# Patient Record
Sex: FEMALE | Race: WHITE | Employment: OTHER | URBAN - METROPOLITAN AREA
[De-identification: names, ages, dates, MRNs, and addresses within clinical notes are randomized per-mention and may not be internally consistent; named-entity substitution may affect disease eponyms.]

---

## 2017-02-02 ENCOUNTER — HOSPITAL ENCOUNTER (EMERGENCY)
Age: 57
Discharge: HOME | End: 2017-02-02
Attending: EMERGENCY MEDICINE
Payer: COMMERCIAL

## 2017-02-02 ENCOUNTER — APPOINTMENT (OUTPATIENT)
Dept: GENERAL RADIOLOGY | Age: 57
End: 2017-02-02
Attending: PHYSICIAN ASSISTANT
Payer: COMMERCIAL

## 2017-02-02 VITALS
HEIGHT: 65 IN | HEART RATE: 91 BPM | DIASTOLIC BLOOD PRESSURE: 54 MMHG | BODY MASS INDEX: 29.99 KG/M2 | TEMPERATURE: 97.9 F | OXYGEN SATURATION: 99 % | SYSTOLIC BLOOD PRESSURE: 114 MMHG | WEIGHT: 180 LBS | RESPIRATION RATE: 18 BRPM

## 2017-02-02 DIAGNOSIS — M51.36 DDD (DEGENERATIVE DISC DISEASE), LUMBAR: Primary | ICD-10-CM

## 2017-02-02 PROCEDURE — 99283 EMERGENCY DEPT VISIT LOW MDM: CPT

## 2017-02-02 PROCEDURE — 72100 X-RAY EXAM L-S SPINE 2/3 VWS: CPT

## 2017-02-02 PROCEDURE — 51798 US URINE CAPACITY MEASURE: CPT

## 2017-02-02 PROCEDURE — 74011250637 HC RX REV CODE- 250/637: Performed by: PHYSICIAN ASSISTANT

## 2017-02-02 RX ORDER — HYDROCODONE BITARTRATE AND ACETAMINOPHEN 5; 325 MG/1; MG/1
1 TABLET ORAL
Qty: 20 TAB | Refills: 0 | Status: SHIPPED | OUTPATIENT
Start: 2017-02-02 | End: 2017-02-05

## 2017-02-02 RX ORDER — METHOCARBAMOL 500 MG/1
500 TABLET, FILM COATED ORAL 4 TIMES DAILY
Qty: 20 TAB | Refills: 0 | Status: SHIPPED | OUTPATIENT
Start: 2017-02-02 | End: 2017-02-05

## 2017-02-02 RX ORDER — OXYCODONE AND ACETAMINOPHEN 5; 325 MG/1; MG/1
1 TABLET ORAL
Status: COMPLETED | OUTPATIENT
Start: 2017-02-02 | End: 2017-02-02

## 2017-02-02 RX ORDER — PREDNISONE 10 MG/1
TABLET ORAL
Qty: 21 TAB | Refills: 0 | Status: SHIPPED | OUTPATIENT
Start: 2017-02-02 | End: 2018-10-09

## 2017-02-02 RX ADMIN — OXYCODONE HYDROCHLORIDE AND ACETAMINOPHEN 1 TABLET: 5; 325 TABLET ORAL at 13:23

## 2017-02-02 NOTE — ED PROVIDER NOTES
HPI Comments: This  59 yo  female with medical history remarkable for asthma, mixed connective tissue disease, basal cell cancer, chronic back pain, ETOH abuse, GERD, hypertension, hypertriglyceridemia, and lupus presenting ambulatory to the ED with wife with CC of bilateral lower extremity pain rt>ltand lower back pain. Hx of chronic back pain followed by orthopedist.  Worse now with pain limiting mobility. Several falls in past two weeks. A few episodes of incontinence of urine and stool wearing a undergarment in past week. Last episode two days ago. Has appt with ortho but pain is more intense and not responding to sulindac and flexeril. Has had diarrhea. No fever, chills, headache, chest pain, SOB, abdominal pain, dysuria, urgency or hematuria. Numbness to lateral thigh. Patient is a 60 y.o. female presenting with leg pain. The history is provided by the patient. Leg Pain    Associated symptoms include back pain. Pertinent negatives include no numbness and no neck pain.         Past Medical History:   Diagnosis Date    Asthma      activity induced asthma     Autoimmune disease ([address]      mixed connective tissue disease    Cancer ([address]      skin-basal    Chronic pain      lower back, joints    DDD (degenerative disc disease), lumbar     DJD (degenerative joint disease)     ETOH abuse      Sober 12-13-11    GERD (gastroesophageal reflux disease)     Hernia     Hx of bronchitis     Hx of mammogram 2/18/15     Found a 1.2cm mass = cyst, benign    Hx of seizure disorder 2010     unknown cause, none since    Hx of sinusitis     Hyperlipemia     Hypertension     Hypertriglyceridemia     Hyponatremia     Insomnia     Lupus ([address]     Murmur, cardiac     Neuropathic pain     Other bursitis of hip, right hip 09/14/2016     Had a deep hip injection for pain     Routine Papanicolaou smear 09/20/2016     Negative (no hpv)     Stroke ([address] 3/11     PCP states she had a TIA - patient denies this (was low Na)    Tobacco abuse      Stopped in 2012       Past Surgical History:   Procedure Laterality Date    Hx tonsil and adenoidectomy  1962    Hx lumbar laminectomy       rods in 1996, out 1998    Hx lumbar fusion       10/8/2012    Hx lap cholecystectomy  3/11/2014    Hx hernia repair  10/7/2014     incisional with mesh    Hx other surgical  10/08/2012     [facility] Bone Growth Stimulator    Hx total abdominal hysterectomy  12/20/1992     fibroids; Dr. Shawn Chang Hx cyst incision and drainage Left 08/2016    Hx orthopaedic  1996, 1998     back surgery    Hx orthopaedic Bilateral 2013 R, 2014 L     carpal tunnel    Hx orthopaedic  6/27/2013     right knee arthroscopy    Hx orthopaedic Right 3/2015     trigger thumb release    Hx orthopaedic  10/8/2012     ALIF/ PLIF with bone stimulator    Hx orthopaedic  10/23/2012     Back surgery to adjust hardware         Family History:   Problem Relation Age of Onset    Hypertension Mother     Stroke Mother      TIA    Arthritis-osteo Father      RA    No Known Problems Sister     No Known Problems Brother     No Known Problems Sister     No Known Problems Sister        Social History     Social History    Marital status:      Spouse name: N/A    Number of children: N/A    Years of education: N/A     Occupational History    Not on file. Social History Main Topics    Smoking status: Former Smoker     Packs/day: 2.00     Years: 32.00     Quit date: 7/1/2012    Smokeless tobacco: Never Used      Comment: Never used vapor or e-cigs     Alcohol use No      Comment: Sober 4 years    Drug use: No    Sexual activity: Not Currently     Birth control/ protection: Surgical     Other Topics Concern    Not on file     Social History Narrative         ALLERGIES: Penicillins    Review of Systems   Constitutional: Negative. Negative for chills, fatigue and fever. HENT: Negative.   Negative for congestion, ear pain, facial swelling, rhinorrhea, sneezing and sore throat. Eyes: Negative for pain, discharge and itching. Respiratory: Negative for cough, chest tightness and shortness of breath. Cardiovascular: Negative. Negative for chest pain and leg swelling. Gastrointestinal: Negative. Negative for abdominal distention, abdominal pain, constipation, diarrhea, nausea and vomiting. Genitourinary: Negative for difficulty urinating, frequency and urgency. Musculoskeletal: Positive for back pain. Negative for arthralgias, joint swelling, neck pain and neck stiffness. Skin: Negative for color change and rash. Neurological: Negative for dizziness, tremors, weakness, numbness and headaches. No saddle paresthesias   Psychiatric/Behavioral: Negative for behavioral problems and confusion. All other systems reviewed and are negative. Vitals:    02/02/17 1222 02/02/17 1247   BP: 145/68    Pulse: 91    Resp: 18    Temp: 97.9 °F (36.6 °C)    SpO2: 99% 99%   Weight: 81.6 kg (180 lb)    Height: 5' 5\" (1.651 m)             Physical Exam   Constitutional: She appears well-developed and well-nourished.  adult female seated in NAD   HENT:   Head: Normocephalic and atraumatic. Right Ear: External ear normal.   Left Ear: External ear normal.   Nose: Nose normal.   Mouth/Throat: Oropharynx is clear and moist.   Eyes: Conjunctivae and EOM are normal. Pupils are equal, round, and reactive to light. Neck: Normal range of motion. Neck supple. Cardiovascular: Normal rate, regular rhythm and normal heart sounds. Pulmonary/Chest: Effort normal and breath sounds normal.   Abdominal: Soft. Bowel sounds are normal.   Musculoskeletal: Normal range of motion. Lumbar spine: midline surgical scar, diffuse lower back tenderness, no mass, warmth, or erythema appreciated. No bony tenderness, 5/5 LE strength, distal sensation intact, cap refill appropriate, plantar reflex normal   Neurological: She is alert.    Skin: Skin is warm and dry. Psychiatric: She has a normal mood and affect. Her behavior is normal.   Nursing note and vitals reviewed. MDM  Number of Diagnoses or Management Options  DDD (degenerative disc disease), lumbar:   Diagnosis management comments: 59 yo  female with worsening lower back pain radiating into the legs with increased falls. Reported incontinent hx but inconsistent in occurrence or with physical exam findings. WILL Noriega\    Plan  Xray and analgesia. Wali Noriega         Amount and/or Complexity of Data Reviewed  Tests in the radiology section of CPT®: ordered and reviewed  Independent visualization of images, tracings, or specimens: yes      ED Course       Procedures      Progress note    Imaging reviewed. Wali Noriega    Patient's results have been reviewed with them. Patient and/or family have verbally conveyed their understanding and agreement of the patient's signs, symptoms, diagnosis, treatment and prognosis and additionally agree to follow up as recommended or return to the Emergency Room should their condition change prior to follow-up. Discharge instructions have also been provided to the patient with some educational information regarding their diagnosis as well a list of reasons why they would want to return to the ER prior to their follow-up appointment should their condition change. Wali Noriega    Discussed case with attending Physician Eri Oakley. Agrees with care and will D/C with follow up. Wali Noriega    A/P  DDD: Apply ice. Norco 1-2 tabs every 6 hrs as needed for pain. Stop flexeril and start Robaxin three times daily. Follow-up with ortho. Return for any new or worsening.  Wali Mcmillan

## 2017-02-02 NOTE — ED NOTES
Bladder scan result 197 ml; Reports no urge to urinate at this time however patient placed on bedpan

## 2017-02-02 NOTE — DISCHARGE INSTRUCTIONS
We hope that we have addressed all of your medical concerns. The examination and treatment you received in the Emergency Department were for an emergent problem and were not intended as complete care. It is important that you follow up with your healthcare provider(s) for ongoing care. If your symptoms worsen or do not improve as expected, and you are unable to reach your usual health care provider(s), you should return to the Emergency Department. Today's healthcare is undergoing tremendous change, and patient satisfaction surveys are one of the many tools to assess the quality of medical care. You may receive a survey from the [employer] regarding your experience in the Emergency Department. I hope that your experience has been completely positive, particularly the medical care that I provided. As such, please participate in the survey; anything less than excellent does not meet my expectations or intentions. Formerly [facility] and [employer] participate in nationally recognized quality of care measures. If your blood pressure is greater than 120/80, as reported below, we urge that you seek medical care to address the potential of high blood pressure, commonly known as hypertension. Hypertension can be hereditary or can be caused by certain medical conditions, pain, stress, or \"white coat syndrome. \"       Please make an appointment with your health care provider(s) for follow up of your Emergency Department visit. VITALS:   Patient Vitals for the past 8 hrs:   Temp Pulse Resp BP SpO2   02/02/17 1247 - - - - 99 %   02/02/17 1222 97.9 °F (36.6 °C) 91 18 145/68 99 %          Thank you for allowing us to provide you with medical care today. We realize that you have many choices for your emergency care needs. Please choose us in the future for any continued health care needs. Regards,           Michelle Galaviz, [address] [facility] [place]: [phone]            No results found for this or any previous visit (from the past 24 hour(s)). Xr Spine Lumb 2 Or 3 V    Result Date: 2/2/2017  INDICATION: back pain/leg pain EXAM: Lumbar spine radiographs, 3 views. COMPARISON: 10/18/2012 . FINDINGS: A three-view examination of the lumbar spine reveals normal bony alignment. Bone mineral content is normal for age . There is no obvious acute fracture or dislocation. Vertebral body heights are preserved. Disc space height is diminished at L2-3, new since the prior studies, with endplate sclerosis and spondylosis. . Disc space heights show intervertebral disc devices at L3-4, L4-5 and L5-S1 without change. There are also posterior rods with pedicle screws at the levels L3, L4, L5 and S1. Hardware appears intact and stable in position. Lateral fusion mass is noted bilaterally from L3 through S1. A spinal stimulator is incidentally noted in the upper lumbar spine. .  Pedicles and sacroiliac joints are intact, as are visualized sacral foramina. Aortic calcification is heavy without aneurysm formation. IMPRESSION: 1. No acute bony abnormality of the lumbar spine. 2. Degenerative disc disease significant at L2-3, new since the prior study. 3. Stable postoperative changes. Back Pain: Care Instructions  Your Care Instructions    Back pain has many possible causes. It is often related to problems with muscles and ligaments of the back. It may also be related to problems with the nerves, discs, or bones of the back. Moving, lifting, standing, sitting, or sleeping in an awkward way can strain the back. Sometimes you don't notice the injury until later. Arthritis is another common cause of back pain. Although it may hurt a lot, back pain usually improves on its own within several weeks. Most people recover in 12 weeks or less.  Using good home treatment and being careful not to stress your back can help you feel better sooner. Follow-up care is a key part of your treatment and safety. Be sure to make and go to all appointments, and call your doctor if you are having problems. Its also a good idea to know your test results and keep a list of the medicines you take. How can you care for yourself at home? · Sit or lie in positions that are most comfortable and reduce your pain. Try one of these positions when you lie down:  ¨ Lie on your back with your knees bent and supported by large pillows. ¨ Lie on the floor with your legs on the seat of a sofa or chair. Tim العراقي on your side with your knees and hips bent and a pillow between your legs. ¨ Lie on your stomach if it does not make pain worse. · Do not sit up in bed, and avoid soft couches and twisted positions. Bed rest can help relieve pain at first, but it delays healing. Avoid bed rest after the first day of back pain. · Change positions every 30 minutes. If you must sit for long periods of time, take breaks from sitting. Get up and walk around, or lie in a comfortable position. · Try using a heating pad on a low or medium setting for 15 to 20 minutes every 2 or 3 hours. Try a warm shower in place of one session with the heating pad. · You can also try an ice pack for 10 to 15 minutes every 2 to 3 hours. Put a thin cloth between the ice pack and your skin. · Take pain medicines exactly as directed. ¨ If the doctor gave you a prescription medicine for pain, take it as prescribed. ¨ If you are not taking a prescription pain medicine, ask your doctor if you can take an over-the-counter medicine. · Take short walks several times a day. You can start with 5 to 10 minutes, 3 or 4 times a day, and work up to longer walks. Walk on level surfaces and avoid hills and stairs until your back is better. · Return to work and other activities as soon as you can. Continued rest without activity is usually not good for your back.   · To prevent future back pain, do exercises to stretch and strengthen your back and stomach. Learn how to use good posture, safe lifting techniques, and proper body mechanics. When should you call for help? Call your doctor now or seek immediate medical care if:  · You have new or worsening numbness in your legs. · You have new or worsening weakness in your legs. (This could make it hard to stand up.)  · You lose control of your bladder or bowels. Watch closely for changes in your health, and be sure to contact your doctor if:  · Your pain gets worse. · You are not getting better after 2 weeks. Where can you learn more? Go to http://flory-tyra.info/. Enter K489 in the search box to learn more about \"Back Pain: Care Instructions. \"  Current as of: May 23, 2016  Content Version: 11.1  © 9636-2518 [employer]. Care instructions adapted under license by "[employer]" (which disclaims liability or warranty for this information). If you have questions about a medical condition or this instruction, always ask your healthcare professional. [address] any warranty or liability for your use of this information.

## 2017-02-02 NOTE — ED TRIAGE NOTES
Leg pain, right more so than left, worsening over the last month. Has also had an increase in falls with the last being January 19th. Currently using a cane.

## 2017-02-02 NOTE — Clinical Note
pply ice. Norco 1-2 tabs every 6 hrs as needed for pain. Stop flexeril and start Robaxin three times daily. Follow-up with ortho. Return for any new or worsening.  Michelle NINA [facility]

## 2017-02-05 ENCOUNTER — HOSPITAL ENCOUNTER (EMERGENCY)
Age: 57
Discharge: HOME | End: 2017-02-05
Attending: EMERGENCY MEDICINE
Payer: COMMERCIAL

## 2017-02-05 VITALS
SYSTOLIC BLOOD PRESSURE: 146 MMHG | HEART RATE: 65 BPM | RESPIRATION RATE: 20 BRPM | DIASTOLIC BLOOD PRESSURE: 65 MMHG | HEIGHT: 65 IN | TEMPERATURE: 98.4 F | OXYGEN SATURATION: 97 % | BODY MASS INDEX: 29.99 KG/M2 | WEIGHT: 180 LBS

## 2017-02-05 DIAGNOSIS — M54.42 CHRONIC MIDLINE LOW BACK PAIN WITH BILATERAL SCIATICA: Primary | ICD-10-CM

## 2017-02-05 DIAGNOSIS — G89.29 CHRONIC MIDLINE LOW BACK PAIN WITH BILATERAL SCIATICA: Primary | ICD-10-CM

## 2017-02-05 DIAGNOSIS — M54.41 CHRONIC MIDLINE LOW BACK PAIN WITH BILATERAL SCIATICA: Primary | ICD-10-CM

## 2017-02-05 PROCEDURE — 74011250637 HC RX REV CODE- 250/637: Performed by: PHYSICIAN ASSISTANT

## 2017-02-05 PROCEDURE — 96372 THER/PROPH/DIAG INJ SC/IM: CPT

## 2017-02-05 PROCEDURE — 74011250636 HC RX REV CODE- 250/636: Performed by: PHYSICIAN ASSISTANT

## 2017-02-05 PROCEDURE — 74011250637 HC RX REV CODE- 250/637: Performed by: EMERGENCY MEDICINE

## 2017-02-05 PROCEDURE — 99285 EMERGENCY DEPT VISIT HI MDM: CPT

## 2017-02-05 RX ORDER — DIAZEPAM 5 MG/1
5 TABLET ORAL
Status: COMPLETED | OUTPATIENT
Start: 2017-02-05 | End: 2017-02-05

## 2017-02-05 RX ORDER — LIDOCAINE 50 MG/G
1 PATCH TOPICAL EVERY 24 HOURS
Status: DISCONTINUED | OUTPATIENT
Start: 2017-02-05 | End: 2017-02-05 | Stop reason: HOSPADM

## 2017-02-05 RX ORDER — HYDROMORPHONE HYDROCHLORIDE 2 MG/1
2 TABLET ORAL
Status: COMPLETED | OUTPATIENT
Start: 2017-02-05 | End: 2017-02-05

## 2017-02-05 RX ORDER — LIDOCAINE 50 MG/G
PATCH TOPICAL
Qty: 15 EACH | Refills: 0 | Status: SHIPPED | OUTPATIENT
Start: 2017-02-05 | End: 2018-10-09

## 2017-02-05 RX ORDER — OXYCODONE AND ACETAMINOPHEN 5; 325 MG/1; MG/1
1 TABLET ORAL
Status: COMPLETED | OUTPATIENT
Start: 2017-02-05 | End: 2017-02-05

## 2017-02-05 RX ORDER — DIAZEPAM 5 MG/1
5 TABLET ORAL
Qty: 12 TAB | Refills: 0 | Status: ON HOLD | OUTPATIENT
Start: 2017-02-05 | End: 2017-02-09

## 2017-02-05 RX ORDER — KETOROLAC TROMETHAMINE 30 MG/ML
60 INJECTION, SOLUTION INTRAMUSCULAR; INTRAVENOUS
Status: COMPLETED | OUTPATIENT
Start: 2017-02-05 | End: 2017-02-05

## 2017-02-05 RX ORDER — OXYCODONE AND ACETAMINOPHEN 5; 325 MG/1; MG/1
1 TABLET ORAL
Qty: 12 TAB | Refills: 0 | Status: ON HOLD | OUTPATIENT
Start: 2017-02-05 | End: 2017-02-09

## 2017-02-05 RX ADMIN — OXYCODONE HYDROCHLORIDE AND ACETAMINOPHEN 1 TABLET: 5; 325 TABLET ORAL at 11:22

## 2017-02-05 RX ADMIN — DIAZEPAM 5 MG: 5 TABLET ORAL at 08:59

## 2017-02-05 RX ADMIN — HYDROMORPHONE HYDROCHLORIDE 2 MG: 2 TABLET ORAL at 12:29

## 2017-02-05 RX ADMIN — OXYCODONE HYDROCHLORIDE AND ACETAMINOPHEN 1 TABLET: 5; 325 TABLET ORAL at 08:59

## 2017-02-05 RX ADMIN — DIAZEPAM 5 MG: 5 TABLET ORAL at 11:22

## 2017-02-05 RX ADMIN — KETOROLAC TROMETHAMINE 60 MG: 30 INJECTION, SOLUTION INTRAMUSCULAR at 11:22

## 2017-02-05 NOTE — ED NOTES
[facility] called for ambulance transport home per patient request. Dr. Lopez Torrez has gone back in to speak with patient. A referral to case management initiated for follow up. Going with with RX Percocet and Valium and already has ortho appointment set up with Dr. Eliz Graham for follow up Friday.

## 2017-02-05 NOTE — ED NOTES
Discharge instructions given by provider. [facility] has arrived and updated on ER visit including administered medications. Patient / friend deny any questions regarding discharge instructions.

## 2017-02-05 NOTE — ED PROVIDER NOTES
HPI Comments: Letha Arevalo is a 60 y.o. female with hx significant for chronic back pain who presents ambulatory to ER with c/o low back pain x early January. Pt notes progressively worsening midline lower back pain x early January. States pain has become so severe \"I can't even get out of bed in the mornings\". States \"it takes me hours to get up bc of the pain\". States unable to lie on her back or even sit up bc pain hurts so much. Notes pain radiates down bilateral posterior legs. Notes she has had difficulty getting up and going to the bathroom bc pain is so severe thus has urinated on herself multiple times. Denies any loss of actual control of her bowel/bladder, notes she is simply unable to get out of bed. States she was seen in ER three days ago for the same sx and had xray done at that time. Told DDD in lumbar spine and rx robaxin, norco, and prednisone taper. States taking all at home without relief in sx and states pain is in fact worsening. Notes she has appt with Dr. Sharyle Cal, ortho spine, this coming Friday but couldn't take the pain anymore. Did not call Dr. Kiki Morales office after ER visit 3 days ago. No injury/trauma. No weakness, numbness, tingling, and any other complaints. She specifically denies any fevers, chills, nausea, vomiting, chest pain, shortness of breath, headache, rash, diarrhea, abdominal pain, urinary/bowel changes, sweating or weight loss. PCP: Huong Ren.  Emmie Reyes MD   PMHx significant for: Past Medical History:    Asthma                                                          Comment:activity induced asthma     Autoimmune disease ([address]                                        Comment:mixed connective tissue disease    Cancer ([facility] [address] [address]                                                    Comment:skin-basal    Chronic pain                                                    Comment:lower back, joints    DDD (degenerative disc disease), lumbar                       DJD (degenerative joint disease) ETOH abuse                                                      Comment:Sober 12-13-11    GERD (gastroesophageal reflux disease)                        Hernia                                                        Hx of bronchitis                                              Hx of mammogram                                 2/18/15         Comment:Found a 1.2cm mass = cyst, benign    Hx of seizure disorder                          2010            Comment:unknown cause, none since    Hx of sinusitis                                               Hyperlipemia                                                  Hypertension                                                  Hypertriglyceridemia                                          Hyponatremia                                                  Insomnia                                                      Lupus ([facility]                                                   Murmur, cardiac                                               Neuropathic pain                                              Other bursitis of hip, right hip                09/14/2016      Comment:Had a deep hip injection for pain     Routine Papanicolaou smear                      09/20/2016      Comment:Negative (no hpv)     Stroke ([facility] [address]                                    3/11            Comment:PCP states she had a TIA - patient denies this                (was low Na)    Tobacco abuse                                                   Comment:Stopped in 2012    PSHx significant for: Past Surgical History:    HX TONSIL AND ADENOIDECTOMY                      1962          HX LUMBAR LAMINECTOMY                                            Comment:rods in 1996, out 1998    [facility]    HX LAP CHOLECYSTECTOMY                           3/11/2014     HX HERNIA REPAIR                                 10/7/2014 Comment:incisional with mesh    HX OTHER SURGICAL                                10/08/2012      Comment:[facility] Bone Growth Stimulator    HX TOTAL ABDOMINAL HYSTERECTOMY                  12/20/1992      Comment:fibroids; Dr. Maria D Quiles                   Left 08/2016       HX [address] [facility], 1998      Comment:back surgery    HX ORTHOPAEDIC                                  Bilateral 2013 R, 2*      Comment:carpal tunnel    HX ORTHOPAEDIC                                   6/27/2013       Comment:right knee arthroscopy    HX ORTHOPAEDIC                                  Right 3/2015          Comment:trigger thumb release    HX ORTHOPAEDIC                                   10/8/2012       Comment:ALIF/ PLIF with bone stimulator    HX ORTHOPAEDIC                                   10/23/2012      Comment:Back surgery to adjust hardware      -- Penicillins -- Rash    --  Fever. 9/14/16 Reports able to take Keflex             without problem. There are no other complaints, changes or physical findings at this time. The history is provided by the patient.         Past Medical History:   Diagnosis Date    Asthma      activity induced asthma     Autoimmune disease ([facility] [address]      mixed connective tissue disease    Cancer ([facility] [address]      skin-basal    Chronic pain      lower back, joints    DDD (degenerative disc disease), lumbar     DJD (degenerative joint disease)     ETOH abuse      Sober 12-13-11    GERD (gastroesophageal reflux disease)     Hernia     Hx of bronchitis     Hx of mammogram 2/18/15     Found a 1.2cm mass = cyst, benign    Hx of seizure disorder 2010     unknown cause, none since    Hx of sinusitis     Hyperlipemia     Hypertension     Hypertriglyceridemia     Hyponatremia     Insomnia     Lupus (HCC)     Murmur, cardiac     Neuropathic pain     Other bursitis of hip, right hip 09/14/2016     Had a deep hip injection for pain  Routine Papanicolaou smear 09/20/2016     Negative (no hpv)     Stroke ([facility] [address] 3/11     PCP states she had a TIA - patient denies this (was low Na)    Tobacco abuse      Stopped in 2012       Past Surgical History:   Procedure Laterality Date    Hx tonsil and adenoidectomy  1962    Hx lumbar laminectomy       rods in 1996, out 1998    Hx lumbar fusion       10/8/2012    Hx lap cholecystectomy  3/11/2014    Hx hernia repair  10/7/2014     incisional with mesh    Hx other surgical  10/08/2012     [facility] Bone Growth Stimulator    Hx total abdominal hysterectomy  12/20/1992     fibroids; Dr. Carlos Rodriguez Hx cyst incision and drainage Left 08/2016    Hx orthopaedic  1996, 1998     back surgery    Hx orthopaedic Bilateral 2013 R, 2014 L     carpal tunnel    Hx orthopaedic  6/27/2013     right knee arthroscopy    Hx orthopaedic Right 3/2015     trigger thumb release    Hx orthopaedic  10/8/2012     ALIF/ PLIF with bone stimulator    Hx orthopaedic  10/23/2012     Back surgery to adjust hardware         Family History:   Problem Relation Age of Onset    Hypertension Mother     Stroke Mother      TIA    Arthritis-osteo Father      RA    No Known Problems Sister     No Known Problems Brother     No Known Problems Sister     No Known Problems Sister        Social History     Social History    Marital status:      Spouse name: N/A    Number of children: N/A    Years of education: N/A     Occupational History    Not on file.      Social History Main Topics    Smoking status: Former Smoker     Packs/day: 2.00     Years: 32.00     Quit date: 7/1/2012    Smokeless tobacco: Never Used      Comment: Never used vapor or e-cigs     Alcohol use No      Comment: Sober 4 years    Drug use: No    Sexual activity: Not Currently     Birth control/ protection: Surgical     Other Topics Concern    Not on file     Social History Narrative         ALLERGIES: Penicillins    Review of Systems   Constitutional: Negative. Negative for appetite change, chills, fatigue and fever. HENT: Negative. Negative for congestion and sore throat. Eyes: Negative. Negative for visual disturbance. Respiratory: Negative. Negative for cough and shortness of breath. Cardiovascular: Negative. Negative for chest pain, palpitations and leg swelling. Gastrointestinal: Negative. Negative for abdominal pain, constipation, diarrhea, nausea and vomiting. Genitourinary: Negative. Negative for dysuria, flank pain and hematuria. Musculoskeletal: Positive for back pain. Negative for neck pain. Skin: Negative. Negative for rash. Neurological: Negative. Negative for dizziness, syncope, weakness, numbness and headaches. Hematological: Negative. Psychiatric/Behavioral: Negative. All other systems reviewed and are negative. Vitals:    02/05/17 0833   BP: 154/69   Pulse: 69   Resp: 20   Temp: 98.4 °F (36.9 °C)   SpO2: 100%   Weight: 81.6 kg (180 lb)   Height: 5' 5\" (1.651 m)            Physical Exam   Constitutional: She is oriented to person, place, and time. She appears well-developed and well-nourished. No distress. HENT:   Head: Normocephalic and atraumatic. Mouth/Throat: Oropharynx is clear and moist.   Neck: Normal range of motion. Cardiovascular: Normal rate, S1 normal, S2 normal, normal heart sounds, intact distal pulses and normal pulses. Exam reveals no gallop and no friction rub. No murmur heard. Pulses:       Dorsalis pedis pulses are 2+ on the right side, and 2+ on the left side. Pulmonary/Chest: Effort normal and breath sounds normal. No accessory muscle usage. No respiratory distress. She has no decreased breath sounds. She has no wheezes. She has no rhonchi. She has no rales. Abdominal: Soft. Normal appearance and bowel sounds are normal. She exhibits no distension. There is no hepatosplenomegaly. There is no tenderness. There is no rebound, no guarding and no CVA tenderness. Musculoskeletal: Normal range of motion. She exhibits no edema or tenderness. Mild midline lumbar spinal tenderness, no crepitus or other abnormality. No midline cervical, thoracic spinal tenderness to palpation. FROM spine and all joints/extremities in ER without difficulty. Ambulatory in ER without difficulty. Neurological: She is alert and oriented to person, place, and time. She has normal strength. No sensory deficit. No focal neurologic deficit. Strength 5/5 and equal bilateral upper and lower extremities. NVI all extremities, brisk cap refill all extremities. DP pulse 2+ bilaterally. Radial pulse 2+ bilaterally. Skin: Skin is warm and dry. No rash noted. She is not diaphoretic. No erythema. No pallor. Psychiatric: She has a normal mood and affect. Her behavior is normal.   Nursing note and vitals reviewed. MDM  Number of Diagnoses or Management Options  Diagnosis management comments: DDx: chronic back pain, UTI, drug seeking behavior, DDD       Amount and/or Complexity of Data Reviewed  Clinical lab tests: ordered and reviewed  Decide to obtain previous medical records or to obtain history from someone other than the patient: yes  Review and summarize past medical records: yes  Discuss the patient with other providers: yes (Dr. Laura Long)    Patient Progress  Patient progress: stable    ED Course       Procedures           10:15 AM   Christine Parikh PA-C discussed patient with Miguel Angel Gallegos MD who is in agreement with care plan as outlined. Will be in to see the patient. No further recommendations. Christine Parikh PA-C        10:16 AM  Christine Parikh PA-C personally reviewed patient's prescription history on the [facility]  registry. Most recent rx, filled within the past month, are as follows: hydrocodone qty20 2/2/17. Christine Parikh PA-C        10:40AM  Miguel Angel Gallegos MD in to see patient. Recommends 60mg toradol and discharge home with ortho spine follow up.  Christine Parikh [place]     10:57 AM  Pt has been reevaluated. There are no new complaints, changes, or physical findings at this time. Medications have been reviewed w/ pt and/or family. Pt and/or family's questions have been answered. Pt and/or family expressed good understanding of the dx/tx/rx and is in agreement with plan of care. Pt instructed and agreed to f/u w/ PCP, ortho spine and to return to ED upon further deterioration. Pt is ready for discharge. LABORATORY TESTS:  No results found for this or any previous visit (from the past 12 hour(s)). IMAGING RESULTS:  No orders to display     No results found. MEDICATIONS GIVEN:  Medications   lidocaine (LIDODERM) 5 % patch 1 Patch (1 Patch TransDERmal Apply Patch 2/5/17 0933)   ketorolac (TORADOL) injection 60 mg (not administered)   oxyCODONE-acetaminophen (PERCOCET) 5-325 mg per tablet 1 Tab (1 Tab Oral Given 2/5/17 0859)   diazePAM (VALIUM) tablet 5 mg (5 mg Oral Given 2/5/17 0859)       IMPRESSION:  1. Chronic midline low back pain with bilateral sciatica        PLAN:  1. Current Discharge Medication List      START taking these medications    Details   diazePAM (VALIUM) 5 mg tablet Take 1 Tab by mouth three (3) times daily as needed (spasm). Max Daily Amount: 15 mg.  Qty: 12 Tab, Refills: 0      oxyCODONE-acetaminophen (PERCOCET) 5-325 mg per tablet Take 1 Tab by mouth every six (6) hours as needed for Pain. Max Daily Amount: 4 Tabs. Qty: 12 Tab, Refills: 0      lidocaine (LIDODERM) 5 % Apply patch to the affected area for 12 hours a day as needed for pain and remove for 12 hours a day. Qty: 15 Each, Refills: 0         CONTINUE these medications which have NOT CHANGED    Details   predniSONE (STERAPRED DS) 10 mg dose pack Take according to dose pack instructions. Qty: 21 Tab, Refills: 0      Estradiol (DIVIGEL) 0.5 mg (0.1 %) glpk 1 Packet by TransDERmal route daily.  Apply 1 packet to thigh daily  Qty: 90 Packet, Refills: 3      amLODIPine (NORVASC) 5 mg tablet Take 5 mg by mouth two (2) times a day. Associated Diagnoses: Breast pain, left      esomeprazole (NEXIUM) 40 mg capsule Take 40 mg by mouth two (2) times a day. Associated Diagnoses: Breast pain, left      ADVAIR DISKUS 250-50 mcg/dose diskus inhaler     Associated Diagnoses: Breast pain, left      folic acid (FOLVITE) 1 mg tablet Take 2 mg by mouth daily. Associated Diagnoses: Breast pain, left      gemfibrozil (LOPID) 600 mg tablet Take 600 mg by mouth three (3) times daily. Associated Diagnoses: Breast pain, left      hydroxychloroquine (PLAQUENIL) 200 mg tablet Take 200 mg by mouth two (2) times a day. Associated Diagnoses: Breast pain, left      SAVELLA 50 mg tablet 50 mg two (2) times a day. Associated Diagnoses: Breast pain, left      BYSTOLIC 10 mg tablet Take 10 mg by mouth daily. Associated Diagnoses: Breast pain, left      sucralfate (CARAFATE) 1 gram tablet     Associated Diagnoses: Breast pain, left      methotrexate (RHEUMATREX) 2.5 mg tablet Take 6 Tabs by mouth Every Friday. MAY RESUME 2 WEEKS AFTER SURGERY  Qty: 1 Tab, Refills: 0      clindamycin (CLEOCIN) 300 mg capsule Take 600 mg by mouth daily as needed (takes 1 hour prior to dental surgery). OTHER       lisinopril (PRINIVIL, ZESTRIL) 10 mg tablet Take 20 mg by mouth daily. pyridoxine (VITAMIN B-6) 100 mg tablet Take 100 mg by mouth daily. cyanocobalamin (VITAMIN B-12) 1,000 mcg tablet Take 1,000 mcg by mouth daily. albuterol (PROVENTIL HFA) 90 mcg/actuation inhaler Take 2 Puffs by inhalation every six (6) hours as needed. CALCIUM PO Take 600 mg by mouth three (3) times daily. CYCLOSPORINE (RESTASIS OP) Apply 1 drop to eye two (2) times a day. One drop both eyes 2 x daily      multivitamin (ONE A DAY) tablet Take 1 Tab by mouth daily.          STOP taking these medications       HYDROcodone-acetaminophen (NORCO) 5-325 mg per tablet Comments:   Reason for Stopping: methocarbamol (ROBAXIN) 500 mg tablet Comments:   Reason for Stoppin.   Follow-up Information     Follow up With Details Comments [address], [place] Schedule an appointment as soon as possible for a visit in 3 days  [address]      Elmira Carty MD Go to as scheduled this week [address] [place] [address]      [facility] EMERGENCY DEPT  If symptoms worsen [address] [facility]  [phone]            Return to ED if worse         12:10PM  Pt refusing to leave ER until \"I am pain free\". Notified Leeanna Roque MD who is in to see patient. Will give one dose or oral dilaudid and discharge home.  Kvng Mendoza PA-C

## 2017-02-05 NOTE — ED NOTES
Bladder scan reveals 875 ml of urine in bladder. Has been wearing incontinence pull ups because she has not wanted to ambulate to bathroom due to pain. Offered BSC and refused to attempt. Explained incontinence wick and agreed to try. MT at bedside and wick positioned as per recommendations and connected to suction. Pillow provided for under head and between knees, now side lying to self position of comfort.

## 2017-02-05 NOTE — DISCHARGE INSTRUCTIONS
We hope that we have addressed all of your medical concerns. The examination and treatment you received in the Emergency Department were for an emergent problem and were not intended as complete care. It is important that you follow up with your healthcare provider(s) for ongoing care. If your symptoms worsen or do not improve as expected, and you are unable to reach your usual health care provider(s), you should return to the Emergency Department. Today's healthcare is undergoing tremendous change, and patient satisfaction surveys are one of the many tools to assess the quality of medical care. You may receive a survey from the [employer] regarding your experience in the Emergency Department. I hope that your experience has been completely positive, particularly the medical care that I provided. As such, please participate in the survey; anything less than excellent does not meet my expectations or intentions. [facility] and [address] [place] [address] participate in nationally recognized quality of care measures. If your blood pressure is greater than 120/80, as reported below, we urge that you seek medical care to address the potential of high blood pressure, commonly known as hypertension. Hypertension can be hereditary or can be caused by certain medical conditions, pain, stress, or \"white coat syndrome. \"       Please make an appointment with your health care provider(s) for follow up of your Emergency Department visit. VITALS:   Patient Vitals for the past 8 hrs:   Temp Pulse Resp BP SpO2   02/05/17 0833 98.4 °F (36.9 °C) 69 20 154/69 100 %          Thank you for allowing us to provide you with medical care today. We realize that you have many choices for your emergency care needs. Please choose us in the future for any continued health care needs. Eliz Bush, [address] [place] [address] [address] Office: [phone]            No results found for this or any previous visit (from the past 24 hour(s)). No results found. Back Pain: Care Instructions  Your Care Instructions    Back pain has many possible causes. It is often related to problems with muscles and ligaments of the back. It may also be related to problems with the nerves, discs, or bones of the back. Moving, lifting, standing, sitting, or sleeping in an awkward way can strain the back. Sometimes you don't notice the injury until later. Arthritis is another common cause of back pain. Although it may hurt a lot, back pain usually improves on its own within several weeks. Most people recover in 12 weeks or less. Using good home treatment and being careful not to stress your back can help you feel better sooner. Follow-up care is a key part of your treatment and safety. Be sure to make and go to all appointments, and call your doctor if you are having problems. Its also a good idea to know your test results and keep a list of the medicines you take. How can you care for yourself at home? · Sit or lie in positions that are most comfortable and reduce your pain. Try one of these positions when you lie down:  ¨ Lie on your back with your knees bent and supported by large pillows. ¨ Lie on the floor with your legs on the seat of a sofa or chair. Dallie Tamar on your side with your knees and hips bent and a pillow between your legs. ¨ Lie on your stomach if it does not make pain worse. · Do not sit up in bed, and avoid soft couches and twisted positions. Bed rest can help relieve pain at first, but it delays healing. Avoid bed rest after the first day of back pain. · Change positions every 30 minutes. If you must sit for long periods of time, take breaks from sitting. Get up and walk around, or lie in a comfortable position. · Try using a heating pad on a low or medium setting for 15 to 20 minutes every 2 or 3 hours.  Try a warm shower in place of one session with the heating pad. · You can also try an ice pack for 10 to 15 minutes every 2 to 3 hours. Put a thin cloth between the ice pack and your skin. · Take pain medicines exactly as directed. ¨ If the doctor gave you a prescription medicine for pain, take it as prescribed. ¨ If you are not taking a prescription pain medicine, ask your doctor if you can take an over-the-counter medicine. · Take short walks several times a day. You can start with 5 to 10 minutes, 3 or 4 times a day, and work up to longer walks. Walk on level surfaces and avoid hills and stairs until your back is better. · Return to work and other activities as soon as you can. Continued rest without activity is usually not good for your back. · To prevent future back pain, do exercises to stretch and strengthen your back and stomach. Learn how to use good posture, safe lifting techniques, and proper body mechanics. When should you call for help? Call your doctor now or seek immediate medical care if:  · You have new or worsening numbness in your legs. · You have new or worsening weakness in your legs. (This could make it hard to stand up.)  · You lose control of your bladder or bowels. Watch closely for changes in your health, and be sure to contact your doctor if:  · Your pain gets worse. · You are not getting better after 2 weeks. Where can you learn more? Go to http://flory-tyra.info/. Enter E615 in the search box to learn more about \"Back Pain: Care Instructions. \"  Current as of: May 23, 2016  Content Version: 11.1  © 5573-6789 [employer]. Care instructions adapted under license by [employer] (which disclaims liability or warranty for this information). If you have questions about a medical condition or this instruction, always ask your healthcare professional. [address] any warranty or liability for your use of this information. Acute Low Back Pain: Exercises  Your Care Instructions  Here are some examples of typical rehabilitation exercises for your condition. Start each exercise slowly. Ease off the exercise if you start to have pain. Your doctor or physical therapist will tell you when you can start these exercises and which ones will work best for you. When you are not being active, find a comfortable position for rest. Some people are comfortable on the floor or a medium-firm bed with a small pillow under their head and another under their knees. Some people prefer to lie on their side with a pillow between their knees. Don't stay in one position for too long. Take short walks (10 to 20 minutes) every 2 to 3 hours. Avoid slopes, hills, and stairs until you feel better. Walk only distances you can manage without pain, especially leg pain. How to do the exercises  Back stretches    1. Get down on your hands and knees on the floor. 2. Relax your head and allow it to droop. Round your back up toward the ceiling until you feel a nice stretch in your upper, middle, and lower back. Hold this stretch for as long as it feels comfortable, or about 15 to 30 seconds. 3. Return to the starting position with a flat back while you are on your hands and knees. 4. Let your back sway by pressing your stomach toward the floor. Lift your buttocks toward the ceiling. 5. Hold this position for 15 to 30 seconds. 6. Repeat 2 to 4 times. Follow-up care is a key part of your treatment and safety. Be sure to make and go to all appointments, and call your doctor if you are having problems. It's also a good idea to know your test results and keep a list of the medicines you take. Where can you learn more? Go to http://flory-tyra.info/. Enter C553 in the search box to learn more about \"Acute Low Back Pain: Exercises. \"  Current as of: May 23, 2016  Content Version: 11.1  © 9355-3628 [employer].  Care instructions adapted under license by [employer] (which disclaims liability or warranty for this information). If you have questions about a medical condition or this instruction, always ask your healthcare professional. [address] any warranty or liability for your use of this information.

## 2017-02-05 NOTE — ED TRIAGE NOTES
Pt returns here with chronic lower back pain with radiation into each leg. Pt also reports intermittent incontinence for the last 2 weeks.

## 2017-02-05 NOTE — ED NOTES
Attempted to d/c pt and pt started screaming and stated she was not going anywhere. Primary RN, Anders Garcia, aware and at bedside-will notify provider.

## 2017-02-05 NOTE — ED NOTES
Attempted to sit patient up for discharge, remained in prone position attempted to bend her right knee up, screamed out \"I\"m not moving or  going anywhere until this pain is controlled\" Dr. Amy Malcolm notified.

## 2017-02-08 ENCOUNTER — APPOINTMENT (OUTPATIENT)
Dept: MRI IMAGING | Age: 57
End: 2017-02-08
Attending: PHYSICIAN ASSISTANT
Payer: COMMERCIAL

## 2017-02-08 ENCOUNTER — APPOINTMENT (OUTPATIENT)
Dept: GENERAL RADIOLOGY | Age: 57
End: 2017-02-08
Attending: PHYSICIAN ASSISTANT
Payer: COMMERCIAL

## 2017-02-08 ENCOUNTER — HOSPITAL ENCOUNTER (OUTPATIENT)
Age: 57
Setting detail: OBSERVATION
Discharge: HOME | End: 2017-02-09
Attending: EMERGENCY MEDICINE | Admitting: ORTHOPAEDIC SURGERY
Payer: COMMERCIAL

## 2017-02-08 ENCOUNTER — APPOINTMENT (OUTPATIENT)
Dept: CT IMAGING | Age: 57
End: 2017-02-08
Attending: PHYSICIAN ASSISTANT
Payer: COMMERCIAL

## 2017-02-08 DIAGNOSIS — M54.50 CHRONIC BILATERAL LOW BACK PAIN WITHOUT SCIATICA: Primary | ICD-10-CM

## 2017-02-08 DIAGNOSIS — G89.29 CHRONIC BILATERAL LOW BACK PAIN WITHOUT SCIATICA: Primary | ICD-10-CM

## 2017-02-08 LAB
ANION GAP BLD CALC-SCNC: 11 MMOL/L (ref 5–15)
APPEARANCE UR: CLEAR
APTT PPP: 27.1 SEC (ref 22.1–32.5)
BACTERIA URNS QL MICRO: NEGATIVE /HPF
BASOPHILS # BLD AUTO: 0 K/UL (ref 0–0.1)
BASOPHILS # BLD: 0 % (ref 0–1)
BILIRUB UR QL: NEGATIVE
BUN SERPL-MCNC: 26 MG/DL (ref 6–20)
BUN/CREAT SERPL: 27 (ref 12–20)
CALCIUM SERPL-MCNC: 8.7 MG/DL (ref 8.5–10.1)
CAOX CRY URNS QL MICRO: ABNORMAL
CHLORIDE SERPL-SCNC: 99 MMOL/L (ref 97–108)
CO2 SERPL-SCNC: 23 MMOL/L (ref 21–32)
COLOR UR: ABNORMAL
CREAT SERPL-MCNC: 0.97 MG/DL (ref 0.55–1.02)
EOSINOPHIL # BLD: 0.1 K/UL (ref 0–0.4)
EOSINOPHIL NFR BLD: 1 % (ref 0–7)
EPITH CASTS URNS QL MICRO: ABNORMAL /LPF
ERYTHROCYTE [DISTWIDTH] IN BLOOD BY AUTOMATED COUNT: 13.9 % (ref 11.5–14.5)
GLUCOSE SERPL-MCNC: 103 MG/DL (ref 65–100)
GLUCOSE UR STRIP.AUTO-MCNC: NEGATIVE MG/DL
HCT VFR BLD AUTO: 32.6 % (ref 35–47)
HGB BLD-MCNC: 10.8 G/DL (ref 11.5–16)
HGB UR QL STRIP: NEGATIVE
INR PPP: 1.1 (ref 0.9–1.1)
KETONES UR QL STRIP.AUTO: NEGATIVE MG/DL
LEUKOCYTE ESTERASE UR QL STRIP.AUTO: NEGATIVE
LYMPHOCYTES # BLD AUTO: 12 % (ref 12–49)
LYMPHOCYTES # BLD: 2.3 K/UL (ref 0.8–3.5)
MCH RBC QN AUTO: 32.4 PG (ref 26–34)
MCHC RBC AUTO-ENTMCNC: 33.1 G/DL (ref 30–36.5)
MCV RBC AUTO: 97.9 FL (ref 80–99)
MONOCYTES # BLD: 1.3 K/UL (ref 0–1)
MONOCYTES NFR BLD AUTO: 7 % (ref 5–13)
NEUTS SEG # BLD: 15.8 K/UL (ref 1.8–8)
NEUTS SEG NFR BLD AUTO: 80 % (ref 32–75)
NITRITE UR QL STRIP.AUTO: NEGATIVE
PH UR STRIP: 6 [PH] (ref 5–8)
PLATELET # BLD AUTO: 352 K/UL (ref 150–400)
POTASSIUM SERPL-SCNC: 4.5 MMOL/L (ref 3.5–5.1)
PROT UR STRIP-MCNC: NEGATIVE MG/DL
PROTHROMBIN TIME: 11.6 SEC (ref 9–11.1)
RBC # BLD AUTO: 3.33 M/UL (ref 3.8–5.2)
RBC #/AREA URNS HPF: ABNORMAL /HPF (ref 0–5)
SODIUM SERPL-SCNC: 133 MMOL/L (ref 136–145)
SP GR UR REFRACTOMETRY: 1.02 (ref 1–1.03)
THERAPEUTIC RANGE,PTTT: NORMAL SECS (ref 58–77)
UA: UC IF INDICATED,UAUC: ABNORMAL
UROBILINOGEN UR QL STRIP.AUTO: 0.2 EU/DL (ref 0.2–1)
WBC # BLD AUTO: 19.6 K/UL (ref 3.6–11)
WBC URNS QL MICRO: ABNORMAL /HPF (ref 0–4)

## 2017-02-08 PROCEDURE — 99218 HC RM OBSERVATION: CPT

## 2017-02-08 PROCEDURE — 96374 THER/PROPH/DIAG INJ IV PUSH: CPT

## 2017-02-08 PROCEDURE — 36415 COLL VENOUS BLD VENIPUNCTURE: CPT | Performed by: PHYSICIAN ASSISTANT

## 2017-02-08 PROCEDURE — 51798 US URINE CAPACITY MEASURE: CPT

## 2017-02-08 PROCEDURE — 72100 X-RAY EXAM L-S SPINE 2/3 VWS: CPT

## 2017-02-08 PROCEDURE — 80048 BASIC METABOLIC PNL TOTAL CA: CPT | Performed by: PHYSICIAN ASSISTANT

## 2017-02-08 PROCEDURE — 96361 HYDRATE IV INFUSION ADD-ON: CPT

## 2017-02-08 PROCEDURE — 99284 EMERGENCY DEPT VISIT MOD MDM: CPT

## 2017-02-08 PROCEDURE — 85730 THROMBOPLASTIN TIME PARTIAL: CPT | Performed by: PHYSICIAN ASSISTANT

## 2017-02-08 PROCEDURE — 74011250636 HC RX REV CODE- 250/636: Performed by: PHYSICIAN ASSISTANT

## 2017-02-08 PROCEDURE — 74011250637 HC RX REV CODE- 250/637: Performed by: PHYSICIAN ASSISTANT

## 2017-02-08 PROCEDURE — 81001 URINALYSIS AUTO W/SCOPE: CPT | Performed by: PHYSICIAN ASSISTANT

## 2017-02-08 PROCEDURE — 72128 CT CHEST SPINE W/O DYE: CPT

## 2017-02-08 PROCEDURE — 72125 CT NECK SPINE W/O DYE: CPT

## 2017-02-08 PROCEDURE — 85610 PROTHROMBIN TIME: CPT | Performed by: PHYSICIAN ASSISTANT

## 2017-02-08 PROCEDURE — 72131 CT LUMBAR SPINE W/O DYE: CPT

## 2017-02-08 PROCEDURE — 85025 COMPLETE CBC W/AUTO DIFF WBC: CPT | Performed by: PHYSICIAN ASSISTANT

## 2017-02-08 PROCEDURE — 96375 TX/PRO/DX INJ NEW DRUG ADDON: CPT

## 2017-02-08 RX ORDER — ALBUTEROL SULFATE 90 UG/1
2 AEROSOL, METERED RESPIRATORY (INHALATION)
COMMUNITY

## 2017-02-08 RX ORDER — FLUTICASONE FUROATE AND VILANTEROL 100; 25 UG/1; UG/1
1 POWDER RESPIRATORY (INHALATION) DAILY
Status: DISCONTINUED | OUTPATIENT
Start: 2017-02-09 | End: 2017-02-09 | Stop reason: HOSPADM

## 2017-02-08 RX ORDER — CYCLOSPORINE 0.5 MG/ML
1 EMULSION OPHTHALMIC 2 TIMES DAILY
Status: DISCONTINUED | OUTPATIENT
Start: 2017-02-09 | End: 2017-02-09 | Stop reason: HOSPADM

## 2017-02-08 RX ORDER — DIAZEPAM 5 MG/1
10 TABLET ORAL ONCE
Status: DISCONTINUED | OUTPATIENT
Start: 2017-02-08 | End: 2017-02-08

## 2017-02-08 RX ORDER — NEBIVOLOL 5 MG/1
10 TABLET ORAL DAILY
Status: DISCONTINUED | OUTPATIENT
Start: 2017-02-09 | End: 2017-02-09 | Stop reason: HOSPADM

## 2017-02-08 RX ORDER — SODIUM CHLORIDE TAB 1 GM 1 G
1 TAB MISCELLANEOUS DAILY
Status: DISCONTINUED | OUTPATIENT
Start: 2017-02-09 | End: 2017-02-09 | Stop reason: HOSPADM

## 2017-02-08 RX ORDER — SODIUM CHLORIDE 0.9 % (FLUSH) 0.9 %
5-10 SYRINGE (ML) INJECTION EVERY 8 HOURS
Status: DISCONTINUED | OUTPATIENT
Start: 2017-02-08 | End: 2017-02-09 | Stop reason: HOSPADM

## 2017-02-08 RX ORDER — LANOLIN ALCOHOL/MO/W.PET/CERES
100 CREAM (GRAM) TOPICAL DAILY
Status: DISCONTINUED | OUTPATIENT
Start: 2017-02-09 | End: 2017-02-09 | Stop reason: HOSPADM

## 2017-02-08 RX ORDER — CHOLECALCIFEROL (VITAMIN D3) 125 MCG
2000 CAPSULE ORAL DAILY
COMMUNITY

## 2017-02-08 RX ORDER — PYRIDOXINE HCL (VITAMIN B6) 100 MG
100 TABLET ORAL DAILY
COMMUNITY

## 2017-02-08 RX ORDER — SODIUM CHLORIDE 0.9 % (FLUSH) 0.9 %
5-10 SYRINGE (ML) INJECTION AS NEEDED
Status: DISCONTINUED | OUTPATIENT
Start: 2017-02-08 | End: 2017-02-09 | Stop reason: HOSPADM

## 2017-02-08 RX ORDER — LANOLIN ALCOHOL/MO/W.PET/CERES
1000 CREAM (GRAM) TOPICAL DAILY
Status: DISCONTINUED | OUTPATIENT
Start: 2017-02-09 | End: 2017-02-09 | Stop reason: HOSPADM

## 2017-02-08 RX ORDER — LISINOPRIL 20 MG/1
20 TABLET ORAL DAILY
Status: DISCONTINUED | OUTPATIENT
Start: 2017-02-09 | End: 2017-02-09 | Stop reason: HOSPADM

## 2017-02-08 RX ORDER — HYDROMORPHONE HYDROCHLORIDE 1 MG/ML
1 INJECTION, SOLUTION INTRAMUSCULAR; INTRAVENOUS; SUBCUTANEOUS
Status: DISCONTINUED | OUTPATIENT
Start: 2017-02-08 | End: 2017-02-09 | Stop reason: HOSPADM

## 2017-02-08 RX ORDER — GABAPENTIN 100 MG/1
100 CAPSULE ORAL 2 TIMES DAILY
Status: DISCONTINUED | OUTPATIENT
Start: 2017-02-08 | End: 2017-02-09 | Stop reason: HOSPADM

## 2017-02-08 RX ORDER — DIPHENHYDRAMINE HCL 25 MG
25 CAPSULE ORAL
Status: DISCONTINUED | OUTPATIENT
Start: 2017-02-08 | End: 2017-02-09 | Stop reason: HOSPADM

## 2017-02-08 RX ORDER — DEXAMETHASONE SODIUM PHOSPHATE 4 MG/ML
10 INJECTION, SOLUTION INTRA-ARTICULAR; INTRALESIONAL; INTRAMUSCULAR; INTRAVENOUS; SOFT TISSUE EVERY 8 HOURS
Status: COMPLETED | OUTPATIENT
Start: 2017-02-08 | End: 2017-02-09

## 2017-02-08 RX ORDER — THERA TABS 400 MCG
1 TAB ORAL DAILY
COMMUNITY

## 2017-02-08 RX ORDER — GEMFIBROZIL 600 MG/1
600 TABLET, FILM COATED ORAL
COMMUNITY

## 2017-02-08 RX ORDER — ESOMEPRAZOLE MAGNESIUM 40 MG/1
40 CAPSULE, DELAYED RELEASE ORAL
COMMUNITY
End: 2019-10-15

## 2017-02-08 RX ORDER — SULINDAC 200 MG/1
200 TABLET ORAL 2 TIMES DAILY
COMMUNITY
End: 2018-10-09

## 2017-02-08 RX ORDER — CYCLOBENZAPRINE HCL 10 MG
10 TABLET ORAL
Status: DISCONTINUED | OUTPATIENT
Start: 2017-02-08 | End: 2017-02-08

## 2017-02-08 RX ORDER — ASPIRIN 81 MG/1
81 TABLET ORAL DAILY
COMMUNITY
End: 2018-10-09

## 2017-02-08 RX ORDER — DEXAMETHASONE SODIUM PHOSPHATE 4 MG/ML
10 INJECTION, SOLUTION INTRA-ARTICULAR; INTRALESIONAL; INTRAMUSCULAR; INTRAVENOUS; SOFT TISSUE ONCE
Status: DISCONTINUED | OUTPATIENT
Start: 2017-02-08 | End: 2017-02-08

## 2017-02-08 RX ORDER — AMLODIPINE BESYLATE 5 MG/1
5 TABLET ORAL EVERY EVENING
Status: DISCONTINUED | OUTPATIENT
Start: 2017-02-08 | End: 2017-02-09 | Stop reason: HOSPADM

## 2017-02-08 RX ORDER — NEBIVOLOL 10 MG/1
10 TABLET ORAL DAILY
COMMUNITY
End: 2020-10-16

## 2017-02-08 RX ORDER — ALBUTEROL SULFATE 90 UG/1
2 AEROSOL, METERED RESPIRATORY (INHALATION)
Status: DISCONTINUED | OUTPATIENT
Start: 2017-02-08 | End: 2017-02-09 | Stop reason: HOSPADM

## 2017-02-08 RX ORDER — FOLIC ACID 1 MG/1
2 TABLET ORAL DAILY
Status: DISCONTINUED | OUTPATIENT
Start: 2017-02-09 | End: 2017-02-09 | Stop reason: HOSPADM

## 2017-02-08 RX ORDER — PANTOPRAZOLE SODIUM 40 MG/1
40 TABLET, DELAYED RELEASE ORAL
Status: DISCONTINUED | OUTPATIENT
Start: 2017-02-09 | End: 2017-02-09 | Stop reason: HOSPADM

## 2017-02-08 RX ORDER — FLUTICASONE PROPIONATE AND SALMETEROL 250; 50 UG/1; UG/1
1 POWDER RESPIRATORY (INHALATION) EVERY 12 HOURS
COMMUNITY
End: 2022-03-15

## 2017-02-08 RX ORDER — ONDANSETRON 2 MG/ML
4 INJECTION INTRAMUSCULAR; INTRAVENOUS
Status: DISCONTINUED | OUTPATIENT
Start: 2017-02-08 | End: 2017-02-09 | Stop reason: HOSPADM

## 2017-02-08 RX ORDER — METHOTREXATE 2.5 MG/1
17.5 TABLET ORAL
COMMUNITY
End: 2020-10-16

## 2017-02-08 RX ORDER — CYCLOSPORINE 0.5 MG/ML
1 EMULSION OPHTHALMIC 2 TIMES DAILY
COMMUNITY

## 2017-02-08 RX ORDER — DIAZEPAM 5 MG/1
5 TABLET ORAL
Status: DISCONTINUED | OUTPATIENT
Start: 2017-02-08 | End: 2017-02-09 | Stop reason: HOSPADM

## 2017-02-08 RX ORDER — AMLODIPINE BESYLATE 5 MG/1
5 TABLET ORAL EVERY EVENING
COMMUNITY
End: 2018-10-09

## 2017-02-08 RX ORDER — OXYCODONE AND ACETAMINOPHEN 5; 325 MG/1; MG/1
1 TABLET ORAL
Status: DISCONTINUED | OUTPATIENT
Start: 2017-02-08 | End: 2017-02-09 | Stop reason: HOSPADM

## 2017-02-08 RX ORDER — SUCRALFATE 1 G/1
1 TABLET ORAL
Status: DISCONTINUED | OUTPATIENT
Start: 2017-02-09 | End: 2017-02-09 | Stop reason: HOSPADM

## 2017-02-08 RX ORDER — SODIUM CHLORIDE TAB 1 GM 1 G
1 TAB MISCELLANEOUS
COMMUNITY

## 2017-02-08 RX ORDER — GEMFIBROZIL 600 MG/1
600 TABLET, FILM COATED ORAL
Status: DISCONTINUED | OUTPATIENT
Start: 2017-02-09 | End: 2017-02-09 | Stop reason: HOSPADM

## 2017-02-08 RX ORDER — LISINOPRIL 20 MG/1
20 TABLET ORAL DAILY
COMMUNITY
End: 2019-10-15

## 2017-02-08 RX ORDER — SUCRALFATE 1 G/1
1 TABLET ORAL
COMMUNITY
End: 2019-10-15

## 2017-02-08 RX ORDER — LANOLIN ALCOHOL/MO/W.PET/CERES
1000 CREAM (GRAM) TOPICAL DAILY
COMMUNITY

## 2017-02-08 RX ORDER — GABAPENTIN 100 MG/1
100 CAPSULE ORAL 2 TIMES DAILY
COMMUNITY
End: 2018-10-09

## 2017-02-08 RX ORDER — METHOTREXATE 2.5 MG/1
17.5 TABLET ORAL
Status: DISCONTINUED | OUTPATIENT
Start: 2017-02-10 | End: 2017-02-09 | Stop reason: HOSPADM

## 2017-02-08 RX ORDER — SPIRONOLACTONE 25 MG/1
25 TABLET ORAL DAILY
Status: DISCONTINUED | OUTPATIENT
Start: 2017-02-09 | End: 2017-02-09 | Stop reason: HOSPADM

## 2017-02-08 RX ORDER — SPIRONOLACTONE 25 MG/1
25 TABLET ORAL DAILY
COMMUNITY
End: 2019-10-15

## 2017-02-08 RX ORDER — FOLIC ACID 1 MG/1
2 TABLET ORAL DAILY
COMMUNITY

## 2017-02-08 RX ORDER — HYDROXYCHLOROQUINE SULFATE 200 MG/1
200 TABLET, FILM COATED ORAL 2 TIMES DAILY
Status: DISCONTINUED | OUTPATIENT
Start: 2017-02-09 | End: 2017-02-09 | Stop reason: HOSPADM

## 2017-02-08 RX ORDER — SODIUM CHLORIDE 9 MG/ML
100 INJECTION, SOLUTION INTRAVENOUS CONTINUOUS
Status: DISCONTINUED | OUTPATIENT
Start: 2017-02-08 | End: 2017-02-09 | Stop reason: HOSPADM

## 2017-02-08 RX ORDER — MELATONIN
2000 DAILY
Status: DISCONTINUED | OUTPATIENT
Start: 2017-02-09 | End: 2017-02-09 | Stop reason: HOSPADM

## 2017-02-08 RX ORDER — HYDROXYCHLOROQUINE SULFATE 200 MG/1
200 TABLET, FILM COATED ORAL 2 TIMES DAILY
COMMUNITY

## 2017-02-08 RX ORDER — OXYCODONE AND ACETAMINOPHEN 10; 325 MG/1; MG/1
1 TABLET ORAL
Status: DISCONTINUED | OUTPATIENT
Start: 2017-02-08 | End: 2017-02-09 | Stop reason: HOSPADM

## 2017-02-08 RX ADMIN — GABAPENTIN 100 MG: 100 CAPSULE ORAL at 22:10

## 2017-02-08 RX ADMIN — Medication 10 ML: at 22:11

## 2017-02-08 RX ADMIN — OXYCODONE HYDROCHLORIDE AND ACETAMINOPHEN 1 TABLET: 5; 325 TABLET ORAL at 21:52

## 2017-02-08 RX ADMIN — DIAZEPAM 5 MG: 5 TABLET ORAL at 22:47

## 2017-02-08 RX ADMIN — HYDROMORPHONE HYDROCHLORIDE 1 MG: 1 INJECTION, SOLUTION INTRAMUSCULAR; INTRAVENOUS; SUBCUTANEOUS at 19:03

## 2017-02-08 RX ADMIN — AMLODIPINE BESYLATE 5 MG: 5 TABLET ORAL at 22:10

## 2017-02-08 RX ADMIN — SODIUM CHLORIDE 100 ML/HR: 900 INJECTION, SOLUTION INTRAVENOUS at 19:01

## 2017-02-08 RX ADMIN — DEXAMETHASONE SODIUM PHOSPHATE 10 MG: 4 INJECTION, SOLUTION INTRAMUSCULAR; INTRAVENOUS at 21:52

## 2017-02-08 NOTE — ED TRIAGE NOTES
Patient reports she was seen Thursday and Sunday for back pain. She saw Ortho today and was referred here for a \"stat MRI\" per WILL Rodas. Patient reports she began having bowel incontinence on Monday. Patient ambulatory without incidence with a cane.

## 2017-02-08 NOTE — ED PROVIDER NOTES
HPI Comments: 60 y.o. female with extensive past medical history, please see list, significant for HTN, seizures, EtOH abuse, stroke, asthma, chronic back pain, DDD, DJD, hyponatremia, tobacco abuse, skin cancer, lupus, hernia, GERD, cardiac murmur, and mixed connective tissue disease who presents to the ED with chief complaint of back pain. Pt reports lower back pain accompanied by leg pain, numbness/tingling, and bowel incontinence, says she is \"dribbling. \" Pt was seen at [facility] ED 6 days ago and 4 days ago for back pain and was seen by Orthopedics Wali Jasso) today and referred to the ED for further evaluation and for a \"stat MRI. \" Pt states she has hx of lumbar fusion and decompression in 2012 and has a bone stimulator. Pt was ambulatory upon arrival to the ED. Pt denies urine incontinence. There are no other acute medical complaints voiced at this time. PCP: Huong Ren. Emmie Reyes MD    Note written by Corina Chávez, as dictated by Cristin Machuca MD 5:01 PM     The history is provided by the patient.         Past Medical History:   Diagnosis Date    Asthma      activity induced asthma     Autoimmune disease ([address]      mixed connective tissue disease    Cancer ([address]      skin-basal    Chronic pain      lower back, joints    DDD (degenerative disc disease), lumbar     DJD (degenerative joint disease)     ETOH abuse      Sober 12-13-11    GERD (gastroesophageal reflux disease)     Hernia     Hx of bronchitis     Hx of mammogram 2/18/15     Found a 1.2cm mass = cyst, benign    Hx of seizure disorder 2010     unknown cause, none since    Hx of sinusitis     Hyperlipemia     Hypertension     Hypertriglyceridemia     Hyponatremia     Insomnia     Lupus (HCC)     Murmur, cardiac     Neuropathic pain     Other bursitis of hip, right hip 09/14/2016     Had a deep hip injection for pain     Routine Papanicolaou smear 09/20/2016     Negative (no hpv)     Stroke ([address] 3/11     PCP states she had a TIA - patient denies this (was low Na)    Tobacco abuse      Stopped in 2012       Past Surgical History:   Procedure Laterality Date    Hx tonsil and adenoidectomy  1962    Hx lumbar laminectomy       rods in 1996, out 1998    Hx lumbar fusion       10/8/2012    Hx lap cholecystectomy  3/11/2014    Hx hernia repair  10/7/2014     incisional with mesh    Hx other surgical  10/08/2012     [facility] Bone Growth Stimulator    Hx total abdominal hysterectomy  12/20/1992     fibroids; Dr. Carli Morrell Hx cyst incision and drainage Left 08/2016    Hx orthopaedic  1996, 1998     back surgery    Hx orthopaedic Bilateral 2013 R, 2014 L     carpal tunnel    Hx orthopaedic  6/27/2013     right knee arthroscopy    Hx orthopaedic Right 3/2015     trigger thumb release    Hx orthopaedic  10/8/2012     ALIF/ PLIF with bone stimulator    Hx orthopaedic  10/23/2012     Back surgery to adjust hardware         Family History:   Problem Relation Age of Onset    Hypertension Mother     Stroke Mother      TIA    Arthritis-osteo Father      RA    No Known Problems Sister     No Known Problems Brother     No Known Problems Sister     No Known Problems Sister        Social History     Social History    Marital status:      Spouse name: N/A    Number of children: N/A    Years of education: N/A     Occupational History    Not on file. Social History Main Topics    Smoking status: Former Smoker     Packs/day: 2.00     Years: 32.00     Quit date: 7/1/2012    Smokeless tobacco: Never Used      Comment: Never used vapor or e-cigs     Alcohol use No      Comment: Sober 4 years    Drug use: No    Sexual activity: Not Currently     Birth control/ protection: Surgical     Other Topics Concern    Not on file     Social History Narrative         ALLERGIES: Penicillins    Review of Systems   Constitutional: Negative. Negative for appetite change, fever and unexpected weight change. HENT: Negative. Negative for ear pain, hearing loss, nosebleeds, rhinorrhea, sore throat and trouble swallowing. Respiratory: Negative. Negative for cough, chest tightness and shortness of breath. Cardiovascular: Negative. Negative for chest pain and palpitations. Gastrointestinal: Negative. Negative for abdominal distention, abdominal pain, blood in stool and vomiting. Endocrine: Negative. Genitourinary: Negative for difficulty urinating, dysuria and hematuria. +bowel incontinence   Musculoskeletal: Positive for back pain (lower). +leg pain   Skin: Negative. Negative for rash. Allergic/Immunologic: Negative. Neurological: Positive for numbness. Negative for dizziness, syncope and weakness. Hematological: Negative. Psychiatric/Behavioral: Negative. All other systems reviewed and are negative. Vitals:    02/08/17 1652   BP: 161/76   Pulse: 83   Resp: 16   Temp: 98.2 °F (36.8 °C)   SpO2: 100%   Weight: 77.1 kg (170 lb)   Height: 5' 5\" (1.651 m)            Physical Exam   Constitutional: She is oriented to person, place, and time. She appears well-developed and well-nourished. No distress. HENT:   Head: Normocephalic and atraumatic. Right Ear: External ear normal.   Left Ear: External ear normal.   Nose: Nose normal.   Mouth/Throat: Oropharynx is clear and moist.   Eyes: Conjunctivae and EOM are normal. Pupils are equal, round, and reactive to light. Neck: Normal range of motion. Neck supple. No JVD present. No thyromegaly present. Cardiovascular: Normal rate, regular rhythm, normal heart sounds and intact distal pulses. No murmur heard. Pulmonary/Chest: Effort normal and breath sounds normal. No respiratory distress. She has no wheezes. She has no rales. Abdominal: Soft. Bowel sounds are normal. She exhibits no distension. There is no tenderness. Genitourinary:   Genitourinary Comments: Normal rectal tone. No signs of bladder distension.    Musculoskeletal: Normal range of motion. She exhibits no edema. Subjective back tenderness to palpation. Neurological: She is alert and oriented to person, place, and time. No cranial nerve deficit. Normal strength and gait. No saddle anesthesia. Skin: Skin is warm and dry. No rash noted. Psychiatric: She has a normal mood and affect. Her behavior is normal. Thought content normal.   Nursing note and vitals reviewed. Note written by Corina Benavides, as dictated by Moy Jurado MD 5:02 PM    [facility]  ED Course       Procedures    CONSULT NOTE:  5:08 PM Moy Jurado MD spoke with WILL Sauceda, Consult for Orthopedics. Discussed available diagnostic tests and clinical findings. He is in agreement with care plans as outlined. WILL Sauceda will come up with a plan and call back. CONSULT NOTE:  5:45 PM Moy Jurado MD spoke with WILL Espinal, Consult for Orthopedics. Discussed available diagnostic tests and clinical findings. She is in agreement with care plans as outlined. WILL Espinal will admit pt.

## 2017-02-08 NOTE — IP AVS SNAPSHOT
[address] [facility], [place], operated by [facility] 
 
 
 [address] [place] [address] 
[phone] Patient: Saira Henson MRN: [id number] VTH:[id number] You are allergic to the following Allergen Reactions Penicillins Rash Fever. 9/14/16 Reports able to take Keflex without problem. Recent Documentation Height Weight Breastfeeding? BMI OB Status Smoking Status 1.651 m 77.1 kg No 28.29 kg/m2 Hysterectomy Former Smoker Emergency Contacts Name Discharge Info Relation Home Work Mobile Rebekah Paez DISCHARGE CAREGIVER [3] Spouse [3] [phone] About your hospitalization You were admitted on:  February 8, 2017 You last received care in the:  [facility] 4M POST SURG ORT 1 You were discharged on:  February 9, 2017 Unit phone number:  [phone] Why you were hospitalized Your primary diagnosis was:  Not on File Providers Seen During Your Hospitalizations Provider Role Specialty Primary office phone Burak Estrada MD Attending Provider Emergency Medicine [phone] Bib Gonzalez MD Attending Provider Orthopedic Surgery [phone] Your Primary Care Physician (PCP) Primary Care Physician Office Phone Office Fax Bryant Elliott [phone] Follow-up Information Follow up With Details Comments Contact Info Nicolasa Parth, [address] 
[phone] Your Appointments Tuesday February 14, 2017 11:00 AM EST  
XR MYELO LUMBAR with Mary Hassan MD, [facility] FLUORO 2, [facility] RN RESOURCE  
[facility] RADIOLOGY ([address]) [address] [place] [address]  
[phone] Patient needs to register 30 minutes prior to exam.  *YOU MUST HAVE A  TO AND FROM THE FACILITY.  * YOU ARE ADVISED TO DRINK AT AT LEAST 8 - 8OZ GLASSES OF WATER/JUICE THE DAY PRIOR TO PROCEDURE. * NOTHING TO EAT 4 HOURS PRIOR TO PROCEDURE. * NOTHING TO DRINK 2 HOURS PRIOR TO PROCEDURE. * HOLD ALL BLOOD THINNERS, ASPIRIN, ASPIRIN-LIKE PRODUCTS FOR 5-10 DAYS PRIOR TO THIS PROCEDURE. * YOU WILL BE AT THE FACILITY 2 TO 4 HOURS * PTO BE ON BED REST THE REMAINDER OF THE DAY OF THE EXAM. Park in designated visitor/patient parking. Enter through the main entrance, which is just to the left of the fountain. Once inside, go around the corner to the left. You will register in Outpatient Registration. Tuesday February 14, 2017 12:00 PM EST  
CT SPINE LUMB W CONT with [facility] CT 1 SFM RAD CT (Brissa Delvalle) [address] [facility] [address] [facility]  
[phone] CONTRAST STUDY: 1. The patient should not eat solid food four hours before the appointment but should be encouraged to drink clear liquids. 2. The patient will require IV access for contrast administration. 3. The patient should not take  Ibuprofen (Advil, Motrin, etc.) and Naproxen Sodium (Aleve, etc.)  on the day of the exam. Stopping non-steroidal anti-inflammatory agents (NSAIDs) like Ibuprofen decreases the risk of kidney damage from the x-ray contrast (dye). 4. Bring any non [facility] facility films/images pertaining to the area of interest with you on the day of appointment. 5. Bring current lab work if available(within last 90 days CMP) ***If scheduled at [facility], iSTAT is not available, labs will need to be done before appointment*** 6. Check in at registration at least 30 minutes before appt time unless you were instructed to do otherwise. 7. If you have to drink oral contrast please pick it up any weekday prior to your appointment, if you cannot please check in 2 hrs  before appt time. Park in designated visitor/patient parking. Enter through the main entrance, which is just to the left of the fountain. Once inside, go around the corner to the left. You will register in Outpatient Registration. Current Discharge Medication List  
  
CONTINUE these medications which have CHANGED Dose & Instructions Dispensing Information Comments Morning Noon Evening Bedtime  
 oxyCODONE-acetaminophen 5-325 mg per tablet Commonly known as:  PERCOCET What changed:   
- how much to take - when to take this Your next dose is: Today, Tomorrow Other:  _________ Dose:  1-2 Tab Take 1-2 Tabs by mouth every four (4) hours as needed for Pain. Max Daily Amount: 12 Tabs. Quantity:  60 Tab Refills:  0 CONTINUE these medications which have NOT CHANGED Dose & Instructions Dispensing Information Comments Morning Noon Evening Bedtime ADVAIR DISKUS 250-50 mcg/dose diskus inhaler Generic drug:  fluticasone-salmeterol Your next dose is: Today, Tomorrow Other:  _________ Dose:  1 Puff Take 1 Puff by inhalation every twelve (12) hours. Refills:  0  
     
   
   
   
  
 albuterol 90 mcg/actuation inhaler Commonly known as:  PROVENTIL HFA, VENTOLIN HFA, PROAIR HFA Your next dose is: Today, Tomorrow Other:  _________ Dose:  2 Puff Take 2 Puffs by inhalation every six (6) hours as needed for Wheezing. Refills:  0  
     
   
   
   
  
 amLODIPine 5 mg tablet Commonly known as:  Chilkat Citron Your next dose is: Today, Tomorrow Other:  _________ Dose:  5 mg Take 5 mg by mouth every evening. Refills:  0  
     
   
   
   
  
 aspirin delayed-release 81 mg tablet Your next dose is: Today, Tomorrow Other:  _________ Dose:  81 mg Take 81 mg by mouth daily. Refills:  0  
     
   
   
   
  
 BYSTOLIC 10 mg tablet Generic drug:  nebivolol Your next dose is: Today, Tomorrow Other:  _________ Dose:  10 mg Take 10 mg by mouth daily. Refills:  0  
     
   
   
   
  
 cyanocobalamin 1,000 mcg tablet Your next dose is: Today, Tomorrow Other:  _________ Dose:  1000 mcg Take 1,000 mcg by mouth daily. Refills:  0  
     
   
   
   
  
 diazePAM 5 mg tablet Commonly known as:  VALIUM Your next dose is: Today, Tomorrow Other:  _________ Dose:  5 mg Take 1 Tab by mouth three (3) times daily as needed (spasm). Max Daily Amount: 15 mg. Quantity:  40 Tab Refills:  0 Estradiol 0.5 mg/0.5 gram (0.1 %) Glpk Commonly known as:  Juanice Scheuermann Your next dose is: Today, Tomorrow Other:  _________ Dose:  1 Packet 1 Packet by TransDERmal route daily. Apply 1 packet to thigh daily Quantity:  90 Packet Refills:  3  
     
   
   
   
  
 folic acid 1 mg tablet Commonly known as:  Google Your next dose is: Today, Tomorrow Other:  _________ Dose:  2 mg Take 2 mg by mouth daily. Refills:  0  
     
   
   
   
  
 gabapentin 100 mg capsule Commonly known as:  NEURONTIN Your next dose is: Today, Tomorrow Other:  _________ Dose:  100 mg Take 100 mg by mouth two (2) times a day. Refills:  0  
     
   
   
   
  
 lidocaine 5 % Commonly known as:  Farhana Prophet Your next dose is: Today, Tomorrow Other:  _________ Apply patch to the affected area for 12 hours a day as needed for pain and remove for 12 hours a day. Quantity:  15 Each Refills:  0  
     
   
   
   
  
 lisinopril 20 mg tablet Commonly known as:  Tejas Monroe Your next dose is: Today, Tomorrow Other:  _________ Dose:  20 mg Take 20 mg by mouth daily. Refills:  0 LOPID 600 mg tablet Generic drug:  gemfibrozil Your next dose is: Today, Tomorrow Other:  _________ Dose:  600 mg Take 600 mg by mouth Before breakfast and dinner. Refills:  0  
     
   
   
   
  
 methotrexate 2.5 mg tablet Commonly known as:  Willma Bellis Your next dose is: Today, Tomorrow Other:  _________ Dose:  17.5 mg Take 17.5 mg by mouth Every Friday. Refills:  0 NexIUM 40 mg capsule Generic drug:  esomeprazole Your next dose is: Today, Tomorrow Other:  _________ Dose:  40 mg Take 40 mg by mouth Before breakfast and dinner. Refills:  0  
     
   
   
   
  
 PLAQUENIL 200 mg tablet Generic drug:  hydroxychloroquine Your next dose is: Today, Tomorrow Other:  _________ Dose:  200 mg Take 200 mg by mouth two (2) times a day. Refills:  0  
     
   
   
   
  
 predniSONE 10 mg dose pack Commonly known as:  STERAPRED DS Your next dose is: Today, Tomorrow Other:  _________ Take according to dose pack instructions. Quantity:  21 Tab Refills:  0  
     
   
   
   
  
 pyridoxine (vitamin B6) 100 mg tablet Commonly known as:  VITAMIN B-6 Your next dose is: Today, Tomorrow Other:  _________ Dose:  100 mg Take 100 mg by mouth daily. Refills:  0  
     
   
   
   
  
 RESTASIS 0.05 % ophthalmic emulsion Generic drug:  cycloSPORINE Your next dose is: Today, Tomorrow Other:  _________ Dose:  1 Drop Administer 1 Drop to both eyes two (2) times a day. Refills:  0 SAVELLA 50 mg tablet Generic drug:  Milnacipran Your next dose is: Today, Tomorrow Other:  _________ Dose:  50 mg Take 50 mg by mouth two (2) times a day. Refills:  0  
     
   
   
   
  
 sodium chloride 1 gram tablet Your next dose is: Today, Tomorrow Other:  _________ Dose:  1 g Take 1 g by mouth daily. Refills:  0  
     
   
   
   
  
 spironolactone 25 mg tablet Commonly known as:  ALDACTONE Your next dose is: Today, Tomorrow Other:  _________  Dose:  25 mg  
 Take 25 mg by mouth daily. Refills:  0  
     
   
   
   
  
 sucralfate 1 gram tablet Commonly known as:  [place] Bi Your next dose is: Today, Tomorrow Other:  _________ Dose:  1 g Take 1 g by mouth Before breakfast, lunch, and dinner. Refills:  0  
     
   
   
   
  
 sulindac 200 mg tablet Commonly known as:  CLINORIL Your next dose is: Today, Tomorrow Other:  _________ Dose:  200 mg Take 200 mg by mouth two (2) times a day. Refills:  0  
     
   
   
   
  
 therapeutic multivitamin tablet Commonly known as:  [facility] Your next dose is: Today, Tomorrow Other:  _________ Dose:  1 Tab Take 1 Tab by mouth daily. Refills:  0  
     
   
   
   
  
 VITAMIN D3 2,000 unit Tab Generic drug:  cholecalciferol (vitamin D3) Your next dose is: Today, Tomorrow Other:  _________ Dose:  2000 Units Take 2,000 Units by mouth daily. Refills:  0 Where to Get Your Medications Information on where to get these meds will be given to you by the nurse or doctor. ! Ask your nurse or doctor about these medications  
  diazePAM 5 mg tablet  
 oxyCODONE-acetaminophen 5-325 mg per tablet Discharge Instructions CT Myelogram is scheduled for Tuesday, February 14 Follow up with Dr. Deena Lorenzo in his office on Thursday February 16 Discharge Orders None Introducing [facility]! Dear Shayna Coulter: 
Thank you for requesting a [employer] account. Our records indicate that you already have an active [employer] account. You can access your account anytime at [email or web address] Did you know that you can access your hospital and ER discharge instructions at any time in [employer]? You can also review all of your test results from your hospital stay or ER visit. Additional Information If you have questions, please visit the Frequently Asked Questions section of the MyChart website at [email or web address] Remember, MyChart is NOT to be used for urgent needs. For medical emergencies, dial 911. Now available from your iPhone and Android! General Information Please provide this summary of care documentation to your next provider. Patient Signature:  ____________________________________________________________ Date:  ____________________________________________________________  
  
Renata Barlow Provider Signature:  ____________________________________________________________ Date:  ____________________________________________________________

## 2017-02-08 NOTE — IP AVS SNAPSHOT
Current Discharge Medication List  
  
Take these medications at their scheduled times Dose & Instructions Dispensing Information Comments Morning Noon Evening Bedtime ADVAIR DISKUS 250-50 mcg/dose diskus inhaler Generic drug:  fluticasone-salmeterol Your next dose is: Today, Tomorrow Other:  ____________ Dose:  1 Puff Take 1 Puff by inhalation every twelve (12) hours. Refills:  0  
     
   
   
   
  
 amLODIPine 5 mg tablet Commonly known as:  Horris Bills Your next dose is: Today, Tomorrow Other:  ____________ Dose:  5 mg Take 5 mg by mouth every evening. Refills:  0  
     
   
   
   
  
 aspirin delayed-release 81 mg tablet Your next dose is: Today, Tomorrow Other:  ____________ Dose:  81 mg Take 81 mg by mouth daily. Refills:  0  
     
   
   
   
  
 BYSTOLIC 10 mg tablet Generic drug:  nebivolol Your next dose is: Today, Tomorrow Other:  ____________ Dose:  10 mg Take 10 mg by mouth daily. Refills:  0  
     
   
   
   
  
 cyanocobalamin 1,000 mcg tablet Your next dose is: Today, Tomorrow Other:  ____________ Dose:  1000 mcg Take 1,000 mcg by mouth daily. Refills:  0 Estradiol 0.5 mg/0.5 gram (0.1 %) Glpk Commonly known as:  Vertie Serve Your next dose is: Today, Tomorrow Other:  ____________ Dose:  1 Packet 1 Packet by TransDERmal route daily. Apply 1 packet to thigh daily Quantity:  90 Packet Refills:  3  
     
   
   
   
  
 folic acid 1 mg tablet Commonly known as:  Google Your next dose is: Today, Tomorrow Other:  ____________ Dose:  2 mg Take 2 mg by mouth daily. Refills:  0  
     
   
   
   
  
 gabapentin 100 mg capsule Commonly known as:  NEURONTIN Your next dose is: Today, Tomorrow Other:  ____________ Dose:  100 mg Take 100 mg by mouth two (2) times a day. Refills:  0  
     
   
   
   
  
 lisinopril 20 mg tablet Commonly known as:  Arnold Files Your next dose is: Today, Tomorrow Other:  ____________ Dose:  20 mg Take 20 mg by mouth daily. Refills:  0 LOPID 600 mg tablet Generic drug:  gemfibrozil Your next dose is: Today, Tomorrow Other:  ____________ Dose:  600 mg Take 600 mg by mouth Before breakfast and dinner. Refills:  0  
     
   
   
   
  
 methotrexate 2.5 mg tablet Commonly known as:  Pedro Crane Your next dose is: Today, Tomorrow Other:  ____________ Dose:  17.5 mg Take 17.5 mg by mouth Every Friday. Refills:  0 NexIUM 40 mg capsule Generic drug:  esomeprazole Your next dose is: Today, Tomorrow Other:  ____________ Dose:  40 mg Take 40 mg by mouth Before breakfast and dinner. Refills:  0  
     
   
   
   
  
 PLAQUENIL 200 mg tablet Generic drug:  hydroxychloroquine Your next dose is: Today, Tomorrow Other:  ____________ Dose:  200 mg Take 200 mg by mouth two (2) times a day. Refills:  0  
     
   
   
   
  
 pyridoxine (vitamin B6) 100 mg tablet Commonly known as:  VITAMIN B-6 Your next dose is: Today, Tomorrow Other:  ____________ Dose:  100 mg Take 100 mg by mouth daily. Refills:  0  
     
   
   
   
  
 RESTASIS 0.05 % ophthalmic emulsion Generic drug:  cycloSPORINE Your next dose is: Today, Tomorrow Other:  ____________ Dose:  1 Drop Administer 1 Drop to both eyes two (2) times a day. Refills:  0 SAVELLA 50 mg tablet Generic drug:  Milnacipran Your next dose is: Today, Tomorrow Other:  ____________ Dose:  50 mg Take 50 mg by mouth two (2) times a day. Refills:  0  
     
   
   
   
  
 sodium chloride 1 gram tablet Your next dose is: Today, Tomorrow Other:  ____________ Dose:  1 g Take 1 g by mouth daily. Refills:  0  
     
   
   
   
  
 spironolactone 25 mg tablet Commonly known as:  ALDACTONE Your next dose is: Today, Tomorrow Other:  ____________ Dose:  25 mg Take 25 mg by mouth daily. Refills:  0  
     
   
   
   
  
 sucralfate 1 gram tablet Commonly known as:  Artur Avers Your next dose is: Today, Tomorrow Other:  ____________ Dose:  1 g Take 1 g by mouth Before breakfast, lunch, and dinner. Refills:  0  
     
   
   
   
  
 sulindac 200 mg tablet Commonly known as:  CLINORIL Your next dose is: Today, Tomorrow Other:  ____________ Dose:  200 mg Take 200 mg by mouth two (2) times a day. Refills:  0  
     
   
   
   
  
 therapeutic multivitamin tablet Commonly known as:  [facility] Your next dose is: Today, Tomorrow Other:  ____________ Dose:  1 Tab Take 1 Tab by mouth daily. Refills:  0  
     
   
   
   
  
 VITAMIN D3 2,000 unit Tab Generic drug:  cholecalciferol (vitamin D3) Your next dose is: Today, Tomorrow Other:  ____________ Dose:  2000 Units Take 2,000 Units by mouth daily. Refills:  0 Take these medications as needed Dose & Instructions Dispensing Information Comments Morning Noon Evening Bedtime  
 albuterol 90 mcg/actuation inhaler Commonly known as:  PROVENTIL HFA, VENTOLIN HFA, PROAIR HFA Your next dose is: Today, Tomorrow Other:  ____________ Dose:  2 Puff Take 2 Puffs by inhalation every six (6) hours as needed for Wheezing. Refills:  0  
     
   
   
   
  
 diazePAM 5 mg tablet Commonly known as:  VALIUM Your next dose is: Today, Tomorrow Other:  ____________ Dose:  5 mg Take 1 Tab by mouth three (3) times daily as needed (spasm). Max Daily Amount: 15 mg. Quantity:  40 Tab Refills:  0  
     
   
   
   
  
 oxyCODONE-acetaminophen 5-325 mg per tablet Commonly known as:  PERCOCET Your next dose is: Today, Tomorrow Other:  ____________ Dose:  1-2 Tab Take 1-2 Tabs by mouth every four (4) hours as needed for Pain. Max Daily Amount: 12 Tabs. Quantity:  60 Tab Refills:  0 Take these medications as directed Dose & Instructions Dispensing Information Comments Morning Noon Evening Bedtime  
 lidocaine 5 % Commonly known as:  Glenda Leonard Your next dose is: Today, Tomorrow Other:  ____________ Apply patch to the affected area for 12 hours a day as needed for pain and remove for 12 hours a day. Quantity:  15 Each Refills:  0  
     
   
   
   
  
 predniSONE 10 mg dose pack Commonly known as:  STERAPRED DS Your next dose is: Today, Tomorrow Other:  ____________ Take according to dose pack instructions. Quantity:  21 Tab Refills:  0 Where to Get Your Medications Information about where to get these medications is not yet available ! Ask your nurse or doctor about these medications  
  diazePAM 5 mg tablet  
 oxyCODONE-acetaminophen 5-325 mg per tablet

## 2017-02-09 VITALS
OXYGEN SATURATION: 98 % | RESPIRATION RATE: 18 BRPM | BODY MASS INDEX: 28.32 KG/M2 | TEMPERATURE: 98.7 F | WEIGHT: 170 LBS | DIASTOLIC BLOOD PRESSURE: 69 MMHG | SYSTOLIC BLOOD PRESSURE: 144 MMHG | HEIGHT: 65 IN | HEART RATE: 77 BPM

## 2017-02-09 PROCEDURE — 97116 GAIT TRAINING THERAPY: CPT

## 2017-02-09 PROCEDURE — G8988 SELF CARE GOAL STATUS: HCPCS

## 2017-02-09 PROCEDURE — 97161 PT EVAL LOW COMPLEX 20 MIN: CPT

## 2017-02-09 PROCEDURE — 51798 US URINE CAPACITY MEASURE: CPT

## 2017-02-09 PROCEDURE — G8978 MOBILITY CURRENT STATUS: HCPCS

## 2017-02-09 PROCEDURE — G8980 MOBILITY D/C STATUS: HCPCS

## 2017-02-09 PROCEDURE — G8987 SELF CARE CURRENT STATUS: HCPCS

## 2017-02-09 PROCEDURE — 97530 THERAPEUTIC ACTIVITIES: CPT

## 2017-02-09 PROCEDURE — G8989 SELF CARE D/C STATUS: HCPCS

## 2017-02-09 PROCEDURE — 96361 HYDRATE IV INFUSION ADD-ON: CPT

## 2017-02-09 PROCEDURE — 74011000250 HC RX REV CODE- 250: Performed by: PHYSICIAN ASSISTANT

## 2017-02-09 PROCEDURE — 96376 TX/PRO/DX INJ SAME DRUG ADON: CPT

## 2017-02-09 PROCEDURE — 97535 SELF CARE MNGMENT TRAINING: CPT

## 2017-02-09 PROCEDURE — 97166 OT EVAL MOD COMPLEX 45 MIN: CPT

## 2017-02-09 PROCEDURE — 74011250636 HC RX REV CODE- 250/636: Performed by: PHYSICIAN ASSISTANT

## 2017-02-09 PROCEDURE — 74011250637 HC RX REV CODE- 250/637: Performed by: PHYSICIAN ASSISTANT

## 2017-02-09 PROCEDURE — 99218 HC RM OBSERVATION: CPT

## 2017-02-09 PROCEDURE — G8979 MOBILITY GOAL STATUS: HCPCS

## 2017-02-09 RX ORDER — DIAZEPAM 5 MG/1
5 TABLET ORAL
Qty: 40 TAB | Refills: 0 | Status: SHIPPED | OUTPATIENT
Start: 2017-02-09 | End: 2018-10-09

## 2017-02-09 RX ORDER — OXYCODONE AND ACETAMINOPHEN 5; 325 MG/1; MG/1
1-2 TABLET ORAL
Qty: 60 TAB | Refills: 0 | Status: SHIPPED | OUTPATIENT
Start: 2017-02-09 | End: 2018-10-09

## 2017-02-09 RX ADMIN — SPIRONOLACTONE 25 MG: 25 TABLET ORAL at 09:37

## 2017-02-09 RX ADMIN — SUCRALFATE 1 G: 1 TABLET ORAL at 11:38

## 2017-02-09 RX ADMIN — GABAPENTIN 100 MG: 100 CAPSULE ORAL at 09:38

## 2017-02-09 RX ADMIN — HYDROMORPHONE HYDROCHLORIDE 1 MG: 1 INJECTION, SOLUTION INTRAMUSCULAR; INTRAVENOUS; SUBCUTANEOUS at 04:37

## 2017-02-09 RX ADMIN — OXYCODONE HYDROCHLORIDE AND ACETAMINOPHEN 1 TABLET: 10; 325 TABLET ORAL at 15:23

## 2017-02-09 RX ADMIN — MILNACIPRAN HYDROCHLORIDE 50 MG: 50 TABLET, FILM COATED ORAL at 10:49

## 2017-02-09 RX ADMIN — NEBIVOLOL HYDROCHLORIDE 10 MG: 5 TABLET ORAL at 09:38

## 2017-02-09 RX ADMIN — FOLIC ACID 2 MG: 1 TABLET ORAL at 09:38

## 2017-02-09 RX ADMIN — VITAMIN D, TAB 1000IU (100/BT) 2000 UNITS: 25 TAB at 09:38

## 2017-02-09 RX ADMIN — OXYCODONE HYDROCHLORIDE AND ACETAMINOPHEN 1 TABLET: 10; 325 TABLET ORAL at 01:52

## 2017-02-09 RX ADMIN — FLUTICASONE FUROATE AND VILANTEROL TRIFENATATE 1 PUFF: 100; 25 POWDER RESPIRATORY (INHALATION) at 10:49

## 2017-02-09 RX ADMIN — CYCLOSPORINE 1 DROP: 0.5 EMULSION OPHTHALMIC at 10:49

## 2017-02-09 RX ADMIN — Medication 1000 MCG: at 09:38

## 2017-02-09 RX ADMIN — DEXAMETHASONE SODIUM PHOSPHATE 10 MG: 4 INJECTION, SOLUTION INTRAMUSCULAR; INTRAVENOUS at 06:11

## 2017-02-09 RX ADMIN — SODIUM CHLORIDE TAB 1 GM 1 G: 1 TAB at 09:38

## 2017-02-09 RX ADMIN — DEXAMETHASONE SODIUM PHOSPHATE 10 MG: 4 INJECTION, SOLUTION INTRAMUSCULAR; INTRAVENOUS at 14:28

## 2017-02-09 RX ADMIN — GEMFIBROZIL 600 MG: 600 TABLET ORAL at 06:13

## 2017-02-09 RX ADMIN — PANTOPRAZOLE SODIUM 40 MG: 40 TABLET, DELAYED RELEASE ORAL at 06:13

## 2017-02-09 RX ADMIN — SUCRALFATE 1 G: 1 TABLET ORAL at 06:13

## 2017-02-09 RX ADMIN — LISINOPRIL 20 MG: 20 TABLET ORAL at 09:39

## 2017-02-09 RX ADMIN — DIAZEPAM 5 MG: 5 TABLET ORAL at 11:38

## 2017-02-09 RX ADMIN — Medication 10 ML: at 06:25

## 2017-02-09 RX ADMIN — HYDROXYCHLOROQUINE SULFATE 200 MG: 200 TABLET ORAL at 09:38

## 2017-02-09 RX ADMIN — SODIUM CHLORIDE 100 ML/HR: 900 INJECTION, SOLUTION INTRAVENOUS at 06:20

## 2017-02-09 RX ADMIN — Medication 10 ML: at 13:03

## 2017-02-09 RX ADMIN — OXYCODONE HYDROCHLORIDE AND ACETAMINOPHEN 1 TABLET: 10; 325 TABLET ORAL at 09:37

## 2017-02-09 RX ADMIN — Medication 100 MG: at 09:38

## 2017-02-09 NOTE — DISCHARGE INSTRUCTIONS
CT Myelogram is scheduled for Tuesday, February 14  Follow up with Dr. Yris Bauman in his office on Thursday February 16

## 2017-02-09 NOTE — PROGRESS NOTES
Pt is unable to have MRIs performed due to broken wire of implanted spinal fusion stimulator. Spoke w/ Radiologist, Dr. Elisha Barnett.  Radiologist recommends ordering CT scans of the spine since MRIs cannot be performed    WILL Maddox

## 2017-02-09 NOTE — PROGRESS NOTES
[facility]: MED RECONCILIATION    Comments: Interviewed patient. Prednisone dosepak : Almost finished, may be a day left to finish. Estradiol (Divigel) : Non-Formulary. Please consider to hold while patient is here. Allergies: Penicillins    Prior to Admission Medications   Prescriptions Last Dose Informant Patient Reported? Taking? Estradiol (DIVIGEL) 0.5 mg (0.1 %) glpk 2017 Self No Yes   Si Packet by TransDERmal route daily. Apply 1 packet to thigh daily   Milnacipran (SAVELLA) 50 mg tablet 2017 at AM Self Yes Yes   Sig: Take 50 mg by mouth two (2) times a day. albuterol (PROVENTIL HFA, VENTOLIN HFA, PROAIR HFA) 90 mcg/actuation inhaler  Self Yes Yes   Sig: Take 2 Puffs by inhalation every six (6) hours as needed for Wheezing. amLODIPine (NORVASC) 5 mg tablet 2017 Self Yes Yes   Sig: Take 5 mg by mouth every evening. aspirin delayed-release 81 mg tablet 2017 Self Yes Yes   Sig: Take 81 mg by mouth daily. cholecalciferol, vitamin D3, (VITAMIN D3) 2,000 unit tab 2017 Self Yes Yes   Sig: Take 2,000 Units by mouth daily. cyanocobalamin 1,000 mcg tablet 2017 Self Yes Yes   Sig: Take 1,000 mcg by mouth daily. cycloSPORINE (RESTASIS) 0.05 % ophthalmic emulsion 2017 at AM Self Yes Yes   Sig: Administer 1 Drop to both eyes two (2) times a day. diazePAM (VALIUM) 5 mg tablet  Self No Yes   Sig: Take 1 Tab by mouth three (3) times daily as needed (spasm). Max Daily Amount: 15 mg.   esomeprazole (NEXIUM) 40 mg capsule 2017 at AM Self Yes Yes   Sig: Take 40 mg by mouth Before breakfast and dinner. fluticasone-salmeterol (ADVAIR DISKUS) 250-50 mcg/dose diskus inhaler 2017 at AM Self Yes Yes   Sig: Take 1 Puff by inhalation every twelve (12) hours. folic acid (FOLVITE) 1 mg tablet 2017 Self Yes Yes   Sig: Take 2 mg by mouth daily. gabapentin (NEURONTIN) 100 mg capsule 2017 at AM Self Yes Yes   Sig: Take 100 mg by mouth two (2) times a day.    gemfibrozil (LOPID) 600 mg tablet 2/8/2017 at AM Self Yes Yes   Sig: Take 600 mg by mouth Before breakfast and dinner. hydroxychloroquine (PLAQUENIL) 200 mg tablet 2/8/2017 at AM Self Yes Yes   Sig: Take 200 mg by mouth two (2) times a day. lidocaine (LIDODERM) 5 % 2/8/2017 at AM Self No Yes   Sig: Apply patch to the affected area for 12 hours a day as needed for pain and remove for 12 hours a day. lisinopril (PRINIVIL, ZESTRIL) 20 mg tablet 2/8/2017 Self Yes Yes   Sig: Take 20 mg by mouth daily. methotrexate (RHEUMATREX) 2.5 mg tablet 2/3/2017 Self Yes Yes   Sig: Take 17.5 mg by mouth Every Friday. nebivolol (BYSTOLIC) 10 mg tablet 2/5/8451 Self Yes Yes   Sig: Take 10 mg by mouth daily. oxyCODONE-acetaminophen (PERCOCET) 5-325 mg per tablet  Self No Yes   Sig: Take 1 Tab by mouth every six (6) hours as needed for Pain. Max Daily Amount: 4 Tabs. predniSONE (STERAPRED DS) 10 mg dose pack Almost dose (may be 1 day left) Self No Yes   Sig: Take according to dose pack instructions. pyridoxine, vitamin B6, (VITAMIN B-6) 100 mg tablet 2/8/2017 Self Yes Yes   Sig: Take 100 mg by mouth daily. sodium chloride 1 gram tablet 2/8/2017 Self Yes Yes   Sig: Take 1 g by mouth daily. spironolactone (ALDACTONE) 25 mg tablet 2/8/2017 Self Yes Yes   Sig: Take 25 mg by mouth daily. sucralfate (CARAFATE) 1 gram tablet 2/8/2017 at AM Self Yes Yes   Sig: Take 1 g by mouth Before breakfast, lunch, and dinner. sulindac (CLINORIL) 200 mg tablet 2/8/2017 at AM Self Yes Yes   Sig: Take 200 mg by mouth two (2) times a day. therapeutic multivitamin [facility]) tablet 2/8/2017 Self Yes Yes   Sig: Take 1 Tab by mouth daily.                [address] [facility]   Contact: [phone]

## 2017-02-09 NOTE — PROGRESS NOTES
2/9/2017 10:17 AM Met with pt. Charted address and phone numbers confirmed. Obs letter given and explained. Pt lives with her spouse in a 1 story home in [place] [place] [facility], there are 4 steps to enter. Pt has had HH in the past but was unable to recall the name of the agency. Pt uses a cane to assist with ambulation, was independent with adls and driving prior to admission. Pt has rx coverage and fills her scripts at [facility]. Pt's spouse will transport pt home. No discharge needs identified at this time. CM will follow. [facility]  Care Management Interventions  PCP Verified by CM: Yes Ingrid Miles)  Mode of Transport at Discharge: Other (see comment) (Pt's wife )  MyChart Signup: No  Discharge Durable Medical Equipment: No (Pt owns a RW and cane )  Physical Therapy Consult: Yes  Occupational Therapy Consult: Yes  Speech Therapy Consult: No  Current Support Network: Lives with Spouse, Own Home  Confirm Follow Up Transport: Self  Plan discussed with Pt/Family/Caregiver:  Yes

## 2017-02-09 NOTE — PROGRESS NOTES
Discharge instructions reviewed with patient and her wife. Opportunity for questions provided. Two prescriptions given with administration instructions. Pt agreeable to discharge home. Aware of upcoming appointments and related instructions. PIV removed prior to discharge home.

## 2017-02-09 NOTE — PROGRESS NOTES
Occupational Therapy EVALUATION/discharge  Patient: Natty Mayfield (85 y.o. female)  Date: 2/9/2017  Primary Diagnosis: Intractable back pain        Precautions:   Fall, Back    ASSESSMENT:   Based on the objective data described below, the patient presents with admission through the ER for intractable back pain, now with steroids and pain medication feeling significantly less pain. Patient has PMH of multiple back and cervical surgeries, demonstrates good safety awareness and insight into appropriate precautions for mobility with back pain. Patient has had multiple falls recently, describes radiating pain with BLE. Patient completes bed mobility using log roll technique, states difficulty with bed mobility at home and has been sleeping in a chair. Patient transfers supine to sit to stand independently, <> bathroom, <> commode independently. Patient completes clothing management and personal hygiene at baseline independent. Patient completes UE and LE dressing with setup. Patient is safe for discharge home, has appropriate DME as needed at home and is scheduled for further OP testing and follow up with ortho next week. .  Skilled acute occupational therapy is completed at this time. Discharge Recommendations: None  Further Equipment Recommendations for Discharge: none      SUBJECTIVE:   Patient stated I am probably going to be back for surgery.     OBJECTIVE DATA SUMMARY:   HISTORY:   Past Medical History   Diagnosis Date    Asthma      activity induced asthma     Autoimmune disease ([address]      mixed connective tissue disease    Cancer ([facility] [address]      skin-basal    Chronic pain      lower back, joints    DDD (degenerative disc disease), lumbar     DJD (degenerative joint disease)     ETOH abuse      Sober 12-13-11    GERD (gastroesophageal reflux disease)     Hernia     Hx of bronchitis     Hx of mammogram 2/18/15     Found a 1.2cm mass = cyst, benign    Hx of seizure disorder 2010     unknown cause, none since    Hx of sinusitis     Hyperlipemia     Hypertension     Hypertriglyceridemia     Hyponatremia     Insomnia     Lupus (HCC)     Murmur, cardiac     Neuropathic pain     Other bursitis of hip, right hip 09/14/2016     Had a deep hip injection for pain     Routine Papanicolaou smear 09/20/2016     Negative (no hpv)     Stroke ([facility] [address] 3/11     PCP states she had a TIA - patient denies this (was low Na)    Tobacco abuse      Stopped in 2012     Past Surgical History   Procedure Laterality Date    Hx tonsil and adenoidectomy  1962    Hx lumbar laminectomy       rods in 1996, out 1998    Hx lumbar fusion       10/8/2012    Hx lap cholecystectomy  3/11/2014    Hx hernia repair  10/7/2014     incisional with mesh    Hx other surgical  10/08/2012     [facility] Bone Growth Stimulator    Hx total abdominal hysterectomy  12/20/1992     fibroids; Dr. Maria E Hendricks Hx cyst incision and drainage Left 08/2016    Hx orthopaedic  1996, 1998     back surgery    Hx orthopaedic Bilateral 2013 R, 2014 L     carpal tunnel    Hx orthopaedic  6/27/2013     right knee arthroscopy    Hx orthopaedic Right 3/2015     trigger thumb release    Hx orthopaedic  10/8/2012     ALIF/ PLIF with bone stimulator    Hx orthopaedic  10/23/2012     Back surgery to adjust hardware       Prior Level of Function/Home Situation: I with ADLS and IADLS, retired from [employer] and describes active lifestyle  Expanded or extensive additional review of patient history:     Home Situation  Home Environment: Private residence  # Steps to Enter: 4  Rails to Enter: Yes  Hand Rails : Bilateral (widespread)  One/Two Story Residence: Two story, live on 1st floor  Interior Rails: Both  Lift Chair Available: No  Living Alone: No  Support Systems: Spouse/Significant Other/Partner  Patient Expects to be Discharged to[de-identified] Private residence  Current DME Used/Available at Home: [employer] aides, Cane, straight, Walker, rolling  Tub or Shower Type: Shower  [x] Right hand dominant   []  Left hand dominant    EXAMINATION OF PERFORMANCE DEFICITS:  Cognitive/Behavioral Status:  Neurologic State: Alert  Orientation Level: Oriented X4  Cognition: Appropriate decision making; Appropriate for age attention/concentration; Appropriate safety awareness  Perception: Appears intact  Perseveration: No perseveration noted  Safety/Judgement: Awareness of environment; Insight into deficits  Skin: appears intact UE  Edema: none noted UE  Vision/Perceptual:                      Diplopia: No    Acuity: Within Defined Limits;Able to read clock/calendar on wall without difficulty    Corrective Lenses: Glasses  Range of Motion:    AROM: Within functional limits (BUE)                       Strength:    Strength: Within functional limits (BUE)              Coordination:  Coordination: Within functional limits (BUE)  Fine Motor Skills-Upper: Left Intact; Right Intact    Gross Motor Skills-Upper: Left Intact; Right Intact  Tone & Sensation:    Tone: Normal (BUE)  Sensation: Intact (BUE)                      Balance:  Sitting: Intact; Without support  Standing: Intact; Without support    Functional Mobility and Transfers for ADLs:  Bed Mobility:  Rolling: Modified independent  Supine to Sit: Modified independent  Scooting: Independent    Transfers:  Sit to Stand: Independent;Supervision  Stand to Sit: Independent  Bed to Chair: Independent  Toilet Transfer : Modified independent (use of grab bar)    ADL Assessment:  Feeding: Setup    Oral Facial Hygiene/Grooming: Setup (in standing at sink)    Bathing: Independent    Upper Body Dressing: Independent    Lower Body Dressing: Independent    Toileting: Modified independent                ADL Intervention and task modifications:    Patient instructed and indicated understanding the benefits of maintaining activity tolerance, functional mobility, and independence with self care tasks during acute stay to ensure safe return home and to baseline.  Encouraged patient to increase frequency and duration OOB, be out of bed for all meals, perform daily ADLs (as approved by RN/MD regarding bathing etc), and performing functional mobility to/from bathroom with assistance. Cognitive Retraining  Safety/Judgement: Awareness of environment; Insight into deficits      Functional Measure:  Barthel Index:    Bathin  Bladder: 10  Bowels: 10  Groomin  Dressing: 10  Feeding: 10  Mobility: 10  Stairs: 5  Toilet Use: 10  Transfer (Bed to Chair and Back): 15  Total: 90       Barthel and G-code impairment scale:  Percentage of impairment CH  0% CI  1-19% CJ  20-39% CK  40-59% CL  60-79% CM  80-99% CN  100%   Barthel Score 0-100 100 99-80 79-60 59-40 20-39 1-19   0   Barthel Score 0-20 20 17-19 13-16 9-12 5-8 1-4 0      The Barthel ADL Index: Guidelines  1. The index should be used as a record of what a patient does, not as a record of what a patient could do. 2. The main aim is to establish degree of independence from any help, physical or verbal, however minor and for whatever reason. 3. The need for supervision renders the patient not independent. 4. A patient's performance should be established using the best available evidence. Asking the patient, friends/relatives and nurses are the usual sources, but direct observation and common sense are also important. However direct testing is not needed. 5. Usually the patient's performance over the preceding 24-48 hours is important, but occasionally longer periods will be relevant. 6. Middle categories imply that the patient supplies over 50 per cent of the effort. 7. Use of aids to be independent is allowed. Twila Atkins., Barthel, D.W. ([id number]). Functional evaluation: the Barthel Index. [address] [facility] (14)2. BRANDO Rick, Dank Chavira., Vicente Olguin, [place], [address] [place] [address] ().  Measuring the change indisability after inpatient rehabilitation; comparison of the responsiveness of the Barthel Index and Functional Morrill Measure. Journal of Neurology, Neurosurgery, and Psychiatry, 66(4), 841-158. SRIDEVI Steele.EZEQUIEL, JOSHUA Buckley, & Giancarlo Cunningham M.A. (2004.) Assessment of post-stroke quality of life in cost-effectiveness studies: The usefulness of the Barthel Index and the EuroQoL-5D. Quality of Life Research, 13, 442-71       G codes: In compliance with CMSs Claims Based Outcome Reporting, the following G-code set was chosen for this patient based on their primary functional limitation being treated: The outcome measure chosen to determine the severity of the functional limitation was the Barthel with a score of 90/100 which was correlated with the impairment scale. ? Other PT/OT Primary Functional Limitations:     - CURRENT STATUS: CI - 1%-19% impaired, limited or restricted    - GOAL STATUS: CI - 1%-19% impaired, limited or restricted    - D/C STATUS:  CI - 1%-19% impaired, limited or restricted     Occupational Therapy Evaluation Charge Determination   History Examination Decision-Making   MEDIUM Complexity : Expanded review of history including physical, cognitive and psychosocial  history  MEDIUM Complexity : 3-5 performance deficits relating to physical, cognitive , or psychosocial skils that result in activity limitations and / or participation restrictions MEDIUM Complexity : Patient may present with comorbidities that affect occupational performnce.  Miniml to moderate modification of tasks or assistance (eg, physical or verbal ) with assesment(s) is necessary to enable patient to complete evaluation       Based on the above components, the patient evaluation is determined to be of the following complexity level: MEDIUM  Pain:  Pain Scale 1: Numeric (0 - 10)  Pain Intensity 1: 5  Pain Location 1: Back  Pain Orientation 1: Lower  Pain Description 1: Aching  Pain Intervention(s) 1: Medication (see MAR)  Activity Tolerance:   Patient completed multiple sit to stand transfers, mobility around room and bathroom   in completion of ADLS with no LOB or break  Please refer to the flowsheet for vital signs taken during this treatment. After treatment:   [x]  Patient left in no apparent distress sitting up in chair  []  Patient left in no apparent distress in bed  [x]  Call bell left within reach  [x]  Nursing notified  [x]  Caregiver present  []  Bed alarm activated    COMMUNICATION/EDUCATION:   Communication/Collaboration:  [x]      Home safety education was provided and the patient/caregiver indicated understanding. [x]      Patient/family have participated as able and agree with findings and recommendations. []      Patient is unable to participate in plan of care at this time.   Findings and recommendations were discussed with: Physical Therapist, Registered Nurse and     Ritchie Olson OT  Time Calculation: 35 mins

## 2017-02-09 NOTE — PROGRESS NOTES
ORTHOPAEDIC SPINE SURGERY PROGRESS NOTE    NAME:     Praful Nicolas   :       1960   MRN:       [id number]   DATE:      2017     SUBJECTIVE:  Pt is feeling better this morning after pain medication and steroids  Radiating pain down both legs w/ associated numbness/tingling has decreased  She has not had any urinary retention. She is controlling her bladder, no incontinence  She still denies any saddle anesthesia. She has not had any significant post void residual on bladder scan per RN    Cervical spine CT showed: No fracture or dislocation. No significant evidence of cord compression    Thoracic spine CT showed: No fracture or dislocation. No evidence of cord compression. No osseous canal compromise. Lumbar spine CT showed: Large broad-based disc protrusion/osteophyte at L2-L3. There is a severe canal  stenosis at L2-L3 and posterior to the superior aspect of the L3 vertebral body  as well.    The conus medullaris terminates superior to this region as such no cord  compression is present. Her labs were ok, suspect elevated WBC count to be due to recent PO/IV steroids  Pt does not have any s/s of infx    Recent Labs      17   1912  17   1846   HGB   --   10.8*   HCT   --   32.6*   INR  1.1   --    NA   --   133*   K   --   4.5   CL   --   99   CO2   --   23   BUN   --   26*   CREA   --   0.97   GLU   --   103*     Patient Vitals for the past 12 hrs:   BP Temp Pulse Resp SpO2   17 1203 132/69 97.7 °F (36.5 °C) 78 18 97 %   17 0911 174/90 98 °F (36.7 °C) 82 17 98 %   17 0421 147/82 97.5 °F (36.4 °C) 78 18 98 %       EXAM:  Patient is pleasant on exam. No distress. Mood and affect are appropriate.      Cervical Exam: Good range of motion. No tenderness. No midline step-off, crepitus or deformity. Normal range of motion in bilateral upper extremities. 5/5 strength in bilateral upper extremities. Bilateral upper extremities are neurovascularly intact distally.   Negative Dial's sign   Reflex, motor and sensory exams are non-focal/unchanged      Thoracic exam: Range of motion improved. No tenderness. No midline step-off, deformity or crepitus.      Lumbar exam: Range of motion improved. No tenderness. No midline step-off, deformity or crepitus. Normal range of motion in bilateral lower extremities. 4+ to 5/5 strength with hip flexion bilaterally, otherwise 5/5 strength in bilateral lower extremities   Negative L SLR test   Reflex, motor and sensory exams are non-focal/unchanged     Bilateral lower extremities are neurovascularly intact distally  No clubbing, cyanosis or edema        PLAN:   PT/OT  Patient can resume her regular diet  Continue pain medication(s)    Patient has not had any bowel incontinence overnight. No urinary retention on post void bladder scans as discussed with RN. Pt has good rectal tone/voluntary control of her sphincter on rectal exam (my exam and exam in the ED). Pt is not having any urinary incontinence. This is not consistent with cauda equina syndrome. She will need surgery due to her severe stenosis but emergent surgery is not indicated at this time. CT scan results were discussed with Dr. Cristal Britt who agrees with assessment and plan. We will get her scheduled for a CT myelogram of her lumbar spine ASAP. She will then promptly follow up in the office with Dr. Cristal Britt for surgical planning.  We will set her up for surgery as soon as possible    Will likely D/C home today if cleared by PT  [facility] PA-C  Orthopaedic Surgery  Physician Assistant to Dr. Theresa Wilson

## 2017-02-09 NOTE — ED NOTES
TRANSFER - OUT REPORT:    Verbal report given to [id number] [facility] RN(name) on Nestor Soria  being transferred to [address] [place] [address](unit) for routine progression of care       Report consisted of patients Situation, Background, Assessment and   Recommendations(SBAR). Information from the following report(s) SBAR, Kardex, ED Summary, Procedure Summary, Intake/Output and Recent Results was reviewed with the receiving nurse. Lines:   Peripheral IV 02/08/17 Left Forearm (Active)   Site Assessment Clean, dry, & intact 2/8/2017  6:45 PM   Phlebitis Assessment 0 2/8/2017  6:45 PM   Infiltration Assessment 0 2/8/2017  6:45 PM   Dressing Status Clean, dry, & intact 2/8/2017  6:45 PM   Dressing Type Tape;Transparent 2/8/2017  6:45 PM   Hub Color/Line Status Blue;Flushed;Patent 2/8/2017  6:45 PM   Action Taken Blood drawn 2/8/2017  6:45 PM        Opportunity for questions and clarification was provided.       Patient transported with:   [facility]

## 2017-02-09 NOTE — PROGRESS NOTES
physical Therapy EVALUATION/DISCHARGE  Patient: Tuan Morataya (10 y.o. female)  Date: 2/9/2017  Primary Diagnosis: Intractable back pain        Precautions:   Fall, Back  ASSESSMENT :  Based on the objective data described below, the patient presents with generalized LE weakness (LLE slightly weaker than RLE), decreased dynamic balance, and moderate risk for falls following admission for back pain. She has been treated with steroids and pain medication and pain currently improved at 5/10. Patient has extensive history of back surgeries and also reports multiple falls lately. CT of spine reveals large disc protrusion at L2-L3, and severe stenosis. Patient also has spinal stimulator with broken wire. Today she was able to stand and ambulate 100 feet with rolling walker and CGA. She also went up and down 4 steps with rail and cane, CGA. Patient states she will be having another spinal surgery next week. At this time, she is safe for discharge but should use her rolling walker at all times and have close supervision for safety. Skilled physical therapy is not indicated at this time. PLAN :  Discharge Recommendations: None  Further Equipment Recommendations for Discharge: none     SUBJECTIVE:   Patient stated I feel so much better.     OBJECTIVE DATA SUMMARY:   HISTORY:    Past Medical History   Diagnosis Date    Asthma      activity induced asthma     Autoimmune disease ([facility] [address]      mixed connective tissue disease    Cancer ([facility] [address]      skin-basal    Chronic pain      lower back, joints    DDD (degenerative disc disease), lumbar     DJD (degenerative joint disease)     ETOH abuse      Sober 12-13-11    GERD (gastroesophageal reflux disease)     Hernia     Hx of bronchitis     Hx of mammogram 2/18/15     Found a 1.2cm mass = cyst, benign    Hx of seizure disorder 2010     unknown cause, none since    Hx of sinusitis     Hyperlipemia     Hypertension     Hypertriglyceridemia     Hyponatremia     Insomnia     Lupus ([facility] [address]     Murmur, cardiac     Neuropathic pain     Other bursitis of hip, right hip 09/14/2016     Had a deep hip injection for pain     Routine Papanicolaou smear 09/20/2016     Negative (no hpv)     Stroke ([facility] [address] 3/11     PCP states she had a TIA - patient denies this (was low Na)    Tobacco abuse      Stopped in 2012     Past Surgical History   Procedure Laterality Date    Hx tonsil and adenoidectomy  1962    Hx lumbar laminectomy       rods in 1996, out 1998    Hx lumbar fusion       10/8/2012    Hx lap cholecystectomy  3/11/2014    Hx hernia repair  10/7/2014     incisional with mesh    Hx other surgical  10/08/2012     [facility] Bone Growth Stimulator    Hx total abdominal hysterectomy  12/20/1992     fibroids; Dr. Pernell Puri Hx cyst incision and drainage Left 08/2016    Hx orthopaedic  1996, 1998     back surgery    Hx orthopaedic Bilateral 2013 R, 2014 L     carpal tunnel    Hx orthopaedic  6/27/2013     right knee arthroscopy    Hx orthopaedic Right 3/2015     trigger thumb release    Hx orthopaedic  10/8/2012     ALIF/ PLIF with bone stimulator    Hx orthopaedic  10/23/2012     Back surgery to adjust hardware     Prior Level of Function/Home Situation: used a cane but reports multiple falls  Personal factors and/or comorbidities impacting plan of care:     Home Situation  Home Environment: Private residence  # Steps to Enter: 4  Rails to Enter: Yes  Hand Rails : Bilateral (widespread)  One/Two Story Residence: Two story, live on 1st floor  Interior Rails: Both  Lift Chair Available: No  Living Alone: No  Support Systems: Spouse/Significant Other/Partner  Patient Expects to be Discharged to[de-identified] Private residence  Current DME Used/Available at Home: [employer] aides, Cane, straight, Walker, rolling  Tub or Shower Type: Shower    EXAMINATION/PRESENTATION/DECISION MAKING:   Critical Behavior:  Neurologic State: Alert  Orientation Level: Oriented X4  Cognition: Appropriate decision making, Appropriate for age attention/concentration, Appropriate safety awareness  Safety/Judgement: Awareness of environment, Insight into deficits  Skin:  Not fully observed. Strength:    Strength: Generally decreased, functional (LLE less than RLE; generally 3+/5)                    Tone & Sensation:   Tone: Normal              Sensation: Impaired (intact to gross touch but reports numbness and tingling to BLE that comes and goes)              Range Of Motion:  AROM: Generally decreased, functional (BLE)                       Coordination:  Coordination: Generally decreased, functional    Functional Mobility:  Bed Mobility:  Rolling: Modified independent  Supine to Sit: Modified independent     Scooting: Independent  Transfers:  Sit to Stand: Supervision  Stand to Sit: Contact guard assistance        Bed to Chair: Independent              Balance:   Sitting: Intact  Standing: Intact  Ambulation/Gait Training:  Distance (ft): 100 Feet (ft)  Assistive Device: Gait belt;Walker, rolling  Ambulation - Level of Assistance: Contact guard assistance        Gait Abnormalities: Decreased step clearance; Path deviations        Base of Support: Narrowed     Speed/Corrina: Pace decreased (<100 feet/min)  Step Length: Right shortened;Left shortened                         Stairs:  Number of Stairs Trained: 4  Stairs - Level of Assistance: Contact guard assistance   Rail Use: Right  (single point cane on left)       Functional Measure:  Tinetti test:    Sitting Balance: 1  Arises: 1  Attempts to Rise: 2  Immediate Standing Balance: 1  Standing Balance: 1  Nudged: 2  Eyes Closed: 0  Turn 360 Degrees - Continuous/Discontinuous: 1  Turn 360 Degrees - Steady/Unsteady: 1  Sitting Down: 1  Balance Score: 11  Indication of Gait: 1  R Step Length/Height: 1  L Step Length/Height: 1  R Foot Clearance: 1  L Foot Clearance: 1  Step Symmetry: 1  Step Continuity: 1  Path: 1  Trunk: 1  Walking Time: 1  Gait Score: 10  Total Score: 21       Tinetti Test and G-code impairment scale:  Percentage of Impairment CH    0%   CI    1-19% CJ    20-39% CK    40-59% CL    60-79% CM    80-99% CN     100%   Tinetti  Score 0-28 28 23-27 17-22 12-16 6-11 1-5 0       Tinetti Tool Score Risk of Falls  <19 = High Fall Risk  19-24 = Moderate Fall Risk  25-28 = Low Fall Risk  Tinetti ME. Performance-Oriented Assessment of Mobility Problems in Elderly Patients. Lemus 66; [id number]. (Scoring Description: PT Bulletin Feb. 10, 1993)    Older adults: Dari Mccarthy et al, 2009; n = 1000 [facility] elderly evaluated with ABC, LINDA, ADL, and IADL)  · Mean LINDA score for males aged 69-68 years = 26.21(3.40)  · Mean LINDA score for females age 69-68 years = 25.16(4.30)  · Mean LINDA score for males over 80 years = 23.29(6.02)  · Mean LINDA score for females over 80 years = 17.20(8.32)       G codes: In compliance with CMSs Claims Based Outcome Reporting, the following G-code set was chosen for this patient based on their primary functional limitation being treated: The outcome measure chosen to determine the severity of the functional limitation was the Tinetti  with a score of 21/28 which was correlated with the impairment scale.     ? Mobility - Walking and Moving Around:     - CURRENT STATUS: CJ - 20%-39% impaired, limited or restricted    - GOAL STATUS: CJ - 20%-39% impaired, limited or restricted    - D/C STATUS:  CJ - 20%-39% impaired, limited or restricted        Physical Therapy Evaluation Charge Determination   History Examination Presentation Decision-Making   MEDIUM  Complexity : 1-2 comorbidities / personal factors will impact the outcome/ POC  MEDIUM Complexity : 3 Standardized tests and measures addressing body structure, function, activity limitation and / or participation in recreation  LOW Complexity : Stable, uncomplicated  MEDIUM Complexity : FOTO score of 26-74      Based on the above components, the patient evaluation is determined to be of the following complexity level: LOW    Pain:  Pain Scale 1: Numeric (0 - 10)  Pain Intensity 1: 5  Pain Location 1: Back  Activity Tolerance:   good  Please refer to the flowsheet for vital signs taken during this treatment. After treatment:   [x]   Patient left in no apparent distress sitting up in chair  []   Patient left in no apparent distress in bed  [x]   Call bell left within reach  [x]   Nursing notified  [x]   Caregiver present  []   Bed alarm activated    COMMUNICATION/EDUCATION:   Communication/Collaboration:  [x]   Fall prevention education was provided and the patient/caregiver indicated understanding. [x]   Patient/family have participated as able and agree with findings and recommendations. []   Patient is unable to participate in plan of care at this time.   Findings and recommendations were discussed with: Registered Nurse    Thank you for this referral.  Yobani Griffith, PT   Time Calculation: 25 mins

## 2017-02-09 NOTE — H&P
ORTHOPAEDIC SPINE SURGERY HISTORY & PHYSICAL    CHIEF COMPLAINT: Back pain     HISTORY OF PRESENT ILLNESS: Branden Stanton is a 60year old F that presents to the office for worsening back pain. She has a history of chronic low back pain for approximately 20 years. She also has been dealing with mid-back pain recently. She noticed that her pain has been progressively worsening since September 2016. She has a prior history of a lumbar fusion performed by Dr. Janice English in 2012. Ms. Nikkie Beaver has an implanted \"non-active\" spinal fusion stimulator. She has previously had bilateral SI joint injections performed by a pain management physician without relief. She has also seen a chiropractor with some relief. She ambulates with a cane  On 2/2/17, her back pain became severe with radiating pain down her left leg. She states that she is also having subjective weakness in both legs. She also reports intermittent numbness in both legs with some tingling in her R foot on and off. She was seen in the ED on 2/2/17. X-rays were performed during her ED visit. She was discharged home with oral steroids, hydrocodone and robaxin. She did not get any relief from the medication. She went back to the ED for re-evaluation on 2/5/16. She was prescribed Percocet, valium and lidocaine patches. She states that she started to have bowel incontinence on 2/6/17 at 5 AM. She also states that she is getting the urge to urinate but she has been unable to get to the bathroom in time due to her pain causing her to urinate on herself. She is now wearing adult diapers.    She denies any fever, chills, nausea, vomiting, weight loss, urinary retention or saddle paresthesias    Patient Vitals for the past 24 hrs:   Temp Pulse Resp BP SpO2   02/09/17 0421 97.5 °F (36.4 °C) 78 18 147/82 98 %   02/08/17 2157 98.2 °F (36.8 °C) 77 16 152/78 98 %   02/08/17 1945 - - - 146/68 97 %   02/08/17 1930 - - - 122/74 100 %   02/08/17 1915 - - - 148/67 100 %   02/08/17 1652 98.2 °F (36.8 °C) 83 16 161/76 100 %        PHYSICAL EXAM:   Patient is in mild distress on exam. Mood and affect are appropriate. Cervical Exam: Good range of motion. No tenderness. No midline step-off, crepitus or deformity. Normal range of motion in bilateral upper extremities. 5/5 strength in bilateral upper extremities. Bilateral upper extremities are neurovascularly intact distally. Negative Dial's sign  2+ brachioradialis, biceps and triceps reflexes bilaterally. No hyper-reflexia   Motor and sensory exams are non-focal     Thoracic exam: Limited range of motion due to discomfort. No tenderness. No midline step-off, deformity or crepitus. Lumbar exam: Limited  range of motion due to discomfort. + tenderness. No midline step-off, deformity or crepitus. Normal range of motion in bilateral lower extremities. 4+/5 strength with hip flexion bilaterally, otherwise 5/5 strength in bilateral lower extremities   Negative L SLR test   2+ patellar and Achilles reflexes bilaterally. No clonus. No hyper-reflexia  Motor and sensory exams are non-focal     Bilateral lower extremities are neurovascularly intact distally  No clubbing, cyanosis or edema  Somewhat antalgic gait, ambulates with a cane. She has good voluntary rectal tone on exam. Exam was chaperoned by female MA. RADIOLOGIC STUDIES: I have reviewed her previous thoracic spine x-rays (Dec 2016). The x-rays show a mild scoliosis. No other remarkable findings. I have reviewed her recent lumbar spine x-rays on  PACS. The x-rays show adjacent disc degeneration at L2-L3. Hardware intact from previous L3-S1 anterior/posterior fusion     ASSESSMENT:   Back pain  Bowel incontinence     PLAN:   Patient reports having bowel incontinence. She has good rectal tone on exam.   We tried to get STAT MRIs of the cervical spine, thoracic spine and lumbar spine to rule out spinal cord compression/cauda equina.  We were unable to get the studies done.  Patient was subsequently directed to the Emergency Department for the STAT MRIs. She has a broken wire to her fusion stimulator causing the MRIs to be contraindicated. She will be admitted to our service at [facility] for intractable back pain. She will have CT scans performed to rule out cord compression/cauda equine as discussed with Radiologist, Dr. Ne Haney.      Janice Matthew PA-C  Orthopaedic Surgery  Physician Assistant to Dr. Alexandrea Parker

## 2017-02-14 ENCOUNTER — HOSPITAL ENCOUNTER (OUTPATIENT)
Dept: CT IMAGING | Age: 57
Discharge: HOME | End: 2017-02-14
Attending: ORTHOPAEDIC SURGERY
Payer: COMMERCIAL

## 2017-02-14 ENCOUNTER — HOSPITAL ENCOUNTER (OUTPATIENT)
Dept: GENERAL RADIOLOGY | Age: 57
Discharge: HOME | End: 2017-02-14
Attending: ORTHOPAEDIC SURGERY
Payer: COMMERCIAL

## 2017-02-14 VITALS
SYSTOLIC BLOOD PRESSURE: 141 MMHG | DIASTOLIC BLOOD PRESSURE: 79 MMHG | HEART RATE: 78 BPM | OXYGEN SATURATION: 99 % | RESPIRATION RATE: 16 BRPM

## 2017-02-14 DIAGNOSIS — M54.16 LUMBAR RADICULOPATHY: ICD-10-CM

## 2017-02-14 PROCEDURE — 77030018868 HC TY MYELGRM BD -A

## 2017-02-14 PROCEDURE — 72132 CT LUMBAR SPINE W/DYE: CPT

## 2017-02-14 PROCEDURE — 74011000250 HC RX REV CODE- 250: Performed by: RADIOLOGY

## 2017-02-14 PROCEDURE — 62304 MYELOGRAPHY LUMBAR INJECTION: CPT

## 2017-02-14 PROCEDURE — 74011636320 HC RX REV CODE- 636/320: Performed by: RADIOLOGY

## 2017-02-14 PROCEDURE — 77030003666 HC NDL SPINAL BD -A

## 2017-02-14 RX ORDER — LIDOCAINE HYDROCHLORIDE 10 MG/ML
10 INJECTION INFILTRATION; PERINEURAL
Status: COMPLETED | OUTPATIENT
Start: 2017-02-14 | End: 2017-02-14

## 2017-02-14 RX ADMIN — IOHEXOL 20 ML: 180 INJECTION INTRAVENOUS at 11:05

## 2017-02-14 RX ADMIN — SODIUM BICARBONATE: 0.2 INJECTION, SOLUTION INTRAVENOUS at 11:24

## 2017-02-14 RX ADMIN — LIDOCAINE HYDROCHLORIDE 10 ML: 10 INJECTION, SOLUTION INFILTRATION; PERINEURAL at 11:05

## 2017-02-14 NOTE — IP AVS SNAPSHOT
Current Discharge Medication List  
  
ASK your doctor about these medications Dose & Instructions Dispensing Information Comments Morning Noon Evening Bedtime ADVAIR DISKUS 250-50 mcg/dose diskus inhaler Generic drug:  fluticasone-salmeterol Your next dose is: Today, Tomorrow Other:  ____________ Dose:  1 Puff Take 1 Puff by inhalation every twelve (12) hours. Refills:  0  
     
   
   
   
  
 albuterol 90 mcg/actuation inhaler Commonly known as:  PROVENTIL HFA, VENTOLIN HFA, PROAIR HFA Your next dose is: Today, Tomorrow Other:  ____________ Dose:  2 Puff Take 2 Puffs by inhalation every six (6) hours as needed for Wheezing. Refills:  0  
     
   
   
   
  
 amLODIPine 5 mg tablet Commonly known as:  Elvia Sana Your next dose is: Today, Tomorrow Other:  ____________ Dose:  5 mg Take 5 mg by mouth every evening. Refills:  0  
     
   
   
   
  
 aspirin delayed-release 81 mg tablet Your next dose is: Today, Tomorrow Other:  ____________ Dose:  81 mg Take 81 mg by mouth daily. Refills:  0  
     
   
   
   
  
 BYSTOLIC 10 mg tablet Generic drug:  nebivolol Your next dose is: Today, Tomorrow Other:  ____________ Dose:  10 mg Take 10 mg by mouth daily. Refills:  0  
     
   
   
   
  
 cyanocobalamin 1,000 mcg tablet Your next dose is: Today, Tomorrow Other:  ____________ Dose:  1000 mcg Take 1,000 mcg by mouth daily. Refills:  0  
     
   
   
   
  
 diazePAM 5 mg tablet Commonly known as:  VALIUM Your next dose is: Today, Tomorrow Other:  ____________ Dose:  5 mg Take 1 Tab by mouth three (3) times daily as needed (spasm). Max Daily Amount: 15 mg. Quantity:  40 Tab Refills:  0 Estradiol 0.5 mg/0.5 gram (0.1 %) Glpk Commonly known as:  Natalya Leonard  
   
 Your next dose is: Today, Tomorrow Other:  ____________ Dose:  1 Packet 1 Packet by TransDERmal route daily. Apply 1 packet to thigh daily Quantity:  90 Packet Refills:  3  
     
   
   
   
  
 folic acid 1 mg tablet Commonly known as:  [employer] Your next dose is: Today, Tomorrow Other:  ____________ Dose:  2 mg Take 2 mg by mouth daily. Refills:  0  
     
   
   
   
  
 gabapentin 100 mg capsule Commonly known as:  NEURONTIN Your next dose is: Today, Tomorrow Other:  ____________ Dose:  100 mg Take 100 mg by mouth two (2) times a day. Refills:  0  
     
   
   
   
  
 lidocaine 5 % Commonly known as:  José Lim Your next dose is: Today, Tomorrow Other:  ____________ Apply patch to the affected area for 12 hours a day as needed for pain and remove for 12 hours a day. Quantity:  15 Each Refills:  0  
     
   
   
   
  
 lisinopril 20 mg tablet Commonly known as:  Lorrie Hidalgo Your next dose is: Today, Tomorrow Other:  ____________ Dose:  20 mg Take 20 mg by mouth daily. Refills:  0 LOPID 600 mg tablet Generic drug:  gemfibrozil Your next dose is: Today, Tomorrow Other:  ____________ Dose:  600 mg Take 600 mg by mouth Before breakfast and dinner. Refills:  0  
     
   
   
   
  
 methotrexate 2.5 mg tablet Commonly known as:  Damien Castillo Your next dose is: Today, Tomorrow Other:  ____________ Dose:  17.5 mg Take 17.5 mg by mouth Every Friday. Refills:  0 NexIUM 40 mg capsule Generic drug:  esomeprazole Your next dose is: Today, Tomorrow Other:  ____________ Dose:  40 mg Take 40 mg by mouth Before breakfast and dinner. Refills:  0  
     
   
   
   
  
 oxyCODONE-acetaminophen 5-325 mg per tablet Commonly known as:  PERCOCET Your next dose is: Today, Tomorrow Other:  ____________ Dose:  1-2 Tab Take 1-2 Tabs by mouth every four (4) hours as needed for Pain. Max Daily Amount: 12 Tabs. Quantity:  60 Tab Refills:  0  
     
   
   
   
  
 PLAQUENIL 200 mg tablet Generic drug:  hydroxychloroquine Your next dose is: Today, Tomorrow Other:  ____________ Dose:  200 mg Take 200 mg by mouth two (2) times a day. Refills:  0  
     
   
   
   
  
 predniSONE 10 mg dose pack Commonly known as:  STERAPRED DS Your next dose is: Today, Tomorrow Other:  ____________ Take according to dose pack instructions. Quantity:  21 Tab Refills:  0  
     
   
   
   
  
 pyridoxine (vitamin B6) 100 mg tablet Commonly known as:  VITAMIN B-6 Your next dose is: Today, Tomorrow Other:  ____________ Dose:  100 mg Take 100 mg by mouth daily. Refills:  0  
     
   
   
   
  
 RESTASIS 0.05 % ophthalmic emulsion Generic drug:  cycloSPORINE Your next dose is: Today, Tomorrow Other:  ____________ Dose:  1 Drop Administer 1 Drop to both eyes two (2) times a day. Refills:  0 SAVELLA 50 mg tablet Generic drug:  Milnacipran Your next dose is: Today, Tomorrow Other:  ____________ Dose:  50 mg Take 50 mg by mouth two (2) times a day. Refills:  0  
     
   
   
   
  
 sodium chloride 1 gram tablet Your next dose is: Today, Tomorrow Other:  ____________ Dose:  1 g Take 1 g by mouth daily. Refills:  0  
     
   
   
   
  
 spironolactone 25 mg tablet Commonly known as:  ALDACTONE Your next dose is: Today, Tomorrow Other:  ____________ Dose:  25 mg Take 25 mg by mouth daily. Refills:  0  
     
   
   
   
  
 sucralfate 1 gram tablet Commonly known as:  Yelena Dumont  
   
 Your next dose is: Today, Tomorrow Other:  ____________ Dose:  1 g Take 1 g by mouth Before breakfast, lunch, and dinner. Refills:  0  
     
   
   
   
  
 sulindac 200 mg tablet Commonly known as:  CLINORIL Your next dose is: Today, Tomorrow Other:  ____________ Dose:  200 mg Take 200 mg by mouth two (2) times a day. Refills:  0  
     
   
   
   
  
 therapeutic multivitamin tablet Commonly known as:  [facility] Your next dose is: Today, Tomorrow Other:  ____________ Dose:  1 Tab Take 1 Tab by mouth daily. Refills:  0  
     
   
   
   
  
 VITAMIN D3 2,000 unit Tab Generic drug:  cholecalciferol (vitamin D3) Your next dose is: Today, Tomorrow Other:  ____________ Dose:  2000 Units Take 2,000 Units by mouth daily. Refills:  0

## 2017-02-14 NOTE — DISCHARGE INSTRUCTIONS
POST LUMBAR PUNCTURE/MYELOGRAM/INTRATHECAL CHEMOTHERAPY DISCHARGE INSTRUCTIONS    General Information:  Lumbar Puncture: A LP is done to help diagnose several disorders, like pseudo tumor, migraines, meningitis, and multiple sclerosis. It involves a puncture (usually in the lower spine) into the sac that protects the spinal column. A sample of the fluid in that space is removed and tested in the lab. Myelogram:   A Myelogram involves a lumbar puncture, and instead of removing fluid, contrast will be injected into the sac surrounding the spinal column. It is done to visualize the spinal column, nerve roots, spinal canal, vertebral discs and disc space. It is usually done to diagnose back pain with unknown cause or in preparation for surgery. After the injection, a CT scan will be done, usually within two hours of the injection. Intrathecal Chemotherapy:   Chemotherapy can be given in many forms. Intrathecal chemo involves a lumbar puncture, and instead of removing fluid, the chemo will be injected into the space. After any of these procedures, you will be asked to lie flat on your back for 4-6 hours to prevent complications. You should also rest for 24 hours after you go home, and force fluids. If you have a headache, you should take Tylenol or acetaminophen. Call If:   You should call your Physician and/or the Radiology Nurse if you develop a headache that is not relieved by Tylenol, and worsens when you stand and eases when you lie down, you need to call. You may have developed what is referred to as a spinal headache. Our physicians will probably advise you to be on strict bed rest for 24 hours, to drink lots of fluids and caffeine. If this does not help the head pain, call again the next day. You should call if you have bleeding other than a small spot on your bandage.  You should call if you have any numbness, tingling, weakness, fever, chills, urinary retention, severe itching, rash, welts, swelling, or confusion. Follow-up Instructions: See the doctor who ordered your procedure as he/she has instructed. If you had a Lumbar Puncture or Myelogram, your results should be available to your ordering doctor in 3-5 business days. You can remove your dressing in 24 hours and shower regularly. Do not bathe or swim for 72 hours.      To Reach Us: [facility] [phone] or after hours call [phone]      Patient Signature:  Date: 2/14/2017  Discharging Nurse: Elizabeth Trevino RN

## 2017-02-14 NOTE — IP AVS SNAPSHOT
Gideon [address] [facility] 
[phone] Patient: Kathleen Tillman MRN: [id number] NJD:[id number] You are allergic to the following Allergen Reactions Penicillins Rash Fever. 9/14/16 Reports able to take Keflex without problem. Recent Documentation OB Status Smoking Status Hysterectomy Former Smoker Emergency Contacts Name Discharge Info Relation Home Work Mobile Laura Romeo \"Rebekah\" DISCHARGE CAREGIVER [3] Spouse [3] [phone] About your hospitalization You were admitted on:  February 14, 2017 You last received care in the:  [facility] RADIOLOGY You were discharged on:  February 14, 2017 Unit phone number:  [phone] Why you were hospitalized Your primary diagnosis was:  Not on File Providers Seen During Your Hospitalizations Provider Role Specialty Primary office phone Zenobia Campbell MD Attending Provider Orthopedic Surgery [phone] Your Primary Care Physician (PCP) Primary Care Physician Office Phone Office Fax Jaclyn Shraddha [phone] Follow-up Information None Current Discharge Medication List  
  
ASK your doctor about these medications Dose & Instructions Dispensing Information Comments Morning Noon Evening Bedtime ADVAIR DISKUS 250-50 mcg/dose diskus inhaler Generic drug:  fluticasone-salmeterol Your next dose is: Today, Tomorrow Other:  _________ Dose:  1 Puff Take 1 Puff by inhalation every twelve (12) hours. Refills:  0  
     
   
   
   
  
 albuterol 90 mcg/actuation inhaler Commonly known as:  PROVENTIL HFA, VENTOLIN HFA, PROAIR HFA Your next dose is: Today, Tomorrow Other:  _________ Dose:  2 Puff Take 2 Puffs by inhalation every six (6) hours as needed for Wheezing. Refills:  0 amLODIPine 5 mg tablet Commonly known as:  Lidia Croon Your next dose is: Today, Tomorrow Other:  _________ Dose:  5 mg Take 5 mg by mouth every evening. Refills:  0  
     
   
   
   
  
 aspirin delayed-release 81 mg tablet Your next dose is: Today, Tomorrow Other:  _________ Dose:  81 mg Take 81 mg by mouth daily. Refills:  0  
     
   
   
   
  
 BYSTOLIC 10 mg tablet Generic drug:  nebivolol Your next dose is: Today, Tomorrow Other:  _________ Dose:  10 mg Take 10 mg by mouth daily. Refills:  0  
     
   
   
   
  
 cyanocobalamin 1,000 mcg tablet Your next dose is: Today, Tomorrow Other:  _________ Dose:  1000 mcg Take 1,000 mcg by mouth daily. Refills:  0  
     
   
   
   
  
 diazePAM 5 mg tablet Commonly known as:  VALIUM Your next dose is: Today, Tomorrow Other:  _________ Dose:  5 mg Take 1 Tab by mouth three (3) times daily as needed (spasm). Max Daily Amount: 15 mg. Quantity:  40 Tab Refills:  0 Estradiol 0.5 mg/0.5 gram (0.1 %) Glpk Commonly known as:  Paddy Julian Your next dose is: Today, Tomorrow Other:  _________ Dose:  1 Packet 1 Packet by TransDERmal route daily. Apply 1 packet to thigh daily Quantity:  90 Packet Refills:  3  
     
   
   
   
  
 folic acid 1 mg tablet Commonly known as:  [employer] Your next dose is: Today, Tomorrow Other:  _________ Dose:  2 mg Take 2 mg by mouth daily. Refills:  0  
     
   
   
   
  
 gabapentin 100 mg capsule Commonly known as:  NEURONTIN Your next dose is: Today, Tomorrow Other:  _________ Dose:  100 mg Take 100 mg by mouth two (2) times a day. Refills:  0  
     
   
   
   
  
 lidocaine 5 % Commonly known as:  Ilia De León Your next dose is: Today, Tomorrow Other:  _________ Apply patch to the affected area for 12 hours a day as needed for pain and remove for 12 hours a day. Quantity:  15 Each Refills:  0  
     
   
   
   
  
 lisinopril 20 mg tablet Commonly known as:  Ginyn Diaz Your next dose is: Today, Tomorrow Other:  _________ Dose:  20 mg Take 20 mg by mouth daily. Refills:  0 LOPID 600 mg tablet Generic drug:  gemfibrozil Your next dose is: Today, Tomorrow Other:  _________ Dose:  600 mg Take 600 mg by mouth Before breakfast and dinner. Refills:  0  
     
   
   
   
  
 methotrexate 2.5 mg tablet Commonly known as:  Edgardo Isbell Your next dose is: Today, Tomorrow Other:  _________ Dose:  17.5 mg Take 17.5 mg by mouth Every Friday. Refills:  0 NexIUM 40 mg capsule Generic drug:  esomeprazole Your next dose is: Today, Tomorrow Other:  _________ Dose:  40 mg Take 40 mg by mouth Before breakfast and dinner. Refills:  0  
     
   
   
   
  
 oxyCODONE-acetaminophen 5-325 mg per tablet Commonly known as:  PERCOCET Your next dose is: Today, Tomorrow Other:  _________ Dose:  1-2 Tab Take 1-2 Tabs by mouth every four (4) hours as needed for Pain. Max Daily Amount: 12 Tabs. Quantity:  60 Tab Refills:  0  
     
   
   
   
  
 PLAQUENIL 200 mg tablet Generic drug:  hydroxychloroquine Your next dose is: Today, Tomorrow Other:  _________ Dose:  200 mg Take 200 mg by mouth two (2) times a day. Refills:  0  
     
   
   
   
  
 predniSONE 10 mg dose pack Commonly known as:  STERAPRED DS Your next dose is: Today, Tomorrow Other:  _________ Take according to dose pack instructions. Quantity:  21 Tab Refills:  0  
     
   
   
   
  
 pyridoxine (vitamin B6) 100 mg tablet Commonly known as:  VITAMIN B-6 Your next dose is: Today, Tomorrow Other:  _________ Dose:  100 mg Take 100 mg by mouth daily. Refills:  0  
     
   
   
   
  
 RESTASIS 0.05 % ophthalmic emulsion Generic drug:  cycloSPORINE Your next dose is: Today, Tomorrow Other:  _________ Dose:  1 Drop Administer 1 Drop to both eyes two (2) times a day. Refills:  0 SAVELLA 50 mg tablet Generic drug:  Milnacipran Your next dose is: Today, Tomorrow Other:  _________ Dose:  50 mg Take 50 mg by mouth two (2) times a day. Refills:  0  
     
   
   
   
  
 sodium chloride 1 gram tablet Your next dose is: Today, Tomorrow Other:  _________ Dose:  1 g Take 1 g by mouth daily. Refills:  0  
     
   
   
   
  
 spironolactone 25 mg tablet Commonly known as:  ALDACTONE Your next dose is: Today, Tomorrow Other:  _________ Dose:  25 mg Take 25 mg by mouth daily. Refills:  0  
     
   
   
   
  
 sucralfate 1 gram tablet Commonly known as:  Cassandra Garland Your next dose is: Today, Tomorrow Other:  _________ Dose:  1 g Take 1 g by mouth Before breakfast, lunch, and dinner. Refills:  0  
     
   
   
   
  
 sulindac 200 mg tablet Commonly known as:  CLINORIL Your next dose is: Today, Tomorrow Other:  _________ Dose:  200 mg Take 200 mg by mouth two (2) times a day. Refills:  0  
     
   
   
   
  
 therapeutic multivitamin tablet Commonly known as:  [facility] Your next dose is: Today, Tomorrow Other:  _________ Dose:  1 Tab Take 1 Tab by mouth daily. Refills:  0  
     
   
   
   
  
 VITAMIN D3 2,000 unit Tab Generic drug:  cholecalciferol (vitamin D3) Your next dose is: Today, Tomorrow Other:  _________ Dose:  2000 Units Take 2,000 Units by mouth daily. Refills:  0 Discharge Instructions POST LUMBAR PUNCTURE/MYELOGRAM/INTRATHECAL CHEMOTHERAPY DISCHARGE INSTRUCTIONS General Information: 
Lumbar Puncture: A LP is done to help diagnose several disorders, like pseudo tumor, migraines, meningitis, and multiple sclerosis. It involves a puncture (usually in the lower spine) into the sac that protects the spinal column. A sample of the fluid in that space is removed and tested in the lab. Myelogram: A Myelogram involves a lumbar puncture, and instead of removing fluid, contrast will be injected into the sac surrounding the spinal column. It is done to visualize the spinal column, nerve roots, spinal canal, vertebral discs and disc space. It is usually done to diagnose back pain with unknown cause or in preparation for surgery. After the injection, a CT scan will be done, usually within two hours of the injection. Intrathecal Chemotherapy: 
 Chemotherapy can be given in many forms. Intrathecal chemo involves a lumbar puncture, and instead of removing fluid, the chemo will be injected into the space. After any of these procedures, you will be asked to lie flat on your back for 4-6 hours to prevent complications. You should also rest for 24 hours after you go home, and force fluids. If you have a headache, you should take Tylenol or acetaminophen. Call If: 
 You should call your Physician and/or the Radiology Nurse if you develop a headache that is not relieved by Tylenol, and worsens when you stand and eases when you lie down, you need to call. You may have developed what is referred to as a spinal headache. Our physicians will probably advise you to be on strict bed rest for 24 hours, to drink lots of fluids and caffeine. If this does not help the head pain, call again the next day.  You should call if you have bleeding other than a small spot on your bandage. You should call if you have any numbness, tingling, weakness, fever, chills, urinary retention, severe itching, rash, welts, swelling, or confusion. Follow-up Instructions: See the doctor who ordered your procedure as he/she has instructed. If you had a Lumbar Puncture or Myelogram, your results should be available to your ordering doctor in 3-5 business days. You can remove your dressing in 24 hours and shower regularly. Do not bathe or swim for 72 hours. To Reach Us: [facility] [phone] or after hours call [phone] Patient Signature: 
Date: 2/14/2017 Discharging Nurse: Klaudia Weston RN Discharge Orders None Introducing hospitals & HEALTH SERVICES! Dear Heidi Arriazaer: 
Thank you for requesting a [employer] account. Our records indicate that you already have an active [employer] account. You can access your account anytime at [email or web address] Did you know that you can access your hospital and ER discharge instructions at any time in [employer]? You can also review all of your test results from your hospital stay or ER visit. Additional Information If you have questions, please visit the Frequently Asked Questions section of the [employer] website at [email or web address] Remember, [employer] is NOT to be used for urgent needs. For medical emergencies, dial 911. Now available from your iPhone and Android! General Information Please provide this summary of care documentation to your next provider. Patient Signature:  ____________________________________________________________ Date:  ____________________________________________________________  
  
Deonqlyn Newcomer Provider Signature:  ____________________________________________________________ Date:  ____________________________________________________________

## 2017-02-14 NOTE — PROGRESS NOTES
Pt brought back to EEG [facility] RN) holding area. Pt laying supine and procedure site c/d/i with band aid in place. Pt reporting baseline pain that ranges between 5-7/10. Pt denying pain at the procedure site. 1157 - VS taken /79, 78 HR, 99% O2 on room air. 1208 - Reviewed discharge instructions with Pt. Pt familiar with this procedure from previous Myelograms. Pt able to verbalize understanding. Opportunity for questions/clarifications. 1218 - Pt requesting significant other to be brought back to holding area. SO at the bedside and visiting with Pt. Pt drinking fluids. Procedure site c/d/i  1233 - Pt resting. Pt given disc  1240 - Authorization to disclose health information signed by spouse at patient's request.  0838 - Pt laying in stretcher and attempted several times to sit to leave but reported severe nerve pain in her leg. Pt encouraged to rest as long as need until ready to leave. Pt given ice and assisted with frequent repositioning. 1415 - Pt requesting to use the BR and able to void. Pt taken to BR and then vehicle/spouse in a wheel chair. Pt able to ambulate on own with standby assist.  Pt expressed gratitude with staff for serve and patience in allowing her to take her time and to allow pain to subside some before leaving.   Alphonse Licona MD in the coy as Pt was being transported out and confirmed to patient that she has a disc pressing on her nerve and to follow up with ordering MD.

## 2017-02-15 NOTE — DISCHARGE SUMMARY
DISCHARGE SUMMARY     Patient: Jonathan Gleason                             Medical Record Number: [id number]                : 1960  Age: 60 y.o. Admit Date: 2017  Discharge Date: 2017  Admission Diagnosis: Intractable back pain  Surgeon: Suman Wilkins MD  Complications: None     History of Present Illness:  Jonathan Gleason is a 60 y.o. female with a 20 year history of chronic low back and radiculopathy. She presented to our office on 17 for progressively worsening back pain since 2016. She has a prior history of a lumbar fusion performed by Dr. Hawk Aguiar in . Ms. Dudley Fisher has an implanted \"non-active\" spinal fusion stimulator. On 17, her back pain became severe with radiating pain down her left leg. She stated that she is also having subjective weakness in both legs. She also reports intermittent numbness in both legs with some tingling in her R foot on and off. She was seen in the ED on 17. X-rays were performed during the ED visit. She was discharged home with oral steroids, hydrocodone and robaxin. She did not get any relief from the medication. She went back to the ED for re-evaluation on 16. She was prescribed Percocet, valium and lidocaine patches. On 17, she states that she started to have bowel incontinence at 5 AM. She does have the urge to urinate but she has been unable to get to the bathroom in time due to her pain causing her to urinate on herself. She is now wearing adult diapers. She has good rectal tone/voluntary control of her sphincter noted. She has denied any fever, chills, nausea, vomiting, weight loss, night sweats, urinary retention or saddle paresthesias     Hospital Course: Our office was unable to get STAT outpatient MRIs performed. She was advised to go to the ED again. Her repeat x-rays showed a broken wire of her spinal fusion stimulator. This is a contraindication to having MRIs performed. She was admitted to the Orthopedic service from the ED.  We were able to obtain STAT CT scans of her cervical spine, thoracic spine and lumbar spine during her admission. Her cervical spine and thoracic spine CTs did not show any evidence of cord compression. Her lumbar spine CT showed severe spinal stenosis at L2-L3 without evidence of spinal cord compression. It was determined that she did not have spinal cord compression or cauda equina syndrome. Emergent surgery was not indicated. Guadalupe Joseph improved with receiving IV steroids and IV pain medication. She remained neurovascularly intact during her admission. The patient was afebrile and vital signs were stable. Calves were soft and non-tender bilaterally. Guadalupe Joseph will be promptly set up for an outpatient CT myelogram of her lumbar spine. She will then be scheduled for a lumbar fusion surgery with Dr Cristal Britt. Patient agrees with plan. She was discharged Home in stable condition on 2/9/17. She was provided with routine instructions and advised to follow up in our office in 6 days following discharge from the hospital.  She was given prescriptions for medication to control her symptoms. Discharge Medications:  Discharge Medication List as of 2/9/2017  4:19 PM      CONTINUE these medications which have CHANGED    Details   oxyCODONE-acetaminophen (PERCOCET) 5-325 mg per tablet Take 1-2 Tabs by mouth every four (4) hours as needed for Pain. Max Daily Amount: 12 Tabs., Print, Disp-60 Tab, R-0      diazePAM (VALIUM) 5 mg tablet Take 1 Tab by mouth three (3) times daily as needed (spasm). Max Daily Amount: 15 mg., Print, Disp-40 Tab, R-0         CONTINUE these medications which have NOT CHANGED    Details   fluticasone-salmeterol (ADVAIR DISKUS) 250-50 mcg/dose diskus inhaler Take 1 Puff by inhalation every twelve (12) hours. , Historical Med      albuterol (PROVENTIL HFA, VENTOLIN HFA, PROAIR HFA) 90 mcg/actuation inhaler Take 2 Puffs by inhalation every six (6) hours as needed for Wheezing., Historical Med      amLODIPine (NORVASC) 5 mg tablet Take 5 mg by mouth every evening., Historical Med      nebivolol (BYSTOLIC) 10 mg tablet Take 10 mg by mouth daily. , Historical Med      cyanocobalamin 1,000 mcg tablet Take 1,000 mcg by mouth daily. , Historical Med      cycloSPORINE (RESTASIS) 0.05 % ophthalmic emulsion Administer 1 Drop to both eyes two (2) times a day., Historical Med      esomeprazole (NEXIUM) 40 mg capsule Take 40 mg by mouth Before breakfast and dinner., Historical Med      folic acid (FOLVITE) 1 mg tablet Take 2 mg by mouth daily. , Historical Med      gemfibrozil (LOPID) 600 mg tablet Take 600 mg by mouth Before breakfast and dinner., Historical Med      hydroxychloroquine (PLAQUENIL) 200 mg tablet Take 200 mg by mouth two (2) times a day., Historical Med      lisinopril (PRINIVIL, ZESTRIL) 20 mg tablet Take 20 mg by mouth daily. , Historical Med      sulindac (CLINORIL) 200 mg tablet Take 200 mg by mouth two (2) times a day., Historical Med      spironolactone (ALDACTONE) 25 mg tablet Take 25 mg by mouth daily. , Historical Med      gabapentin (NEURONTIN) 100 mg capsule Take 100 mg by mouth two (2) times a day., Historical Med      aspirin delayed-release 81 mg tablet Take 81 mg by mouth daily. , Historical Med      cholecalciferol, vitamin D3, (VITAMIN D3) 2,000 unit tab Take 2,000 Units by mouth daily. , Historical Med      sodium chloride 1 gram tablet Take 1 g by mouth daily. , Historical Med      methotrexate (RHEUMATREX) 2.5 mg tablet Take 17.5 mg by mouth Every Friday., Historical Med      therapeutic multivitamin (THERAGRAN) tablet Take 1 Tab by mouth daily. , Historical Med      pyridoxine, vitamin B6, (VITAMIN B-6) 100 mg tablet Take 100 mg by mouth daily. , Historical Med      Milnacipran (SAVELLA) 50 mg tablet Take 50 mg by mouth two (2) times a day., Historical Med      sucralfate (CARAFATE) 1 gram tablet Take 1 g by mouth Before breakfast, lunch, and dinner., Historical Med      lidocaine (LIDODERM) 5 % Apply patch to the affected area for 12 hours a day as needed for pain and remove for 12 hours a day., Print, Disp-15 Each, R-0      predniSONE (STERAPRED DS) 10 mg dose pack Take according to dose pack instructions. , Print, Disp-21 Tab, R-0      Estradiol (DIVIGEL) 0.5 mg (0.1 %) glpk 1 Packet by TransDERmal route daily.  Apply 1 packet to thigh daily, Normal, Disp-90 Packet, R-3               Wali Sherman  2/9/2017  Orthopaedic Surgery  Physician Assistant to Dr. Manuel Malone

## 2017-09-28 ENCOUNTER — HOSPITAL ENCOUNTER (OUTPATIENT)
Dept: MAMMOGRAPHY | Age: 57
Discharge: HOME | End: 2017-09-28
Attending: OBSTETRICS & GYNECOLOGY
Payer: COMMERCIAL

## 2017-09-28 DIAGNOSIS — Z12.31 VISIT FOR SCREENING MAMMOGRAM: ICD-10-CM

## 2017-09-28 PROCEDURE — 77063 BREAST TOMOSYNTHESIS BI: CPT

## 2017-10-03 ENCOUNTER — OFFICE VISIT (OUTPATIENT)
Dept: OBGYN CLINIC | Age: 57
End: 2017-10-03

## 2017-10-03 VITALS
BODY MASS INDEX: 28.49 KG/M2 | DIASTOLIC BLOOD PRESSURE: 77 MMHG | WEIGHT: 171 LBS | HEIGHT: 65 IN | HEART RATE: 75 BPM | SYSTOLIC BLOOD PRESSURE: 138 MMHG

## 2017-10-03 DIAGNOSIS — N95.1 MENOPAUSAL SYMPTOMS: ICD-10-CM

## 2017-10-03 DIAGNOSIS — Z01.419 ENCOUNTER FOR WELL WOMAN EXAM WITH ROUTINE GYNECOLOGICAL EXAM: Primary | ICD-10-CM

## 2017-10-03 NOTE — PATIENT INSTRUCTIONS
Well Visit, Women 48 to 72: Care Instructions  Your Care Instructions  Physical exams can help you stay healthy. Your doctor has checked your overall health and may have suggested ways to take good care of yourself. He or she also may have recommended tests. At home, you can help prevent illness with healthy eating, regular exercise, and other steps. Follow-up care is a key part of your treatment and safety. Be sure to make and go to all appointments, and call your doctor if you are having problems. It's also a good idea to know your test results and keep a list of the medicines you take. How can you care for yourself at home? · Reach and stay at a healthy weight. This will lower your risk for many problems, such as obesity, diabetes, heart disease, and high blood pressure. · Get at least 30 minutes of exercise on most days of the week. Walking is a good choice. You also may want to do other activities, such as running, swimming, cycling, or playing tennis or team sports. · Do not smoke. Smoking can make health problems worse. If you need help quitting, talk to your doctor about stop-smoking programs and medicines. These can increase your chances of quitting for good. · Protect your skin from too much sun. When you're outdoors from 10 a.m. to 4 p.m., stay in the shade or cover up with clothing and a hat with a wide brim. Wear sunglasses that block UV rays. Even when it's cloudy, put broad-spectrum sunscreen (SPF 30 or higher) on any exposed skin. · See a dentist one or two times a year for checkups and to have your teeth cleaned. · Wear a seat belt in the car. · Limit alcohol to 1 drink a day. Too much alcohol can cause health problems. Follow your doctor's advice about when to have certain tests. These tests can spot problems early. · Cholesterol.  Your doctor will tell you how often to have this done based on your age, family history, or other things that can increase your risk for heart attack and stroke. · Blood pressure. Have your blood pressure checked during a routine doctor visit. Your doctor will tell you how often to check your blood pressure based on your age, your blood pressure results, and other factors. · Mammogram. Ask your doctor how often you should have a mammogram, which is an X-ray of your breasts. A mammogram can spot breast cancer before it can be felt and when it is easiest to treat. · Pap test and pelvic exam. Ask your doctor how often you should have a Pap test. You may not need to have a Pap test as often as you used to. · Vision. Have your eyes checked every year or two or as often as your doctor suggests. Some experts recommend that you have yearly exams for glaucoma and other age-related eye problems starting at age 48. · Hearing. Tell your doctor if you notice any change in your hearing. You can have tests to find out how well you hear. · Diabetes. Ask your doctor whether you should have tests for diabetes. · Colon cancer. You should begin tests for colon cancer at age 48. You may have one of several tests. Your doctor will tell you how often to have tests based on your age and risk. Risks include whether you already had a precancerous polyp removed from your colon or whether your parents, sisters and brothers, or children have had colon cancer. · Thyroid disease. Talk to your doctor about whether to have your thyroid checked as part of a regular physical exam. Women have an increased chance of a thyroid problem. · Osteoporosis. You should begin tests for bone density at age 72. If you are younger than 72, ask your doctor whether you have factors that may increase your risk for this disease. You may want to have this test before age 72. · Heart attack and stroke risk. At least every 4 to 6 years, you should have your risk for heart attack and stroke assessed.  Your doctor uses factors such as your age, blood pressure, cholesterol, and whether you smoke or have diabetes to show what your risk for a heart attack or stroke is over the next 10 years. When should you call for help? Watch closely for changes in your health, and be sure to contact your doctor if you have any problems or symptoms that concern you. Where can you learn more? Go to http://flory-tyra.info/. Enter S338 in the search box to learn more about \"Well Visit, Women 50 to 72: Care Instructions. \"  Current as of: July 19, 2016  Content Version: 11.3  © 8094-2635 [employer], [employer]. Care instructions adapted under license by [employer] (which disclaims liability or warranty for this information). If you have questions about a medical condition or this instruction, always ask your healthcare professional. [address] any warranty or liability for your use of this information.

## 2017-10-03 NOTE — PROGRESS NOTES
Annual exam ages 40-58 post hysterectomy    Laura Rivers is a ,  59 y.o. female [facility] No LMP recorded. Patient has had a hysterectomy. TERRI (5# tumor\")    She presents for her annual checkup. She is having increased hot flashes, desires to increase divigel dose. Colonoscopy UTD, due ~. Had laminectomy - was having nerve impingement, affecting bowel/bladder function    Hormonal status:  She reports no perimenstrual type symptoms. She is not having vasomotor symptoms. The patient is not using any ERT. Sexual history:    She  reports that she does not currently engage in sexual activity. She reports using the following method of birth control/protection: Surgical.    Medical conditions:    Since her last annual GYN exam about one year ago on 16, she has not the following changes in her health history: none. Surgical history confirmed with patient. has a past surgical history that includes tonsil and adenoidectomy (); lumbar laminectomy; lumbar fusion; lap cholecystectomy (3/11/2014); hernia repair (10/7/2014); other surgical (10/08/2012); total abdominal hysterectomy (1992); cyst incision and drainage (Left, 2016); orthopaedic (, ); orthopaedic (Bilateral,  R,  L); orthopaedic (2013); orthopaedic (Right, 3/2015); orthopaedic (10/8/2012); orthopaedic (10/23/2012); and lumbar laminectomy (2017). Pap and Mammogram History:    Her most recent Pap smear was normal, obtained 1 year(s) ago on 16. The patient had a recent mammogram 17 which was negative for malignancy. Breast Cancer History/Substance Abuse: negative    Last Colonoscopy approx. 2-3 yrs ago, rpt in 10 yrs. Osteoporosis History:    Family history does not include a first or second degree relative with osteopenia or osteoporosis. Dx'd with osteopenia. Takes Calcium, Vit D.       Past Medical History:   Diagnosis Date    Asthma     activity induced asthma     Autoimmune disease ([facility] [address]     mixed connective tissue disease    Cancer ([facility] [address]     skin-basal    Chronic pain     lower back, joints    DDD (degenerative disc disease), lumbar     DJD (degenerative joint disease)     ETOH abuse     Sober 12-13-11    GERD (gastroesophageal reflux disease)     Hernia     Hx of bronchitis     Hx of mammogram 09/28/2017    Negative     Hx of seizure disorder 2010    unknown cause, none since    Hx of sinusitis     Hyperlipemia     Hypertension     Hypertriglyceridemia     Hyponatremia     Insomnia     Lupus     Murmur, cardiac     Neuropathic pain     Osteopenia     Other bursitis of hip, right hip 09/14/2016    Had a deep hip injection for pain     Routine Papanicolaou smear 09/20/2016    Negative (no hpv)     Stroke ([facility] [address] 3/11    PCP states she had a TIA - patient denies this (was low Na)    Tobacco abuse     Stopped in 2012     Past Surgical History:   Procedure Laterality Date    HX CYST INCISION AND DRAINAGE Left 08/2016    HX HERNIA REPAIR  10/7/2014    incisional with mesh    HX LAP CHOLECYSTECTOMY  3/11/2014    HX LUMBAR FUSION      10/8/2012    HX LUMBAR LAMINECTOMY      rods in 1996, out [address]  02/2017    [address]    back surgery    HX ORTHOPAEDIC Bilateral 2013 R, 2014 L    carpal tunnel    HX ORTHOPAEDIC  6/27/2013    right knee arthroscopy    HX ORTHOPAEDIC Right 3/2015    trigger thumb release    HX ORTHOPAEDIC  10/8/2012    ALIF/ PLIF with bone stimulator    HX ORTHOPAEDIC  10/23/2012    Back surgery to adjust hardware    HX OTHER SURGICAL  10/08/2012    [facility] Bone Growth Stimulator    HX TONSIL AND ADENOIDECTOMY  1962    HX TOTAL ABDOMINAL HYSTERECTOMY  12/20/1992    fibroids; Dr. Michelle Clifton       Current Outpatient Prescriptions   Medication Sig Dispense Refill    Estradiol (DIVIGEL) 1 mg/gram (0.1 %) glpk 1 Packet by TransDERmal route daily.  Apply 1 packet to thigh daily 90 Packet 4    diazePAM (VALIUM) 5 mg tablet Take 1 Tab by mouth three (3) times daily as needed (spasm). Max Daily Amount: 15 mg. 40 Tab 0    fluticasone-salmeterol (ADVAIR DISKUS) 250-50 mcg/dose diskus inhaler Take 1 Puff by inhalation every twelve (12) hours.  albuterol (PROVENTIL HFA, VENTOLIN HFA, PROAIR HFA) 90 mcg/actuation inhaler Take 2 Puffs by inhalation every six (6) hours as needed for Wheezing.  amLODIPine (NORVASC) 5 mg tablet Take 5 mg by mouth every evening.  nebivolol (BYSTOLIC) 10 mg tablet Take 10 mg by mouth daily.  cycloSPORINE (RESTASIS) 0.05 % ophthalmic emulsion Administer 1 Drop to both eyes two (2) times a day.  esomeprazole (NEXIUM) 40 mg capsule Take 40 mg by mouth Before breakfast and dinner.  folic acid (FOLVITE) 1 mg tablet Take 2 mg by mouth daily.  gemfibrozil (LOPID) 600 mg tablet Take 600 mg by mouth Before breakfast and dinner.  hydroxychloroquine (PLAQUENIL) 200 mg tablet Take 200 mg by mouth two (2) times a day.  lisinopril (PRINIVIL, ZESTRIL) 20 mg tablet Take 20 mg by mouth daily.  sulindac (CLINORIL) 200 mg tablet Take 200 mg by mouth two (2) times a day.  spironolactone (ALDACTONE) 25 mg tablet Take 25 mg by mouth daily.  gabapentin (NEURONTIN) 100 mg capsule Take 100 mg by mouth two (2) times a day.  cholecalciferol, vitamin D3, (VITAMIN D3) 2,000 unit tab Take 2,000 Units by mouth daily.  sodium chloride 1 gram tablet Take 1 g by mouth daily.  methotrexate (RHEUMATREX) 2.5 mg tablet Take 17.5 mg by mouth Every Friday.  therapeutic multivitamin (THERAGRAN) tablet Take 1 Tab by mouth daily.  pyridoxine, vitamin B6, (VITAMIN B-6) 100 mg tablet Take 100 mg by mouth daily.  Milnacipran (SAVELLA) 50 mg tablet Take 50 mg by mouth two (2) times a day.  sucralfate (CARAFATE) 1 gram tablet Take 1 g by mouth Before breakfast, lunch, and dinner.       predniSONE (STERAPRED DS) 10 mg dose pack Take according to dose pack instructions. 21 Tab 0    oxyCODONE-acetaminophen (PERCOCET) 5-325 mg per tablet Take 1-2 Tabs by mouth every four (4) hours as needed for Pain. Max Daily Amount: 12 Tabs. 60 Tab 0    cyanocobalamin 1,000 mcg tablet Take 1,000 mcg by mouth daily.  aspirin delayed-release 81 mg tablet Take 81 mg by mouth daily.  lidocaine (LIDODERM) 5 % Apply patch to the affected area for 12 hours a day as needed for pain and remove for 12 hours a day. 15 Each 0     Allergies: Penicillins     Tobacco History:  reports that she quit smoking about 5 years ago. She has a 64.00 pack-year smoking history. She has never used smokeless tobacco.  Alcohol Abuse:  reports that she does not drink alcohol. Drug Abuse:  reports that she does not use illicit drugs.     Family Medical/Cancer History:   Family History   Problem Relation Age of Onset    Hypertension Mother     Stroke Mother      TIA    Arthritis-osteo Father      RA    No Known Problems Sister     No Known Problems Brother     No Known Problems Sister     No Known Problems Sister         Review of Systems - History obtained from the patient  Constitutional: negative for weight loss, fever, night sweats  HEENT: negative for hearing loss, earache, congestion, snoring, sorethroat  CV: negative for chest pain, palpitations, edema  Resp: negative for cough, shortness of breath, wheezing  GI: negative for change in bowel habits, abdominal pain, black or bloody stools  : negative for frequency, dysuria, hematuria, vaginal discharge  MSK: negative for back pain, joint pain, muscle pain  Breast: negative for breast lumps, nipple discharge, galactorrhea  Skin :negative for itching, rash, hives  Neuro: negative for dizziness, headache, confusion, weakness  Psych: negative for anxiety, depression, change in mood  Heme/lymph: negative for bleeding, bruising, pallor    Physical Exam    Visit Vitals    /77    Pulse 75    Ht 5' 5\" (1.651 m)    Wt 171 lb (77.6 kg)    BMI 28.46 kg/m2     Constitutional  · Appearance: well-nourished, well developed, alert, in no acute distress    HENT  · Head and Face: appears normal    Neck  · Inspection/Palpation: normal appearance, no masses or tenderness  · Lymph Nodes: no lymphadenopathy present  · Thyroid: gland size normal, nontender, no nodules or masses present on palpation    Chest  · Respiratory Effort: breathing unlabored  · Auscultation: normal breath sounds    Cardiovascular  · Heart:  · Auscultation: regular rate and rhythm without murmur    Breasts  · Inspection of Breasts: breasts symmetrical, no skin changes, no discharge present, nipple appearance normal, no skin retraction present  · Palpation of Breasts and Axillae: no masses present on palpation, no breast tenderness  · Axillary Lymph Nodes: no lymphadenopathy present    Gastrointestinal  · Abdominal Examination: abdomen non-tender to palpation, normal bowel sounds, no masses present  · Liver and spleen: no hepatomegaly present, spleen not palpable  · Hernias: no hernias identified    Genitourinary  · External Genitalia: normal appearance for age, no discharge present, no tenderness present, no inflammatory lesions present, no masses present, significant atrophy present  · Vagina: normal vaginal vault without central or paravaginal defects, no discharge present, no inflammatory lesions present, no masses present  · Bladder: non-tender to palpation  · Urethra: appears normal  · Cervix: absent  · Uterus: absent  · Adnexa: no adnexal tenderness present, no adnexal masses present  · Perineum: perineum within normal limits, no evidence of trauma, no rashes or skin lesions present  · Anus: anus within normal limits, no hemorrhoids present  · Inguinal Lymph Nodes: no lymphadenopathy present  RV:  No masses    Skin  · General Inspection: no rash, no lesions identified    Neurologic/Psychiatric  · Mental Status:  · Orientation: grossly oriented to person, place and time  · Mood and Affect: mood normal, affect appropriate    Assessment:  · Routine gynecologic examination  · Her current medical status is satisfactory with no evidence of significant gynecologic issues. · On Divigel for vasomotor sx - less frequent, but still intense, several in the morning and at night. · H/o TERRI for benign indications -exit routine screening      Plan:  Counseled re: diet, exercise, healthy lifestyle  Return for yearly wellness visits  Rec annual mammogram.  Done , BIRAD 1  Will increase Divigel to 1mg, eRX #90 RFx4    Orders Placed This Encounter    Estradiol (DIVIGEL) 1 mg/gram (0.1 %) glpk     Si Packet by TransDERmal route daily.  Apply 1 packet to thigh daily     Dispense:  90 Packet     Refill:  4

## 2018-10-01 ENCOUNTER — HOSPITAL ENCOUNTER (OUTPATIENT)
Dept: MAMMOGRAPHY | Age: 58
Discharge: HOME | End: 2018-10-01
Attending: OBSTETRICS & GYNECOLOGY
Payer: COMMERCIAL

## 2018-10-01 DIAGNOSIS — Z12.31 VISIT FOR SCREENING MAMMOGRAM: ICD-10-CM

## 2018-10-01 PROCEDURE — 77063 BREAST TOMOSYNTHESIS BI: CPT

## 2018-10-09 ENCOUNTER — OFFICE VISIT (OUTPATIENT)
Dept: OBGYN CLINIC | Age: 58
End: 2018-10-09

## 2018-10-09 VITALS
BODY MASS INDEX: 29.66 KG/M2 | SYSTOLIC BLOOD PRESSURE: 122 MMHG | DIASTOLIC BLOOD PRESSURE: 72 MMHG | RESPIRATION RATE: 16 BRPM | HEIGHT: 65 IN | WEIGHT: 178 LBS | HEART RATE: 63 BPM

## 2018-10-09 DIAGNOSIS — N95.1 MENOPAUSAL SYMPTOMS: ICD-10-CM

## 2018-10-09 DIAGNOSIS — Z01.419 WELL FEMALE EXAM WITH ROUTINE GYNECOLOGICAL EXAM: Primary | ICD-10-CM

## 2018-10-09 RX ORDER — PREGABALIN 225 MG/1
225 CAPSULE ORAL 2 TIMES DAILY
COMMUNITY

## 2018-10-09 NOTE — PROGRESS NOTES
Annual exam ages 40-58 Davey Martinez is a [de-identified] ,  59 y.o. female [facility] [place] No LMP recorded. Patient has had a hysterectomy. TERRI (\"5# tumor\") She presents for her annual checkup. She is having no significant problems. Doing well gyn. Had back surgery in January. Still having issues. Hx of mult surgeries. At [facility] last year, [place] increased to 1mg for increased vasomotor sx. Doing well at this dose, only rare hot flash. Wishes to continue for now. With regard to the Gardasil vaccine, she is older than the age for which it is FDA approved. Sexual history: She  reports that she does not currently engage in sexual activity. Medical conditions: 
 
Since her last annual GYN exam about one year ago, she has not the following changes in her health history: back surgery in January. Colonoscopy UTD. 2014 -> 10yrs Pap and Mammogram History: 
 
Her most recent Pap smear was normal, 9/20/16. H/o TERRI, no abnl paps -> exit routine screening. The patient had a recent mammogram 10/1/18 which was negative for malignancy. Breast Cancer History/Substance Abuse: negative Osteoporosis History: 
 
Family history does not include a first or second degree relative with osteopenia or osteoporosis. A bone density scan was obtained 4/2016and revealed osteopenia Past Medical History:  
Diagnosis Date  Asthma   
 activity induced asthma  Autoimmune disease ([facility] [address]   
 mixed connective tissue disease  Cancer ([facility] [address] skin-basal  
 Chronic pain   
 lower back, joints  DDD (degenerative disc disease), lumbar  DJD (degenerative joint disease)  ETOH abuse Sober 12-13-11  GERD (gastroesophageal reflux disease)  Hernia  Hx of bronchitis  Hx of mammogram 10/01/2018 Negative  Hx of seizure disorder 2010  
 unknown cause, none since  Hx of sinusitis  Hyperlipemia  Hypertension  Hypertriglyceridemia  Hyponatremia  Insomnia  Lupus  Murmur, cardiac  Neuropathic pain  Osteopenia  Other bursitis of hip, right hip 09/14/2016 Had a deep hip injection for pain  Routine Papanicolaou smear 09/20/2016 Negative (no hpv)  Stroke [facility]) 3/11 PCP states she had a TIA - patient denies this (was low Na)  Tobacco abuse Stopped in 2012 Past Surgical History:  
Procedure Laterality Date  HX CYST INCISION AND DRAINAGE Left 08/2016  HX HERNIA REPAIR  10/7/2014  
 incisional with mesh  HX LAP CHOLECYSTECTOMY  3/11/2014  HX LUMBAR FUSION    
 10/8/2012  HX LUMBAR LAMINECTOMY    
 rods in 1996, out 1998  [facility]  
 [facility]  HX ORTHOPAEDIC Bilateral 2013 R, 2014 L  
 carpal tunnel  HX ORTHOPAEDIC  6/27/2013  
 right knee arthroscopy  HX ORTHOPAEDIC Right 3/2015  
 trigger thumb release  HX ORTHOPAEDIC  10/8/2012 ALIF/ PLIF with bone stimulator  HX ORTHOPAEDIC  10/23/2012 Back surgery to adjust hardware  HX OTHER SURGICAL  10/08/[facility]  HX TOTAL ABDOMINAL HYSTERECTOMY  12/20/1992  
 fibroids; Dr. Delfino Moraes Current Outpatient Prescriptions Medication Sig Dispense Refill  pregabalin (LYRICA) 150 mg capsule Take  by mouth.  Estradiol (DIVIGEL) 1 mg/gram (0.1 %) glpk 1 Packet by TransDERmal route daily. Apply 1 packet to thigh daily 90 Packet 4  
 fluticasone-salmeterol (ADVAIR DISKUS) 250-50 mcg/dose diskus inhaler Take 1 Puff by inhalation every twelve (12) hours.  albuterol (PROVENTIL HFA, VENTOLIN HFA, PROAIR HFA) 90 mcg/actuation inhaler Take 2 Puffs by inhalation every six (6) hours as needed for Wheezing.  nebivolol (BYSTOLIC) 10 mg tablet Take 10 mg by mouth daily.  cyanocobalamin 1,000 mcg tablet Take 1,000 mcg by mouth daily.     
 cycloSPORINE (RESTASIS) 0.05 % ophthalmic emulsion Administer 1 Drop to both eyes two (2) times a day.  esomeprazole (NEXIUM) 40 mg capsule Take 40 mg by mouth Before breakfast and dinner.  folic acid (FOLVITE) 1 mg tablet Take 2 mg by mouth daily.  gemfibrozil (LOPID) 600 mg tablet Take 600 mg by mouth Before breakfast and dinner.  hydroxychloroquine (PLAQUENIL) 200 mg tablet Take 200 mg by mouth two (2) times a day.  lisinopril (PRINIVIL, ZESTRIL) 20 mg tablet Take 20 mg by mouth daily.  spironolactone (ALDACTONE) 25 mg tablet Take 25 mg by mouth daily.  cholecalciferol, vitamin D3, (VITAMIN D3) 2,000 unit tab Take 2,000 Units by mouth daily.  sodium chloride 1 gram tablet Take 1 g by mouth daily.  methotrexate (RHEUMATREX) 2.5 mg tablet Take 17.5 mg by mouth Every Friday.  therapeutic multivitamin (THERAGRAN) tablet Take 1 Tab by mouth daily.  pyridoxine, vitamin B6, (VITAMIN B-6) 100 mg tablet Take 100 mg by mouth daily.  sucralfate (CARAFATE) 1 gram tablet Take 1 g by mouth Before breakfast, lunch, and dinner. Allergies: Penicillins Tobacco History:  reports that she quit smoking about 6 years ago. She has a 64.00 pack-year smoking history. She has never used smokeless tobacco. 
Alcohol Abuse:  reports that she does not drink alcohol. Drug Abuse:  reports that she does not use illicit drugs. Family Medical/Cancer History:  
Family History Problem Relation Age of Onset  Hypertension Mother  Stroke Mother TIA  Arthritis-osteo Father RA  
 No Known Problems Sister  No Known Problems Brother  No Known Problems Sister  No Known Problems Sister Review of Systems - History obtained from the patient Constitutional: negative for weight loss, fever, night sweats HEENT: negative for hearing loss, earache, congestion, snoring, sorethroat CV: negative for chest pain, palpitations, edema Resp: negative for cough, shortness of breath, wheezing GI: negative for change in bowel habits, abdominal pain, black or bloody stools : negative for frequency, dysuria, hematuria, vaginal discharge MSK: ling h/o back pain, mult surgeries Breast: negative for breast lumps, nipple discharge, galactorrhea Skin :negative for itching, rash, hives Neuro: negative for dizziness, headache, confusion, weakness Psych: negative for anxiety, depression, change in mood Heme/lymph: negative for bleeding, bruising, pallor Physical Exam 
 
Visit Vitals  /72 (BP 1 Location: Left arm, BP Patient Position: Sitting)  Pulse 63  Resp 16  
 Ht 5' 5\" (1.651 m)  Wt 178 lb (80.7 kg)  BMI 29.62 kg/m2 Constitutional 
· Appearance: well-nourished, well developed, alert, in no acute distress HENT 
· Head and Face: appears normal 
 
Neck · Inspection/Palpation: normal appearance, no masses or tenderness · Lymph Nodes: no lymphadenopathy present · Thyroid: gland size normal, nontender, no nodules or masses present on palpation Chest 
· Respiratory Effort: breathing unlabored · Auscultation: normal breath sounds Cardiovascular · Heart: 
· Auscultation: regular rate and rhythm without murmur Breasts · Inspection of Breasts: breasts symmetrical, no skin changes, no discharge present, nipple appearance normal, no skin retraction present · Palpation of Breasts and Axillae: no masses present on palpation, no breast tenderness · Axillary Lymph Nodes: no lymphadenopathy present Gastrointestinal 
· Abdominal Examination: abdomen non-tender to palpation, normal bowel sounds, no masses present · Liver and spleen: no hepatomegaly present, spleen not palpable · Hernias: no hernias identified Genitourinary · External Genitalia: normal appearance for age, no discharge present, no tenderness present, no inflammatory lesions present, no masses present, no atrophy present · Vagina: normal vaginal vault without central or paravaginal defects, no discharge present, no inflammatory lesions present, no masses present · Bladder: non-tender to palpation · Urethra: appears normal 
· Cervix: absent · Uterus: absent · Adnexa: no adnexal tenderness present, no adnexal masses present · Perineum: perineum within normal limits, no evidence of trauma, no rashes or skin lesions present · Anus: anus within normal limits, no hemorrhoids present · Inguinal Lymph Nodes: no lymphadenopathy present RV:  No masses Skin · General Inspection: no rash, no lesions identified Neurologic/Psychiatric · Mental Status: · Orientation: grossly oriented to person, place and time · Mood and Affect: mood normal, affect appropriate Assessment: 
Routine gynecologic examination Her current medical status is satisfactory with no evidence of significant gynecologic issues. Vasomotor sx well controlled on Divigel H/o TERRI. No h/o abnl paps. Nl pap 2016 -> exit routine screening Plan: 
Counseled re: diet, exercise, healthy lifestyle Return for yearly wellness visits Rec annual mammogram.  Due 10/2019 
eRX Divigel 1mg #90 RFx4 Consider decreasing dose back down to 0.5mg next year. Orders Placed This Encounter  Estradiol (DIVIGEL) 1 mg/gram (0.1 %) glpk Si Packet by TransDERmal route daily. Apply 1 packet to thigh daily Dispense:  90 Packet Refill:  4

## 2018-10-09 NOTE — PATIENT INSTRUCTIONS
Well Visit, Women 48 to 72: Care Instructions Your Care Instructions Physical exams can help you stay healthy. Your doctor has checked your overall health and may have suggested ways to take good care of yourself. He or she also may have recommended tests. At home, you can help prevent illness with healthy eating, regular exercise, and other steps. Follow-up care is a key part of your treatment and safety. Be sure to make and go to all appointments, and call your doctor if you are having problems. It's also a good idea to know your test results and keep a list of the medicines you take. How can you care for yourself at home? · Reach and stay at a healthy weight. This will lower your risk for many problems, such as obesity, diabetes, heart disease, and high blood pressure. · Get at least 30 minutes of exercise on most days of the week. Walking is a good choice. You also may want to do other activities, such as running, swimming, cycling, or playing tennis or team sports. · Do not smoke. Smoking can make health problems worse. If you need help quitting, talk to your doctor about stop-smoking programs and medicines. These can increase your chances of quitting for good. · Protect your skin from too much sun. When you're outdoors from 10 a.m. to 4 p.m., stay in the shade or cover up with clothing and a hat with a wide brim. Wear sunglasses that block UV rays. Even when it's cloudy, put broad-spectrum sunscreen (SPF 30 or higher) on any exposed skin. · See a dentist one or two times a year for checkups and to have your teeth cleaned. · Wear a seat belt in the car. · Limit alcohol to 1 drink a day. Too much alcohol can cause health problems. Follow your doctor's advice about when to have certain tests. These tests can spot problems early. · Cholesterol.  Your doctor will tell you how often to have this done based on your age, family history, or other things that can increase your risk for heart attack and stroke. · Blood pressure. Have your blood pressure checked during a routine doctor visit. Your doctor will tell you how often to check your blood pressure based on your age, your blood pressure results, and other factors. · Mammogram. Ask your doctor how often you should have a mammogram, which is an X-ray of your breasts. A mammogram can spot breast cancer before it can be felt and when it is easiest to treat. · Pap test and pelvic exam. Ask your doctor how often you should have a Pap test. You may not need to have a Pap test as often as you used to. · Vision. Have your eyes checked every year or two or as often as your doctor suggests. Some experts recommend that you have yearly exams for glaucoma and other age-related eye problems starting at age 48. · Hearing. Tell your doctor if you notice any change in your hearing. You can have tests to find out how well you hear. · Diabetes. Ask your doctor whether you should have tests for diabetes. · Colon cancer. You should begin tests for colon cancer at age 48. You may have one of several tests. Your doctor will tell you how often to have tests based on your age and risk. Risks include whether you already had a precancerous polyp removed from your colon or whether your parents, sisters and brothers, or children have had colon cancer. · Thyroid disease. Talk to your doctor about whether to have your thyroid checked as part of a regular physical exam. Women have an increased chance of a thyroid problem. · Osteoporosis. You should begin tests for bone density at age 72. If you are younger than 72, ask your doctor whether you have factors that may increase your risk for this disease. You may want to have this test before age 72. · Heart attack and stroke risk. At least every 4 to 6 years, you should have your risk for heart attack and stroke assessed.  Your doctor uses factors such as your age, blood pressure, cholesterol, and whether you smoke or have diabetes to show what your risk for a heart attack or stroke is over the next 10 years. When should you call for help? Watch closely for changes in your health, and be sure to contact your doctor if you have any problems or symptoms that concern you. Where can you learn more? Go to http://flory-tyra.info/. Enter Y780 in the search box to learn more about \"Well Visit, Women 50 to 72: Care Instructions. \" Current as of: March 29, 2018 Content Version: 11.8 © 7674-7110 Healthwise, Incorporated. Care instructions adapted under license by [employer] (which disclaims liability or warranty for this information). If you have questions about a medical condition or this instruction, always ask your healthcare professional. [address] any warranty or liability for your use of this information.

## 2018-10-09 NOTE — MR AVS SNAPSHOT
[address] [facility] [address] [place] [address] 
[phone] Patient: Jacoby Arroyo MRN: [id number] VNV:[id number] Visit Information Date & Time Provider Department Dept. Phone Encounter #  
 10/9/2018  9:30  [address] [phone] [id number] Upcoming Health Maintenance Date Due Hepatitis C Screening 1960 FOBT Q 1 YEAR AGE 50-75 7/31/2008 Influenza Age 5 to Adult 8/1/2018 PAP AKA CERVICAL CYTOLOGY 9/20/2019 BREAST CANCER SCRN MAMMOGRAM 10/1/2019 Allergies as of 10/9/2018  Review Complete On: 10/9/2018 By: Natalie Howard LPN Severity Noted Reaction Type Reactions Penicillins  12/14/2011    Rash Fever. 9/14/16 Reports able to take Keflex without problem. Current Immunizations  Reviewed on 10/9/2018 Name Date ZZZ-RETIRED (DO NOT USE) Pneumococcal Vaccine (Unspecified Type) 10/26/2012  6:58 AM  
  
 Reviewed by Natalie Howard LPN on 27/2/5252 at  9:34 AM  
You Were Diagnosed With   
  
 Codes Comments Well female exam with routine gynecological exam    -  Primary ICD-10-CM: G27.550 ICD-9-CM: V72.31 Vitals BP Pulse Resp Height(growth percentile) Weight(growth percentile) BMI  
 122/72 (BP 1 Location: Left arm, BP Patient Position: Sitting) 63 16 5' 5\" (1.651 m) 178 lb (80.7 kg) 29.62 kg/m2 OB Status Smoking Status Hysterectomy Former Smoker BMI and BSA Data Body Mass Index Body Surface Area  
 29.62 kg/m 2 1.92 m 2 Preferred Pharmacy Pharmacy Name Phone [address] [facility], [address] [facility] [address] Your Updated Medication List  
  
   
This list is accurate as of 10/9/18  9:47 AM.  Always use your most recent med list.  
  
  
  
  
 Tom Guitar 250-50 mcg/dose diskus inhaler Generic drug:  fluticasone-salmeterol Take 1 Puff by inhalation every twelve (12) hours. albuterol 90 mcg/actuation inhaler Commonly known as:  PROVENTIL HFA, VENTOLIN HFA, PROAIR HFA Take 2 Puffs by inhalation every six (6) hours as needed for Wheezing. amLODIPine 5 mg tablet Commonly known as:  Lilgriceldae [place]ot Take 5 mg by mouth every evening. aspirin delayed-release 81 mg tablet Take 81 mg by mouth daily. BYSTOLIC 10 mg tablet Generic drug:  nebivolol Take 10 mg by mouth daily. cyanocobalamin 1,000 mcg tablet Take 1,000 mcg by mouth daily. diazePAM 5 mg tablet Commonly known as:  VALIUM Take 1 Tab by mouth three (3) times daily as needed (spasm). Max Daily Amount: 15 mg.  
  
 Estradiol 1 mg/gram (0.1 %) Glpk Commonly known as:  DIVIGEL  
1 Packet by TransDERmal route daily. Apply 1 packet to thigh daily  
  
 folic acid 1 mg tablet Commonly known as:  Google Take 2 mg by mouth daily. gabapentin 100 mg capsule Commonly known as:  NEURONTIN Take 100 mg by mouth two (2) times a day. lidocaine 5 % Commonly known as:  Cami Negus Apply patch to the affected area for 12 hours a day as needed for pain and remove for 12 hours a day. lisinopril 20 mg tablet Commonly known as:  [place] Take 20 mg by mouth daily. LOPID 600 mg tablet Generic drug:  gemfibrozil Take 600 mg by mouth Before breakfast and dinner. LYRICA 150 mg capsule Generic drug:  pregabalin Take  by mouth. methotrexate 2.5 mg tablet Commonly known as:  Rebecca Gouge Take 17.5 mg by mouth Every Friday. NexIUM 40 mg capsule Generic drug:  esomeprazole Take 40 mg by mouth Before breakfast and dinner. oxyCODONE-acetaminophen 5-325 mg per tablet Commonly known as:  PERCOCET Take 1-2 Tabs by mouth every four (4) hours as needed for Pain. Max Daily Amount: 12 Tabs. PLAQUENIL 200 mg tablet Generic drug:  hydroxychloroquine Take 200 mg by mouth two (2) times a day. predniSONE 10 mg dose pack Commonly known as:  STERAPRED DS Take according to dose pack instructions. pyridoxine (vitamin B6) 100 mg tablet Commonly known as:  VITAMIN B-6 Take 100 mg by mouth daily. RESTASIS 0.05 % ophthalmic emulsion Generic drug:  cycloSPORINE Administer 1 Drop to both eyes two (2) times a day. SAVELLA 50 mg tablet Generic drug:  Milnacipran Take 50 mg by mouth two (2) times a day. sodium chloride 1 gram tablet Take 1 g by mouth daily. spironolactone 25 mg tablet Commonly known as:  ALDACTONE Take 25 mg by mouth daily. sucralfate 1 gram tablet Commonly known as:  B[place]on [place] Take 1 g by mouth Before breakfast, lunch, and dinner. sulindac 200 mg tablet Commonly known as:  CLINORIL Take 200 mg by mouth two (2) times a day. therapeutic multivitamin tablet Commonly known as:  [facility] Take 1 Tab by mouth daily. VITAMIN D3 2,000 unit Tab Generic drug:  cholecalciferol (vitamin D3) Take 2,000 Units by mouth daily. Patient Instructions Well Visit, Women 48 to 72: Care Instructions Your Care Instructions Physical exams can help you stay healthy. Your doctor has checked your overall health and may have suggested ways to take good care of yourself. He or she also may have recommended tests. At home, you can help prevent illness with healthy eating, regular exercise, and other steps. Follow-up care is a key part of your treatment and safety. Be sure to make and go to all appointments, and call your doctor if you are having problems. It's also a good idea to know your test results and keep a list of the medicines you take. How can you care for yourself at home? · Reach and stay at a healthy weight. This will lower your risk for many problems, such as obesity, diabetes, heart disease, and high blood pressure. · Get at least 30 minutes of exercise on most days of the week. Walking is a good choice. You also may want to do other activities, such as running, swimming, cycling, or playing tennis or team sports. · Do not smoke. Smoking can make health problems worse. If you need help quitting, talk to your doctor about stop-smoking programs and medicines. These can increase your chances of quitting for good. · Protect your skin from too much sun. When you're outdoors from 10 a.m. to 4 p.m., stay in the shade or cover up with clothing and a hat with a wide brim. Wear sunglasses that block UV rays. Even when it's cloudy, put broad-spectrum sunscreen (SPF 30 or higher) on any exposed skin. · See a dentist one or two times a year for checkups and to have your teeth cleaned. · Wear a seat belt in the car. · Limit alcohol to 1 drink a day. Too much alcohol can cause health problems. Follow your doctor's advice about when to have certain tests. These tests can spot problems early. · Cholesterol. Your doctor will tell you how often to have this done based on your age, family history, or other things that can increase your risk for heart attack and stroke. · Blood pressure. Have your blood pressure checked during a routine doctor visit. Your doctor will tell you how often to check your blood pressure based on your age, your blood pressure results, and other factors. · Mammogram. Ask your doctor how often you should have a mammogram, which is an X-ray of your breasts. A mammogram can spot breast cancer before it can be felt and when it is easiest to treat. · Pap test and pelvic exam. Ask your doctor how often you should have a Pap test. You may not need to have a Pap test as often as you used to. · Vision. Have your eyes checked every year or two or as often as your doctor suggests. Some experts recommend that you have yearly exams for glaucoma and other age-related eye problems starting at age 48. · Hearing. Tell your doctor if you notice any change in your hearing. You can have tests to find out how well you hear. · Diabetes. Ask your doctor whether you should have tests for diabetes. · Colon cancer. You should begin tests for colon cancer at age 48. You may have one of several tests. Your doctor will tell you how often to have tests based on your age and risk. Risks include whether you already had a precancerous polyp removed from your colon or whether your parents, sisters and brothers, or children have had colon cancer. · Thyroid disease. Talk to your doctor about whether to have your thyroid checked as part of a regular physical exam. Women have an increased chance of a thyroid problem. · Osteoporosis. You should begin tests for bone density at age 72. If you are younger than 72, ask your doctor whether you have factors that may increase your risk for this disease. You may want to have this test before age 72. · Heart attack and stroke risk. At least every 4 to 6 years, you should have your risk for heart attack and stroke assessed. Your doctor uses factors such as your age, blood pressure, cholesterol, and whether you smoke or have diabetes to show what your risk for a heart attack or stroke is over the next 10 years. When should you call for help? Watch closely for changes in your health, and be sure to contact your doctor if you have any problems or symptoms that concern you. Where can you learn more? Go to http://flory-tyra.info/. Enter O607 in the search box to learn more about \"Well Visit, Women 50 to 72: Care Instructions. \" Current as of: March 29, 2018 Content Version: 11.8 © 9489-2725 [employer]. Care instructions adapted under license by [employer] (which disclaims liability or warranty for this information).  If you have questions about a medical condition or this instruction, always ask your healthcare professional. [employer] disclaims any warranty or liability for your use of this information. Introducing [facility]! Dear Jayesh Lagunas 137: 
Thank you for requesting a ChoicePass account. Our records indicate that you already have an active ChoicePass account. You can access your account anytime at [email or web address] Did you know that you can access your hospital and ER discharge instructions at any time in ChoicePass? You can also review all of your test results from your hospital stay or ER visit. Additional Information If you have questions, please visit the Frequently Asked Questions section of the ChoicePass website at [email or web address] Remember, ChoicePass is NOT to be used for urgent needs. For medical emergencies, dial 911. Now available from your iPhone and Android! Please provide this summary of care documentation to your next provider. Your primary care clinician is listed as Phys Other. If you have any questions after today's visit, please call [phone].

## 2019-10-02 ENCOUNTER — HOSPITAL ENCOUNTER (OUTPATIENT)
Dept: MAMMOGRAPHY | Age: 59
Discharge: HOME | End: 2019-10-02
Attending: OBSTETRICS & GYNECOLOGY
Payer: COMMERCIAL

## 2019-10-02 DIAGNOSIS — Z12.39 BREAST SCREENING: ICD-10-CM

## 2019-10-02 PROCEDURE — 77063 BREAST TOMOSYNTHESIS BI: CPT

## 2019-10-10 NOTE — PROGRESS NOTES
Annual exam ages 40-58 post hysterectomy      Radha Cabello is a ,  62 y.o. female   No LMP recorded. Patient has had a hysterectomy. LV=10/9/18 for AE. She presents for her annual checkup. She is having no significant problems. With regard to the Gardasil vaccine, she is older than the FDA approved age to receive it. Hormonal status:  She reports no perimenstrual type symptoms. She is not having vasomotor symptoms. The patient is using any ERT. Divigel. Divigel increased to 1mg a couple of years ago with improvement in vasomotor sx. At AE last had discussed trying to decrease dose this year. Sexual history:    She  reports that she does not currently engage in sexual activity. She reports using the following method of birth control/protection: Surgical.    Medical conditions:    Since her last annual GYN exam about one year ago on 10/9/18, she has not the following changes in her health history:  Stopped Nexium, switched to [address] surgery in April. DEXA done earlier this year, still showed osteopenia. Started on Fosamax per rheumatology. Surgical history confirmed with patient. has a past surgical history that includes hx tonsil and adenoidectomy (); hx lumbar laminectomy; hx lumbar fusion; hx lap cholecystectomy (3/11/2014); hx hernia repair (10/7/2014); hx other surgical (10/08/2012); hx total abdominal hysterectomy (1992); hx cyst incision and drainage (Left, 2016); hx orthopaedic (, ); hx orthopaedic (Bilateral,  R,  L); hx orthopaedic (2013); hx orthopaedic (Right, 3/2015); hx orthopaedic (10/8/2012); hx orthopaedic (10/23/2012); hx lumbar laminectomy (2017); hx orthopaedic (2018); and hx orthopaedic (2019). Pap and Mammogram History:    Her most recent Pap smear was normal, obtained on 16 -> Exit screening. The patient had a recent mammogram 10/2/19 which was negative for malignancy.     Breast Cancer History/Substance Abuse: negative    Osteoporosis History:    Family history does not include a first or second degree relative with osteopenia or osteoporosis.       Past Medical History:   Diagnosis Date    Asthma     activity induced asthma     Autoimmune disease ([address]     mixed connective tissue disease    Cancer ([facility] [address]     skin-basal    Chronic pain     lower back, joints    DDD (degenerative disc disease), lumbar     DJD (degenerative joint disease)     ETOH abuse     Sober 12-13-11    GERD (gastroesophageal reflux disease)     Hernia     Hx of bronchitis     Hx of mammogram 10/02/2019    Negative     Hx of seizure disorder 2010    unknown cause, none since    Hx of sinusitis     Hyperlipemia     Hypertension     Hypertriglyceridemia     Hyponatremia     Insomnia     Lupus (HCC)     Murmur, cardiac     Neuropathic pain     Osteopenia     DEXA (2019), started on Fosamax per rheumatology    Other bursitis of hip, right hip 09/14/2016    Had a deep hip injection for pain     Routine Papanicolaou smear 09/20/2016    Negative (no hpv)     Stroke ([facility] [address] 3/11    PCP states she had a TIA - patient denies this (was low Na)    Tobacco abuse     Stopped in 2012     Past Surgical History:   Procedure Laterality Date    HX CYST INCISION AND DRAINAGE Left 08/2016    HX HERNIA REPAIR  10/7/2014    incisional with mesh    HX LAP CHOLECYSTECTOMY  3/11/2014    HX LUMBAR FUSION      10/8/2012    HX LUMBAR LAMINECTOMY      rods in 1996, out 1998    HX LUMBAR LAMINECTOMY  02/2017    [address]    back surgery    HX ORTHOPAEDIC Bilateral 2013 R, 2014 L    carpal tunnel    HX ORTHOPAEDIC  6/27/2013    right knee arthroscopy    HX ORTHOPAEDIC Right 3/2015    trigger thumb release    HX ORTHOPAEDIC  10/8/2012    ALIF/ PLIF with bone stimulator    HX ORTHOPAEDIC  10/23/2012    Back surgery to adjust hardware    HX ORTHOPAEDIC  01/2018    ALIF - fusion L2-L3    HX ORTHOPAEDIC 04/01/2019    Back surgery    HX OTHER SURGICAL  10/08/2012    [facility] Bone Growth Stimulator    HX TONSIL AND ADENOIDECTOMY  1962    HX TOTAL ABDOMINAL HYSTERECTOMY  12/20/1992    fibroids; Dr. Elliot Steward       Current Outpatient Medications   Medication Sig Dispense Refill    dexlansoprazole (DEXILANT) 60 mg CpDB capsule (delayed release)       furosemide (LASIX) 20 mg tablet Lasix 20 mg tablet      ondansetron hcl (ZOFRAN) 4 mg tablet Take 4 mg by mouth every eight (8) hours as needed for Nausea.  estradiol (DIVIGEL) 0.75 mg/0.75 gram (0.1%) glpk 1 Packet by TransDERmal route daily. 90 Packet 4    alendronate (FOSAMAX) 70 mg tablet Take  by mouth.  pregabalin (LYRICA) 150 mg capsule Take  by mouth.  fluticasone-salmeterol (ADVAIR DISKUS) 250-50 mcg/dose diskus inhaler Take 1 Puff by inhalation every twelve (12) hours.  albuterol (PROVENTIL HFA, VENTOLIN HFA, PROAIR HFA) 90 mcg/actuation inhaler Take 2 Puffs by inhalation every six (6) hours as needed for Wheezing.  nebivolol (BYSTOLIC) 10 mg tablet Take 10 mg by mouth daily.  cyanocobalamin 1,000 mcg tablet Take 1,000 mcg by mouth daily.  cycloSPORINE (RESTASIS) 0.05 % ophthalmic emulsion Administer 1 Drop to both eyes two (2) times a day.  folic acid (FOLVITE) 1 mg tablet Take 2 mg by mouth daily.  gemfibrozil (LOPID) 600 mg tablet Take 600 mg by mouth Before breakfast and dinner.  hydroxychloroquine (PLAQUENIL) 200 mg tablet Take 200 mg by mouth two (2) times a day.  cholecalciferol, vitamin D3, (VITAMIN D3) 2,000 unit tab Take 2,000 Units by mouth daily.  sodium chloride 1 gram tablet Take 1 g by mouth daily.  methotrexate (RHEUMATREX) 2.5 mg tablet Take 17.5 mg by mouth Every Friday.  therapeutic multivitamin (THERAGRAN) tablet Take 1 Tab by mouth daily.  pyridoxine, vitamin B6, (VITAMIN B-6) 100 mg tablet Take 100 mg by mouth daily.        Allergies: Penicillins     Tobacco History: reports that she quit smoking about 7 years ago. She has a 64.00 pack-year smoking history. She has never used smokeless tobacco.  Alcohol Abuse:  reports that she does not drink alcohol. Drug Abuse:  reports that she does not use drugs.     Family Medical/Cancer History:   Family History   Problem Relation Age of Onset    Hypertension Mother     Stroke Mother         TIA    Arthritis-osteo Father         RA    No Known Problems Sister     No Known Problems Brother     No Known Problems Sister     No Known Problems Sister         Review of Systems - History obtained from the patient  Constitutional: negative for weight loss, fever, night sweats  HEENT: negative for hearing loss, earache, congestion, snoring, sorethroat  CV: negative for chest pain, palpitations, edema  Resp: negative for cough, shortness of breath, wheezing  GI: negative for change in bowel habits, abdominal pain, black or bloody stools  : negative for frequency, dysuria, hematuria, vaginal discharge  MSK: +tendonitis in thighs, some knee pain  Breast: negative for breast lumps, nipple discharge, galactorrhea  Skin :negative for itching, rash, hives  Neuro: negative for dizziness, headache, confusion, weakness  Psych: negative for anxiety, depression, change in mood  Heme/lymph: negative for bleeding, bruising, pallor    Physical Exam    Visit Vitals  /74 (BP 1 Location: Right arm, BP Patient Position: Sitting)   Pulse 62   Ht 5' 4\" (1.626 m)   Wt 171 lb (77.6 kg)   BMI 29.35 kg/m²     Constitutional  · Appearance: well-nourished, well developed, alert, in no acute distress    HENT  · Head and Face: appears normal    Neck  · Inspection/Palpation: normal appearance, no masses or tenderness  · Lymph Nodes: no lymphadenopathy present  · Thyroid: gland size normal, nontender, no nodules or masses present on palpation    Chest  · Respiratory Effort: breathing unlabored  · Auscultation: normal breath sounds    Cardiovascular  · Heart:  · Auscultation: regular rate and rhythm without murmur    Breasts  · Inspection of Breasts: breasts symmetrical, no skin changes, no discharge present, nipple appearance normal, no skin retraction present  · Palpation of Breasts and Axillae: no masses present on palpation, no breast tenderness  · Axillary Lymph Nodes: no lymphadenopathy present    Gastrointestinal  · Abdominal Examination: abdomen non-tender to palpation, normal bowel sounds, no masses present  · Liver and spleen: no hepatomegaly present, spleen not palpable  · Hernias: no hernias identified    Genitourinary  · External Genitalia: normal appearance for age, no discharge present, no tenderness present, no inflammatory lesions present, no masses present, atrophy present  · Vagina: normal vaginal vault without central or paravaginal defects, no discharge present, no inflammatory lesions present, no masses present  · Bladder: non-tender to palpation  · Urethra: appears normal  · Cervix: absent  · Uterus: absent  · Adnexa: no adnexal tenderness present, no adnexal masses present  · Perineum: perineum within normal limits, no evidence of trauma, no rashes or skin lesions present  · Anus: anus within normal limits, no hemorrhoids present  · Inguinal Lymph Nodes: no lymphadenopathy present    Skin  · General Inspection: no rash, no lesions identified    Neurologic/Psychiatric  · Mental Status:  · Orientation: grossly oriented to person, place and time  · Mood and Affect: mood normal, affect appropriate    Assessment:  Routine gynecologic examination  Her current medical status is satisfactory with no evidence of significant gynecologic issues. Vasomotor sx improved with Divigel  H/o TERRI. No h/o abnl paps. Nl pap 2016 -> exit routine screening    Plan:  Counseled re: diet, exercise, healthy lifestyle  Return for yearly wellness visits  Rec annual mammogram.  Nl 10/2/19  Will decrease Divigel from 1mg to 0.75. [address] sent. Still has 1mg packets. Can alternated dosing for more gradual transition. Orders Placed This Encounter    estradiol (DIVIGEL) 0.75 mg/0.75 gram (0.1%) glpk     Si Packet by TransDERmal route daily.      Dispense:  90 Packet     Refill:  4

## 2019-10-15 ENCOUNTER — OFFICE VISIT (OUTPATIENT)
Dept: OBGYN CLINIC | Age: 59
End: 2019-10-15

## 2019-10-15 VITALS
BODY MASS INDEX: 29.19 KG/M2 | HEART RATE: 62 BPM | WEIGHT: 171 LBS | DIASTOLIC BLOOD PRESSURE: 74 MMHG | HEIGHT: 64 IN | SYSTOLIC BLOOD PRESSURE: 130 MMHG

## 2019-10-15 DIAGNOSIS — Z01.419 WELL FEMALE EXAM WITH ROUTINE GYNECOLOGICAL EXAM: Primary | ICD-10-CM

## 2019-10-15 RX ORDER — FUROSEMIDE 20 MG/1
20 TABLET ORAL
COMMUNITY
Start: 2019-02-23

## 2019-10-15 RX ORDER — DEXLANSOPRAZOLE 60 MG/1
1 CAPSULE, DELAYED RELEASE ORAL EVERY MORNING
COMMUNITY
Start: 2019-07-16

## 2019-10-15 RX ORDER — ALENDRONATE SODIUM 70 MG/1
TABLET ORAL
COMMUNITY
End: 2020-10-16

## 2019-10-15 RX ORDER — ONDANSETRON 4 MG/1
4 TABLET, FILM COATED ORAL
COMMUNITY
End: 2020-10-16

## 2019-10-15 NOTE — PATIENT INSTRUCTIONS
Well Visit, Women 48 to 72: Care Instructions  Your Care Instructions    Physical exams can help you stay healthy. Your doctor has checked your overall health and may have suggested ways to take good care of yourself. He or she also may have recommended tests. At home, you can help prevent illness with healthy eating, regular exercise, and other steps. Follow-up care is a key part of your treatment and safety. Be sure to make and go to all appointments, and call your doctor if you are having problems. It's also a good idea to know your test results and keep a list of the medicines you take. How can you care for yourself at home? · Reach and stay at a healthy weight. This will lower your risk for many problems, such as obesity, diabetes, heart disease, and high blood pressure. · Get at least 30 minutes of exercise on most days of the week. Walking is a good choice. You also may want to do other activities, such as running, swimming, cycling, or playing tennis or team sports. · Do not smoke. Smoking can make health problems worse. If you need help quitting, talk to your doctor about stop-smoking programs and medicines. These can increase your chances of quitting for good. · Protect your skin from too much sun. When you're outdoors from 10 a.m. to 4 p.m., stay in the shade or cover up with clothing and a hat with a wide brim. Wear sunglasses that block UV rays. Even when it's cloudy, put broad-spectrum sunscreen (SPF 30 or higher) on any exposed skin. · See a dentist one or two times a year for checkups and to have your teeth cleaned. · Wear a seat belt in the car. Follow your doctor's advice about when to have certain tests. These tests can spot problems early. · Cholesterol. Your doctor will tell you how often to have this done based on your age, family history, or other things that can increase your risk for heart attack and stroke. · Blood pressure.  Have your blood pressure checked during a routine doctor visit. Your doctor will tell you how often to check your blood pressure based on your age, your blood pressure results, and other factors. · Mammogram. Ask your doctor how often you should have a mammogram, which is an X-ray of your breasts. A mammogram can spot breast cancer before it can be felt and when it is easiest to treat. · Pap test and pelvic exam. Ask your doctor how often you should have a Pap test. You may not need to have a Pap test as often as you used to. · Vision. Have your eyes checked every year or two or as often as your doctor suggests. Some experts recommend that you have yearly exams for glaucoma and other age-related eye problems starting at age 48. · Hearing. Tell your doctor if you notice any change in your hearing. You can have tests to find out how well you hear. · Diabetes. Ask your doctor whether you should have tests for diabetes. · Colorectal cancer. Your risk for colorectal cancer gets higher as you get older. Some experts say that adults should start regular screening at age 48 and stop at age 76. Others say to start before age 48 or continue after age 76. Talk with your doctor about your risk and when to start and stop screening. · Thyroid disease. Talk to your doctor about whether to have your thyroid checked as part of a regular physical exam. Women have an increased chance of a thyroid problem. · Osteoporosis. You should begin tests for bone density at age 72. If you are younger than 72, ask your doctor whether you have factors that may increase your risk for this disease. You may want to have this test before age 72. · Heart attack and stroke risk. At least every 4 to 6 years, you should have your risk for heart attack and stroke assessed. Your doctor uses factors such as your age, blood pressure, cholesterol, and whether you smoke or have diabetes to show what your risk for a heart attack or stroke is over the next 10 years.   When should you call for help?  Watch closely for changes in your health, and be sure to contact your doctor if you have any problems or symptoms that concern you. Where can you learn more? Go to http://flory-tyra.info/. Enter I581 in the search box to learn more about \"Well Visit, Women 50 to 72: Care Instructions. \"  Current as of: December 13, 2018  Content Version: 12.2  © 2973-2322 [employer]. Care instructions adapted under license by [employer] (which disclaims liability or warranty for this information). If you have questions about a medical condition or this instruction, always ask your healthcare professional. [address] any warranty or liability for your use of this information.

## 2020-10-02 ENCOUNTER — HOSPITAL ENCOUNTER (OUTPATIENT)
Dept: MAMMOGRAPHY | Age: 60
Discharge: HOME | End: 2020-10-02
Attending: OBSTETRICS & GYNECOLOGY
Payer: COMMERCIAL

## 2020-10-02 DIAGNOSIS — Z12.31 VISIT FOR SCREENING MAMMOGRAM: ICD-10-CM

## 2020-10-02 PROCEDURE — 77063 BREAST TOMOSYNTHESIS BI: CPT

## 2020-10-15 NOTE — PROGRESS NOTES
Annual exam ages 40-58    Denise Dang is a ,  56 y.o. female [facility] No LMP recorded. Patient has had a hysterectomy. , who presents for her annual checkup. TERRI  LV=10/15/19    Since her last annual GYN exam about one year ago,  she has the following changes in her health history:   - had colonoscopy    ADDITIONAL CONCERNS:  She is having significant intermittent urinary and fecal incontinence. Has been doing Kegel excercises at home. Had colonoscopy through GI, sx worse since then. Overall, getting more frequent. Was given RX from PCP for the urinary incontinence. With regard to the Gardasil vaccine, she is older than the age for which it is FDA approved. Menstrual status:    Her periods are nonexistent in flow. On Divigel 0.75mg, would like to decrease dose again this year. Contraception:    The current method of family planning is none. Sexual history:     reports previously being sexually active. She reports using the following method of birth control/protection: Surgical.        Pap and Mammogram History:    Her most recent Pap smear was normal, HPV was negative, obtained 16. She does not have a history of abnormal pap smears. The patient had a recent mammogram 10/2/20 which was negative for malignancy. Osteoporosis History:    Family history does not include a first or second degree relative with osteopenia or osteoporosis. A bone density scan was obtained  and revealed osteopenia. She iis currently taking calcium and vit D. Past Medical History:   Diagnosis Date    Asthma     activity induced asthma     Autoimmune disease ([address]     mixed connective tissue disease    Cancer ([address]     skin-basal    Chronic pain     lower back, joints    DDD (degenerative disc disease), lumbar     Dense breast tissue on mammogram 10/02/2020    BI-RADS 1: Negative.     DJD (degenerative joint disease)     ETOH abuse     Sober 11    GERD (gastroesophageal reflux disease)     Hernia     Hx of bronchitis     Hx of mammogram 10/02/2019    Negative     Hx of seizure disorder 2010    unknown cause, none since    Hx of sinusitis     Hyperlipemia     Hypertension     Hypertriglyceridemia     Hyponatremia     Insomnia     Lupus ([address]     Murmur, cardiac     Neuropathic pain     Osteopenia     DEXA (), started on Fosamax per rheumatology    Other bursitis of hip, right hip 2016    Had a deep hip injection for pain     Routine Papanicolaou smear 2016    Negative (no hpv)     Stroke ([address] 3/11    PCP states she had a TIA - patient denies this (was low Na)    Tobacco abuse     Stopped in      Past Surgical History:   Procedure Laterality Date    HX CYST INCISION AND DRAINAGE Left 2016    HX HERNIA REPAIR  10/7/2014    incisional with mesh    HX LAP CHOLECYSTECTOMY  3/11/2014    HX LUMBAR FUSION      10/8/2012    HX LUMBAR LAMINECTOMY      rods in , out [address]  2017    HX [address]    back surgery    HX ORTHOPAEDIC Bilateral  R, 2014 L    carpal tunnel    HX ORTHOPAEDIC  2013    right knee arthroscopy    HX ORTHOPAEDIC Right 3/2015    trigger thumb release    HX ORTHOPAEDIC  10/8/2012    ALIF/ PLIF with bone stimulator    HX ORTHOPAEDIC  10/23/2012    Back surgery to adjust hardware    HX ORTHOPAEDIC  2018    ALIF - fusion L2-L3    HX ORTHOPAEDIC  2019    Back surgery    HX OTHER SURGICAL  10/08/2012    [facility] Bone Growth Stimulator    HX TONSIL AND ADENOIDECTOMY      HX TOTAL ABDOMINAL HYSTERECTOMY  1992    fibroids; Dr. Brissa Espinal     OB History    Para Term  AB Living   0             SAB TAB Ectopic Molar Multiple Live Births                 Obstetric Comments   Menarche:  4. LMP: 32.  # of Children:  0. Age at Delivery of First Child:  n/a. Hysterectomy/oophorectomy:  YES/NO. Breast Bx:  Yes left . Hx of Breast Feeding:  n/a.   BCP: no. Hormone therapy:  no.        Current Outpatient Medications   Medication Sig Dispense Refill    losartan (COZAAR) 50 mg tablet       Estradiol (DivigeL) 0.5 mg/0.5 gram (0.1 %) glpk 1 Packet by TransDERmal route daily. Apply 1 packet to thigh daily 90 Packet 3    dexlansoprazole (DEXILANT) 60 mg CpDB capsule (delayed release)       furosemide (LASIX) 20 mg tablet Lasix 20 mg tablet      pregabalin (LYRICA) 150 mg capsule Take  by mouth.  fluticasone-salmeterol (ADVAIR DISKUS) 250-50 mcg/dose diskus inhaler Take 1 Puff by inhalation every twelve (12) hours.  albuterol (PROVENTIL HFA, VENTOLIN HFA, PROAIR HFA) 90 mcg/actuation inhaler Take 2 Puffs by inhalation every six (6) hours as needed for Wheezing.  cyanocobalamin 1,000 mcg tablet Take 1,000 mcg by mouth daily.  cycloSPORINE (RESTASIS) 0.05 % ophthalmic emulsion Administer 1 Drop to both eyes two (2) times a day.  folic acid (FOLVITE) 1 mg tablet Take 2 mg by mouth daily.  gemfibrozil (LOPID) 600 mg tablet Take 600 mg by mouth Before breakfast and dinner.  hydroxychloroquine (PLAQUENIL) 200 mg tablet Take 200 mg by mouth two (2) times a day.  cholecalciferol, vitamin D3, (VITAMIN D3) 2,000 unit tab Take 2,000 Units by mouth daily.  sodium chloride 1 gram tablet Take 1 g by mouth daily.  therapeutic multivitamin (THERAGRAN) tablet Take 1 Tab by mouth daily.  pyridoxine, vitamin B6, (VITAMIN B-6) 100 mg tablet Take 100 mg by mouth daily.        Allergies: Penicillins and Adhesive tape-silicones   Social History     Socioeconomic History    Marital status:      Spouse name: Not on file    Number of children: Not on file    Years of education: Not on file    Highest education level: Not on file   Occupational History    Not on file   Social Needs    Financial resource strain: Not on file    Food insecurity     Worry: Not on file     Inability: Not on file   [employer] needs Medical: Not on file     Non-medical: Not on file   Tobacco Use    Smoking status: Former Smoker     Packs/day: 2.00     Years: 32.00     Pack years: 64.00     Last attempt to quit: 2012     Years since quittin.2    Smokeless tobacco: Never Used    Tobacco comment: Never used vapor or e-cigs    Substance and Sexual Activity    Alcohol use: No     Alcohol/week: 0.0 standard drinks     Comment: Sober 4 years    Drug use: No    Sexual activity: Not Currently     Birth control/protection: Surgical   Lifestyle    Physical activity     Days per week: Not on file     Minutes per session: Not on file    Stress: Not on file   Relationships    Social connections     Talks on phone: Not on file     Gets together: Not on file     Attends Taoism service: Not on file     Active member of club or organization: Not on file     Attends meetings of clubs or organizations: Not on file     Relationship status: Not on file    Intimate partner violence     Fear of current or ex partner: Not on file     Emotionally abused: Not on file     Physically abused: Not on file     Forced sexual activity: Not on file   Other Topics Concern    Not on file   Social History Narrative    Not on file     Tobacco History:  reports that she quit smoking about 8 years ago. She has a 64.00 pack-year smoking history. She has never used smokeless tobacco.  Alcohol Abuse:  reports no history of alcohol use. Drug Abuse:  reports no history of drug use.     Patient Active Problem List   Diagnosis Code    Lupus ([facility] M32.9    Murmur, cardiac R01.1    Hypertriglyceridemia E78.1    Hyperlipemia E78.5    Tobacco abuse Z72.0    GERD (gastroesophageal reflux disease) K21.9    Asthma J45.909    Hx of seizure disorder Z86.69    Hypertension I10    ETOH abuse F10.10    DJD (degenerative joint disease) M19.90    DDD (degenerative disc disease), lumbar M51.36    Neuropathic pain M79.2    Hyponatremia E87.1    Acute posthemorrhagic anemia D62    Cervical stenosis of spine M48.02     Family History   Problem Relation Age of Onset    Hypertension Mother     Stroke Mother         TIA    Arthritis-osteo Father         RA    No Known Problems Sister     No Known Problems Brother     No Known Problems Sister     No Known Problems Sister        Review of Systems - History obtained from the patient  Constitutional: negative for weight loss, fever, night sweats  HEENT: negative for hearing loss, earache, congestion, snoring, sorethroat  CV: negative for chest pain, palpitations, edema  Resp: negative for cough, shortness of breath, wheezing  GI: negative for change in bowel habits, abdominal pain, black or bloody stools  : negative for frequency, dysuria, hematuria, vaginal discharge  MSK: negative for back pain, joint pain, muscle pain  Breast: negative for breast lumps, nipple discharge, galactorrhea  Skin :negative for itching, rash, hives  Neuro: negative for dizziness, headache, confusion, weakness  Psych: negative for anxiety, depression, change in mood  Heme/lymph: negative for bleeding, bruising, pallor    Physical Exam    Visit Vitals  BP (!) 165/80   Wt 189 lb (85.7 kg)   BMI 32.44 kg/m²       Constitutional  · Appearance: well-nourished, well developed, alert, in no acute distress    HENT  · Head and Face: appears normal    Neck  · Inspection/Palpation: normal appearance, no masses or tenderness  · Lymph Nodes: no lymphadenopathy present  · Thyroid: gland size normal, nontender, no nodules or masses present on palpation    Chest  · Respiratory Effort: breathing unlabored  · Auscultation: normal breath sounds    Cardiovascular  · Heart:  · Auscultation: regular rate and rhythm without murmur    Breasts  · Inspection of Breasts: breasts symmetrical, no skin changes, no discharge present, nipple appearance normal, no skin retraction present  · Palpation of Breasts and Axillae: no masses present on palpation, no breast tenderness  · Axillary Lymph Nodes: no lymphadenopathy present    Gastrointestinal  · Abdominal Examination: abdomen non-tender to palpation, normal bowel sounds, no masses present  · Liver and spleen: no hepatomegaly present, spleen not palpable  · Hernias: no hernias identified    Genitourinary  · External Genitalia:no discharge present, no tenderness present, no inflammatory lesions present, no masses present, atrophy present; tissue at forchette tight, almost band-like, tears slightly with minimal manipulation. · Vagina: normal vaginal vault without central or paravaginal defects, no discharge present, no inflammatory lesions present, no masses present  · Bladder: non-tender to palpation  · Urethra: appears normal  · Cervix: absent   · Uterus: absent  · Adnexa: no adnexal tenderness present, no adnexal masses present  · Perineum: perineum within normal limits, no evidence of trauma, no rashes or skin lesions present  · Anus: anus within normal limits, no hemorrhoids present  · Inguinal Lymph Nodes: no lymphadenopathy present    Skin  · General Inspection: no rash, no lesions identified    Neurologic/Psychiatric  · Mental Status:  · Orientation: grossly oriented to person, place and time  · Mood and Affect: mood normal, affect appropriate      Assessment & Plan:  · Routine gynecologic examination. Pap today  · On Divigel. Will decrease to 0.5mg  · Urinary and fecal incontinence -> refer to urogynecology @ [facility], contact info given. · Vulvar atrophy, with band-like tissue at forchette. Some tearing of skin with minimal manipulation. May consider bx. · Counseled re: diet, exercise, healthy lifestyle  · Return for yearly wellness visits  · Rec annual mammogram  · Patient verbalized understanding           Orders Placed This Encounter    Estradiol (DivigeL) 0.5 mg/0.5 gram (0.1 %) glpk     Si Packet by TransDERmal route daily.  Apply 1 packet to thigh daily     Dispense:  90 Packet     Refill:  3    PAP IG, APTIMA HPV AND RFX [id number] ([id number])     Order Specific Question:   Pap Source? Answer:   Cervical and Endocervical     Order Specific Question:   Total Hysterectomy? Answer:   No     Order Specific Question:   Supracervical Hysterectomy? Answer:   No     Order Specific Question:   Post Menopausal?     Answer:   Yes     Order Specific Question:   Hormone Therapy? Answer:   No     Order Specific Question:   IUD? Answer:   No     Order Specific Question:   Abnormal Bleeding? Answer:   No     Order Specific Question:   Pregnant     Answer:   No     Order Specific Question:   Post Partum? Answer:    No

## 2020-10-15 NOTE — PATIENT INSTRUCTIONS
Well Visit, Women 48 to 72: Care Instructions Your Care Instructions Physical exams can help you stay healthy. Your doctor has checked your overall health and may have suggested ways to take good care of yourself. He or she also may have recommended tests. At home, you can help prevent illness with healthy eating, regular exercise, and other steps. Follow-up care is a key part of your treatment and safety. Be sure to make and go to all appointments, and call your doctor if you are having problems. It's also a good idea to know your test results and keep a list of the medicines you take. How can you care for yourself at home? · Reach and stay at a healthy weight. This will lower your risk for many problems, such as obesity, diabetes, heart disease, and high blood pressure. · Get at least 30 minutes of exercise on most days of the week. Walking is a good choice. You also may want to do other activities, such as running, swimming, cycling, or playing tennis or team sports. · Do not smoke. Smoking can make health problems worse. If you need help quitting, talk to your doctor about stop-smoking programs and medicines. These can increase your chances of quitting for good. · Protect your skin from too much sun. When you're outdoors from 10 a.m. to 4 p.m., stay in the shade or cover up with clothing and a hat with a wide brim. Wear sunglasses that block UV rays. Even when it's cloudy, put broad-spectrum sunscreen (SPF 30 or higher) on any exposed skin. · See a dentist one or two times a year for checkups and to have your teeth cleaned. · Wear a seat belt in the car. Follow your doctor's advice about when to have certain tests. These tests can spot problems early. · Cholesterol. Your doctor will tell you how often to have this done based on your age, family history, or other things that can increase your risk for heart attack and stroke. · Blood pressure. Have your blood pressure checked during a routine doctor visit. Your doctor will tell you how often to check your blood pressure based on your age, your blood pressure results, and other factors. · Mammogram. Ask your doctor how often you should have a mammogram, which is an X-ray of your breasts. A mammogram can spot breast cancer before it can be felt and when it is easiest to treat. · Pap test and pelvic exam. Ask your doctor how often you should have a Pap test. You may not need to have a Pap test as often as you used to. · Vision. Have your eyes checked every year or two or as often as your doctor suggests. Some experts recommend that you have yearly exams for glaucoma and other age-related eye problems starting at age 48. · Hearing. Tell your doctor if you notice any change in your hearing. You can have tests to find out how well you hear. · Diabetes. Ask your doctor whether you should have tests for diabetes. · Colorectal cancer. Your risk for colorectal cancer gets higher as you get older. Some experts say that adults should start regular screening at age 48 and stop at age 76. Others say to start before age 48 or continue after age 76. Talk with your doctor about your risk and when to start and stop screening. · Thyroid disease. Talk to your doctor about whether to have your thyroid checked as part of a regular physical exam. Women have an increased chance of a thyroid problem. · Osteoporosis. You should begin tests for bone density at age 72. If you are younger than 72, ask your doctor whether you have factors that may increase your risk for this disease. You may want to have this test before age 72. · Heart attack and stroke risk. At least every 4 to 6 years, you should have your risk for heart attack and stroke assessed.  Your doctor uses factors such as your age, blood pressure, cholesterol, and whether you smoke or have diabetes to show what your risk for a heart attack or stroke is over the next 10 years. When should you call for help? Watch closely for changes in your health, and be sure to contact your doctor if you have any problems or symptoms that concern you. Where can you learn more? Go to http://www.gray.com/ Enter G929 in the search box to learn more about \"Well Visit, Women 50 to 72: Care Instructions. \" Current as of: May 27, 2020               Content Version: 12.6 © 7651-8232 [employer]. Care instructions adapted under license by [employer] (which disclaims liability or warranty for this information). If you have questions about a medical condition or this instruction, always ask your healthcare professional. [address] any warranty or liability for your use of this information.

## 2020-10-16 ENCOUNTER — OFFICE VISIT (OUTPATIENT)
Dept: OBGYN CLINIC | Age: 60
End: 2020-10-16
Payer: COMMERCIAL

## 2020-10-16 VITALS — SYSTOLIC BLOOD PRESSURE: 165 MMHG | WEIGHT: 189 LBS | BODY MASS INDEX: 32.44 KG/M2 | DIASTOLIC BLOOD PRESSURE: 80 MMHG

## 2020-10-16 DIAGNOSIS — R15.9 INCONTINENCE OF FECES, UNSPECIFIED FECAL INCONTINENCE TYPE: ICD-10-CM

## 2020-10-16 DIAGNOSIS — R32 URINARY INCONTINENCE, UNSPECIFIED TYPE: ICD-10-CM

## 2020-10-16 DIAGNOSIS — Z01.419 ENCOUNTER FOR WELL WOMAN EXAM: Primary | ICD-10-CM

## 2020-10-16 DIAGNOSIS — N90.5 VULVAR ATROPHY: ICD-10-CM

## 2020-10-16 PROCEDURE — 99396 PREV VISIT EST AGE 40-64: CPT | Performed by: OBSTETRICS & GYNECOLOGY

## 2020-10-16 RX ORDER — ESTRADIOL 0.5 MG/.5G
1 GEL TOPICAL DAILY
Qty: 90 PACKET | Refills: 3 | Status: SHIPPED | OUTPATIENT
Start: 2020-10-16 | End: 2021-10-07

## 2020-10-16 RX ORDER — LOSARTAN POTASSIUM 50 MG/1
TABLET ORAL
COMMUNITY
Start: 2020-09-21 | End: 2021-10-07

## 2020-10-23 LAB
CYTOLOGIST CVX/VAG CYTO: NORMAL
CYTOLOGY CVX/VAG DOC CYTO: NORMAL
CYTOLOGY CVX/VAG DOC THIN PREP: NORMAL
CYTOLOGY HISTORY:: NORMAL
DX ICD CODE: NORMAL
HPV I/H RISK 4 DNA CVX QL PROBE+SIG AMP: NEGATIVE
Lab: NORMAL
OTHER STN SPEC: NORMAL
STAT OF ADQ CVX/VAG CYTO-IMP: NORMAL

## 2020-10-26 ENCOUNTER — HOSPITAL ENCOUNTER (OUTPATIENT)
Dept: PREADMISSION TESTING | Age: 60
Discharge: HOME | End: 2020-10-26
Payer: COMMERCIAL

## 2020-10-26 PROBLEM — Z12.4 ROUTINE PAPANICOLAOU SMEAR: Status: ACTIVE | Noted: 2020-10-26

## 2020-10-26 PROCEDURE — 87635 SARS-COV-2 COVID-19 AMP PRB: CPT

## 2020-10-27 LAB — SARS-COV-2, COV2NT: NOT DETECTED

## 2020-10-29 ENCOUNTER — ANESTHESIA EVENT (OUTPATIENT)
Dept: SURGERY | Age: 60
End: 2020-10-29
Payer: COMMERCIAL

## 2020-10-29 NOTE — PERIOP NOTES
Left a VM zeny Sheehan at Dr. Jiménez Yousif office requesting that the orders for surgery be faxed to PAT if before 1500 or to the [facility] pre-op if after 1500 today.   DOS: 10/30/2020

## 2020-10-30 ENCOUNTER — APPOINTMENT (OUTPATIENT)
Dept: GENERAL RADIOLOGY | Age: 60
End: 2020-10-30
Attending: ORTHOPAEDIC SURGERY
Payer: COMMERCIAL

## 2020-10-30 ENCOUNTER — ANESTHESIA (OUTPATIENT)
Dept: SURGERY | Age: 60
End: 2020-10-30
Payer: COMMERCIAL

## 2020-10-30 ENCOUNTER — HOSPITAL ENCOUNTER (OUTPATIENT)
Age: 60
Setting detail: OUTPATIENT SURGERY
Discharge: HOME | End: 2020-10-30
Attending: ORTHOPAEDIC SURGERY | Admitting: ORTHOPAEDIC SURGERY
Payer: COMMERCIAL

## 2020-10-30 VITALS
HEART RATE: 63 BPM | RESPIRATION RATE: 16 BRPM | TEMPERATURE: 98.4 F | WEIGHT: 191.8 LBS | HEIGHT: 64 IN | OXYGEN SATURATION: 100 % | SYSTOLIC BLOOD PRESSURE: 167 MMHG | BODY MASS INDEX: 32.74 KG/M2 | DIASTOLIC BLOOD PRESSURE: 79 MMHG

## 2020-10-30 DIAGNOSIS — M65.312 TRIGGER THUMB, LEFT THUMB: ICD-10-CM

## 2020-10-30 DIAGNOSIS — M19.042 DEGENERATIVE ARTHRITIS OF METACARPOPHALANGEAL JOINT OF LEFT THUMB: Primary | ICD-10-CM

## 2020-10-30 DIAGNOSIS — M65.4 DE QUERVAIN'S TENOSYNOVITIS, LEFT: ICD-10-CM

## 2020-10-30 LAB
ANION GAP SERPL CALC-SCNC: 3 MMOL/L (ref 5–15)
BASOPHILS # BLD: 0.1 K/UL (ref 0–0.1)
BASOPHILS NFR BLD: 2 % (ref 0–1)
BUN SERPL-MCNC: 14 MG/DL (ref 6–20)
BUN/CREAT SERPL: 17 (ref 12–20)
CALCIUM SERPL-MCNC: 8.6 MG/DL (ref 8.5–10.1)
CHLORIDE SERPL-SCNC: 105 MMOL/L (ref 97–108)
CO2 SERPL-SCNC: 27 MMOL/L (ref 21–32)
CREAT SERPL-MCNC: 0.84 MG/DL (ref 0.55–1.02)
DIFFERENTIAL METHOD BLD: ABNORMAL
EOSINOPHIL # BLD: 0.1 K/UL (ref 0–0.4)
EOSINOPHIL NFR BLD: 2 % (ref 0–7)
ERYTHROCYTE [DISTWIDTH] IN BLOOD BY AUTOMATED COUNT: 19.1 % (ref 11.5–14.5)
GLUCOSE SERPL-MCNC: 100 MG/DL (ref 65–100)
HCT VFR BLD AUTO: 34.7 % (ref 35–47)
HGB BLD-MCNC: 11 G/DL (ref 11.5–16)
IMM GRANULOCYTES # BLD AUTO: 0 K/UL (ref 0–0.04)
IMM GRANULOCYTES NFR BLD AUTO: 0 % (ref 0–0.5)
LYMPHOCYTES # BLD: 1.5 K/UL (ref 0.8–3.5)
LYMPHOCYTES NFR BLD: 24 % (ref 12–49)
MCH RBC QN AUTO: 27.6 PG (ref 26–34)
MCHC RBC AUTO-ENTMCNC: 31.7 G/DL (ref 30–36.5)
MCV RBC AUTO: 87 FL (ref 80–99)
MONOCYTES # BLD: 0.5 K/UL (ref 0–1)
MONOCYTES NFR BLD: 9 % (ref 5–13)
NEUTS SEG # BLD: 3.9 K/UL (ref 1.8–8)
NEUTS SEG NFR BLD: 63 % (ref 32–75)
NRBC # BLD: 0 K/UL (ref 0–0.01)
NRBC BLD-RTO: 0 PER 100 WBC
PLATELET # BLD AUTO: 300 K/UL (ref 150–400)
PMV BLD AUTO: 9.4 FL (ref 8.9–12.9)
POTASSIUM SERPL-SCNC: 4.2 MMOL/L (ref 3.5–5.1)
RBC # BLD AUTO: 3.99 M/UL (ref 3.8–5.2)
SODIUM SERPL-SCNC: 135 MMOL/L (ref 136–145)
WBC # BLD AUTO: 6.1 K/UL (ref 3.6–11)

## 2020-10-30 PROCEDURE — 74011250636 HC RX REV CODE- 250/636: Performed by: NURSE ANESTHETIST, CERTIFIED REGISTERED

## 2020-10-30 PROCEDURE — 76210000021 HC REC RM PH II 0.5 TO 1 HR: Performed by: ORTHOPAEDIC SURGERY

## 2020-10-30 PROCEDURE — 77030040361 HC SLV COMPR DVT MDII -B

## 2020-10-30 PROCEDURE — C1713 ANCHOR/SCREW BN/BN,TIS/BN: HCPCS | Performed by: ORTHOPAEDIC SURGERY

## 2020-10-30 PROCEDURE — 85025 COMPLETE CBC W/AUTO DIFF WBC: CPT

## 2020-10-30 PROCEDURE — 77030006689 HC BLD OPHTH BVR BD -A: Performed by: ORTHOPAEDIC SURGERY

## 2020-10-30 PROCEDURE — 77030018836 HC SOL IRR NACL ICUM -A: Performed by: ORTHOPAEDIC SURGERY

## 2020-10-30 PROCEDURE — 80048 BASIC METABOLIC PNL TOTAL CA: CPT

## 2020-10-30 PROCEDURE — 77030002933 HC SUT MCRYL J&J -A: Performed by: ORTHOPAEDIC SURGERY

## 2020-10-30 PROCEDURE — C1769 GUIDE WIRE: HCPCS | Performed by: ORTHOPAEDIC SURGERY

## 2020-10-30 PROCEDURE — 77030040922 HC BLNKT HYPOTHRM STRY -A

## 2020-10-30 PROCEDURE — 74011250636 HC RX REV CODE- 250/636: Performed by: ANESTHESIOLOGY

## 2020-10-30 PROCEDURE — 77030002916 HC SUT ETHLN J&J -A: Performed by: ORTHOPAEDIC SURGERY

## 2020-10-30 PROCEDURE — 77030031139 HC SUT VCRL2 J&J -A: Performed by: ORTHOPAEDIC SURGERY

## 2020-10-30 PROCEDURE — 2709999900 HC NON-CHARGEABLE SUPPLY: Performed by: ORTHOPAEDIC SURGERY

## 2020-10-30 PROCEDURE — 77030003737 HC BIT DRL ACMD -C: Performed by: ORTHOPAEDIC SURGERY

## 2020-10-30 PROCEDURE — 74011250636 HC RX REV CODE- 250/636: Performed by: PHYSICIAN ASSISTANT

## 2020-10-30 PROCEDURE — 77030003601 HC NDL NRV BLK BBMI -A

## 2020-10-30 PROCEDURE — 76060000035 HC ANESTHESIA 2 TO 2.5 HR: Performed by: ORTHOPAEDIC SURGERY

## 2020-10-30 PROCEDURE — 36415 COLL VENOUS BLD VENIPUNCTURE: CPT

## 2020-10-30 PROCEDURE — 64450 NJX AA&/STRD OTHER PN/BRANCH: CPT | Performed by: ANESTHESIOLOGY

## 2020-10-30 PROCEDURE — 76010000131 HC OR TIME 2 TO 2.5 HR: Performed by: ORTHOPAEDIC SURGERY

## 2020-10-30 PROCEDURE — 74011000250 HC RX REV CODE- 250: Performed by: NURSE ANESTHETIST, CERTIFIED REGISTERED

## 2020-10-30 PROCEDURE — 77030013940: Performed by: ORTHOPAEDIC SURGERY

## 2020-10-30 PROCEDURE — 74011000250 HC RX REV CODE- 250: Performed by: PHYSICIAN ASSISTANT

## 2020-10-30 PROCEDURE — 74011250636 HC RX REV CODE- 250/636

## 2020-10-30 PROCEDURE — 64415 NJX AA&/STRD BRCH PLXS IMG: CPT | Performed by: ANESTHESIOLOGY

## 2020-10-30 PROCEDURE — 77030000032 HC CUF TRNQT ZIMM -B: Performed by: ORTHOPAEDIC SURGERY

## 2020-10-30 PROCEDURE — 77030040356 HC CORD BPLR FRCP COVD -A: Performed by: ORTHOPAEDIC SURGERY

## 2020-10-30 DEVICE — 2.7MM X 8.0MM CRUCIFORM SCREW
Type: IMPLANTABLE DEVICE | Site: HAND | Status: FUNCTIONAL
Brand: ACUMED

## 2020-10-30 DEVICE — 2.7MM X 12.0MM CRUCIFORM SCREW
Type: IMPLANTABLE DEVICE | Site: HAND | Status: FUNCTIONAL
Brand: ACUMED

## 2020-10-30 DEVICE — 1ST MCP FUSION PLATE, LEFT
Type: IMPLANTABLE DEVICE | Site: HAND | Status: FUNCTIONAL
Brand: ACUMED

## 2020-10-30 RX ORDER — SODIUM CHLORIDE 0.9 % (FLUSH) 0.9 %
5-40 SYRINGE (ML) INJECTION AS NEEDED
Status: DISCONTINUED | OUTPATIENT
Start: 2020-10-30 | End: 2020-10-30 | Stop reason: HOSPADM

## 2020-10-30 RX ORDER — LIDOCAINE HYDROCHLORIDE 20 MG/ML
INJECTION, SOLUTION INFILTRATION; PERINEURAL AS NEEDED
Status: DISCONTINUED | OUTPATIENT
Start: 2020-10-30 | End: 2020-10-30 | Stop reason: HOSPADM

## 2020-10-30 RX ORDER — SODIUM CHLORIDE 0.9 % (FLUSH) 0.9 %
5-40 SYRINGE (ML) INJECTION EVERY 8 HOURS
Status: DISCONTINUED | OUTPATIENT
Start: 2020-10-30 | End: 2020-10-30 | Stop reason: HOSPADM

## 2020-10-30 RX ORDER — MIDAZOLAM HYDROCHLORIDE 1 MG/ML
INJECTION, SOLUTION INTRAMUSCULAR; INTRAVENOUS AS NEEDED
Status: DISCONTINUED | OUTPATIENT
Start: 2020-10-30 | End: 2020-10-30 | Stop reason: HOSPADM

## 2020-10-30 RX ORDER — ALBUTEROL SULFATE 0.83 MG/ML
2.5 SOLUTION RESPIRATORY (INHALATION) AS NEEDED
Status: DISCONTINUED | OUTPATIENT
Start: 2020-10-30 | End: 2020-10-30 | Stop reason: HOSPADM

## 2020-10-30 RX ORDER — FENTANYL CITRATE 50 UG/ML
INJECTION, SOLUTION INTRAMUSCULAR; INTRAVENOUS AS NEEDED
Status: DISCONTINUED | OUTPATIENT
Start: 2020-10-30 | End: 2020-10-30 | Stop reason: HOSPADM

## 2020-10-30 RX ORDER — LIDOCAINE HYDROCHLORIDE 10 MG/ML
0.1 INJECTION, SOLUTION EPIDURAL; INFILTRATION; INTRACAUDAL; PERINEURAL AS NEEDED
Status: DISCONTINUED | OUTPATIENT
Start: 2020-10-30 | End: 2020-10-30 | Stop reason: HOSPADM

## 2020-10-30 RX ORDER — ONDANSETRON 2 MG/ML
INJECTION INTRAMUSCULAR; INTRAVENOUS AS NEEDED
Status: DISCONTINUED | OUTPATIENT
Start: 2020-10-30 | End: 2020-10-30 | Stop reason: HOSPADM

## 2020-10-30 RX ORDER — PROPOFOL 10 MG/ML
INJECTION, EMULSION INTRAVENOUS
Status: DISCONTINUED | OUTPATIENT
Start: 2020-10-30 | End: 2020-10-30 | Stop reason: HOSPADM

## 2020-10-30 RX ORDER — FLUMAZENIL 0.1 MG/ML
0.2 INJECTION INTRAVENOUS
Status: DISCONTINUED | OUTPATIENT
Start: 2020-10-30 | End: 2020-10-30 | Stop reason: HOSPADM

## 2020-10-30 RX ORDER — HYDROMORPHONE HYDROCHLORIDE 1 MG/ML
.25-1 INJECTION, SOLUTION INTRAMUSCULAR; INTRAVENOUS; SUBCUTANEOUS
Status: DISCONTINUED | OUTPATIENT
Start: 2020-10-30 | End: 2020-10-30 | Stop reason: HOSPADM

## 2020-10-30 RX ORDER — SODIUM CHLORIDE, SODIUM LACTATE, POTASSIUM CHLORIDE, CALCIUM CHLORIDE 600; 310; 30; 20 MG/100ML; MG/100ML; MG/100ML; MG/100ML
125 INJECTION, SOLUTION INTRAVENOUS CONTINUOUS
Status: DISCONTINUED | OUTPATIENT
Start: 2020-10-30 | End: 2020-10-30 | Stop reason: HOSPADM

## 2020-10-30 RX ORDER — FENTANYL CITRATE 50 UG/ML
25 INJECTION, SOLUTION INTRAMUSCULAR; INTRAVENOUS
Status: DISCONTINUED | OUTPATIENT
Start: 2020-10-30 | End: 2020-10-30 | Stop reason: HOSPADM

## 2020-10-30 RX ORDER — ROPIVACAINE HYDROCHLORIDE 5 MG/ML
INJECTION, SOLUTION EPIDURAL; INFILTRATION; PERINEURAL AS NEEDED
Status: DISCONTINUED | OUTPATIENT
Start: 2020-10-30 | End: 2020-10-30 | Stop reason: HOSPADM

## 2020-10-30 RX ORDER — ONDANSETRON 8 MG/1
8 TABLET, ORALLY DISINTEGRATING ORAL
Qty: 10 TAB | Refills: 2 | Status: SHIPPED
Start: 2020-10-30 | End: 2021-10-07

## 2020-10-30 RX ORDER — ONDANSETRON 2 MG/ML
4 INJECTION INTRAMUSCULAR; INTRAVENOUS AS NEEDED
Status: DISCONTINUED | OUTPATIENT
Start: 2020-10-30 | End: 2020-10-30 | Stop reason: HOSPADM

## 2020-10-30 RX ORDER — NALOXONE HYDROCHLORIDE 0.4 MG/ML
0.04 INJECTION, SOLUTION INTRAMUSCULAR; INTRAVENOUS; SUBCUTANEOUS
Status: DISCONTINUED | OUTPATIENT
Start: 2020-10-30 | End: 2020-10-30 | Stop reason: HOSPADM

## 2020-10-30 RX ORDER — HYDROCODONE BITARTRATE AND ACETAMINOPHEN 5; 325 MG/1; MG/1
1 TABLET ORAL
Qty: 20 TAB | Refills: 0 | Status: SHIPPED
Start: 2020-10-30 | End: 2020-11-04

## 2020-10-30 RX ORDER — DIPHENHYDRAMINE HYDROCHLORIDE 50 MG/ML
12.5 INJECTION, SOLUTION INTRAMUSCULAR; INTRAVENOUS AS NEEDED
Status: DISCONTINUED | OUTPATIENT
Start: 2020-10-30 | End: 2020-10-30 | Stop reason: HOSPADM

## 2020-10-30 RX ADMIN — LIDOCAINE HYDROCHLORIDE 40 MG: 20 INJECTION, SOLUTION INFILTRATION; PERINEURAL at 14:53

## 2020-10-30 RX ADMIN — ONDANSETRON HYDROCHLORIDE 4 MG: 2 SOLUTION INTRAMUSCULAR; INTRAVENOUS at 16:44

## 2020-10-30 RX ADMIN — PROPOFOL 60 MCG/KG/MIN: 10 INJECTION, EMULSION INTRAVENOUS at 14:54

## 2020-10-30 RX ADMIN — SODIUM CHLORIDE, POTASSIUM CHLORIDE, SODIUM LACTATE AND CALCIUM CHLORIDE: 600; 310; 30; 20 INJECTION, SOLUTION INTRAVENOUS at 10:00

## 2020-10-30 RX ADMIN — ROPIVACAINE HYDROCHLORIDE 30 ML: 5 INJECTION, SOLUTION EPIDURAL; INFILTRATION; PERINEURAL at 13:42

## 2020-10-30 RX ADMIN — CEFAZOLIN SODIUM 2 G: 1 POWDER, FOR SOLUTION INTRAMUSCULAR; INTRAVENOUS at 14:55

## 2020-10-30 RX ADMIN — FENTANYL CITRATE 50 MCG: 0.05 INJECTION, SOLUTION INTRAMUSCULAR; INTRAVENOUS at 13:42

## 2020-10-30 RX ADMIN — MIDAZOLAM HYDROCHLORIDE 2 MG: 2 INJECTION, SOLUTION INTRAMUSCULAR; INTRAVENOUS at 13:37

## 2020-10-30 RX ADMIN — FENTANYL CITRATE 50 MCG: 0.05 INJECTION, SOLUTION INTRAMUSCULAR; INTRAVENOUS at 13:37

## 2020-10-30 NOTE — H&P
Orthopedic Admission History and Physical        NAME: Debi Figueroa       :  1960       MRN:  [id number]      Subjective:     Patient is a 56 y.o. female who presents with history of left thumb MCP degenerative arthritis, left wrist De Quervain's tenosynovitis, and left trigger thumb. Presents today for surgical treatment. Patient Active Problem List    Diagnosis Date Noted    Degenerative arthritis of metacarpophalangeal joint of left thumb 10/30/2020    De Quervain's tenosynovitis, left 10/30/2020    Trigger thumb, left thumb 10/30/2020    Routine Papanicolaou smear 10/26/2020    Cervical stenosis of spine 2015    Acute posthemorrhagic anemia 10/08/2012    Lupus (HCC)     Murmur, cardiac     Hypertriglyceridemia     Hyperlipemia     Tobacco abuse     GERD (gastroesophageal reflux disease)     Asthma     Hx of seizure disorder     Hypertension     ETOH abuse     DJD (degenerative joint disease)     DDD (degenerative disc disease), lumbar     Neuropathic pain     Hyponatremia      Past Medical History:   Diagnosis Date    Asthma     activity induced asthma     Autoimmune disease ([address]     mixed connective tissue disease    Cancer ([address]     skin-basal    Chronic pain     lower back, joints    DDD (degenerative disc disease), lumbar     Dense breast tissue on mammogram 10/02/2020    BI-RADS 1: Negative.     DJD (degenerative joint disease)     ETOH abuse     Sober 11    GERD (gastroesophageal reflux disease)     Hernia     Hx of bronchitis     Hx of mammogram 10/02/2019    Negative     Hx of seizure disorder 2010    unknown cause, none since    Hx of sinusitis     Hyperlipemia     Hypertension     Hypertriglyceridemia     Hyponatremia     Insomnia     Lupus ([address]     Murmur, cardiac     Neuropathic pain     Osteopenia     DEXA (), started on Fosamax per rheumatology    Other bursitis of hip, right hip 2016    Had a deep hip injection for pain  Routine Papanicolaou smear 09/20/2016    Negative (no hpv)     Stroke ([facility] 3/11    PCP states she had a TIA - patient denies this (was low Na)    Tobacco abuse     Stopped in 2012      Past Surgical History:   Procedure Laterality Date    HX CYST INCISION AND DRAINAGE Left 08/2016    HX HERNIA REPAIR  10/7/2014    incisional with mesh    HX LAP CHOLECYSTECTOMY  3/11/2014    HX LUMBAR FUSION      10/8/2012    HX LUMBAR LAMINECTOMY      rods in 1996, out 1998    HX LUMBAR LAMINECTOMY  02/2017   [facility]    back surgery    HX ORTHOPAEDIC Bilateral 2013 R, 2014 L    carpal tunnel    HX ORTHOPAEDIC  6/27/2013    right knee arthroscopy    HX ORTHOPAEDIC Right 3/2015    trigger thumb release    HX ORTHOPAEDIC  10/8/2012    ALIF/ PLIF with bone stimulator    HX ORTHOPAEDIC  10/23/2012    Back surgery to adjust hardware    HX ORTHOPAEDIC  01/2018    ALIF - fusion L2-L3    HX ORTHOPAEDIC  04/01/2019    Back surgery    HX OTHER SURGICAL  10/08/2012    [facility] Bone Growth Stimulator    HX TONSIL AND ADENOIDECTOMY  1962    HX TOTAL ABDOMINAL HYSTERECTOMY  12/20/1992    fibroids; Dr. Perez Pastcorey      Prior to Admission medications    Medication Sig Start Date End Date Taking? Authorizing Provider   HYDROcodone-acetaminophen (NORCO) 5-325 mg per tablet Take 1 Tab by mouth every four (4) hours as needed for Pain for up to 5 days. Max Daily Amount: 6 Tabs. 10/30/20 11/4/20 Yes Rebekah Jovel PA-C   ondansetron (Zofran ODT) 8 mg disintegrating tablet Take 1 Tab by mouth every eight (8) hours as needed for Nausea. 10/30/20  Yes Rebekah Jovel PA-C   docusate sodium (COLACE) 50 mg capsule Take 1 Cap by mouth two (2) times a day for 90 days. 10/30/20 1/28/21 Yes Rebekah Jovel PA-C   losartan (COZAAR) 50 mg tablet  9/21/20  Yes Provider, Historical   Estradiol (DivigeL) 0.5 mg/0.5 gram (0.1 %) glpk 1 Packet by TransDERmal route daily.  Apply 1 packet to thigh daily 10/16/20  Yes Kodak Ortiz MD   dexlansoprazole [facility]) 60 mg CpDB capsule (delayed release)  7/16/19  Yes Provider, Historical   furosemide (LASIX) 20 mg tablet Lasix 20 mg tablet 2/23/19  Yes Provider, Historical   pregabalin (LYRICA) 150 mg capsule Take  by mouth. Yes Provider, Historical   fluticasone-salmeterol (ADVAIR DISKUS) 250-50 mcg/dose diskus inhaler Take 1 Puff by inhalation every twelve (12) hours. Yes Provider, Historical   cyanocobalamin 1,000 mcg tablet Take 1,000 mcg by mouth daily. Yes Provider, Historical   cycloSPORINE (RESTASIS) 0.05 % ophthalmic emulsion Administer 1 Drop to both eyes two (2) times a day. Yes Provider, Historical   folic acid (FOLVITE) 1 mg tablet Take 2 mg by mouth daily. Yes Provider, Historical   gemfibrozil (LOPID) 600 mg tablet Take 600 mg by mouth Before breakfast and dinner. Yes Provider, Historical   hydroxychloroquine (PLAQUENIL) 200 mg tablet Take 200 mg by mouth two (2) times a day. Yes Provider, Historical   cholecalciferol, vitamin D3, (VITAMIN D3) 2,000 unit tab Take 2,000 Units by mouth daily. Yes Provider, Historical   sodium chloride 1 gram tablet Take 1 g by mouth daily. Yes Provider, Historical   therapeutic multivitamin (THERAGRAN) tablet Take 1 Tab by mouth daily. Yes Provider, Historical   pyridoxine, vitamin B6, (VITAMIN B-6) 100 mg tablet Take 100 mg by mouth daily. Yes Provider, Historical   albuterol (PROVENTIL HFA, VENTOLIN HFA, PROAIR HFA) 90 mcg/actuation inhaler Take 2 Puffs by inhalation every six (6) hours as needed for Wheezing.     Provider, Historical     Current Facility-Administered Medications   Medication Dose Route Frequency    lidocaine (PF) (XYLOCAINE) 10 mg/mL (1 %) injection 0.1 mL  0.1 mL SubCUTAneous PRN    lactated Ringers infusion  125 mL/hr IntraVENous CONTINUOUS    sodium chloride (NS) flush 5-40 mL  5-40 mL IntraVENous Q8H    sodium chloride (NS) flush 5-40 mL  5-40 mL IntraVENous PRN    naloxone (NARCAN) injection 0.04 mg  0.04 mg IntraVENous Multiple    flumazeniL (ROMAZICON) 0.1 mg/mL injection 0.2 mg  0.2 mg IntraVENous Multiple    ceFAZolin (ANCEF) 2 g in sterile water (preservative free) 20 mL IV syringe  2 g IntraVENous Q8H    ceFAZolin (ANCEF) 2 g in sterile water (preservative free) 20 mL IV syringe  2 g IntraVENous ONCE      Allergies   Allergen Reactions    Penicillins Rash     Fever. 16 Reports able to take Keflex without problem.  Tramadol Shortness of Breath    Adhesive Tape-Silicones Rash      Social History     Tobacco Use    Smoking status: Former Smoker     Packs/day: 2.00     Years: 32.00     Pack years: 64.00     Last attempt to quit: 2012     Years since quittin.3    Smokeless tobacco: Never Used    Tobacco comment: Never used vapor or e-cigs    Substance Use Topics    Alcohol use: No     Alcohol/week: 0.0 standard drinks     Comment: Sober 4 years      Family History   Problem Relation Age of Onset    Hypertension Mother     Stroke Mother         TIA    Arthritis-osteo Father         RA    No Known Problems Sister     No Known Problems Brother     No Known Problems Sister     No Known Problems Sister         Review of Systems  A comprehensive review of systems was negative except for that written in the HPI. Objective:     No data found. No data recorded. Physical Exam:  General appearance: alert, cooperative, no distress, appears stated age  Lungs: No use of accessory breathing muscles. Breathing unlabored. Cardiac: Regular rate. Abdomen: soft, non-tender, non-distended  Extremities: As per prior exam.     Labs: No results found for this or any previous visit (from the past 24 hour(s)). Assessment:   No medical contraindications to proceeding with planned surgery. Please see initial office note for full discussion of risks, benefits, and alternatives to surgery.     Patient Active Problem List    Diagnosis Date Noted    Degenerative arthritis of metacarpophalangeal joint of left thumb 10/30/2020    De Quervain's tenosynovitis, left 10/30/2020    Trigger thumb, left thumb 10/30/2020    Routine Papanicolaou smear 10/26/2020    Cervical stenosis of spine 05/11/2015    Acute posthemorrhagic anemia 10/08/2012    Lupus (HCC)     Murmur, cardiac     Hypertriglyceridemia     Hyperlipemia     Tobacco abuse     GERD (gastroesophageal reflux disease)     Asthma     Hx of seizure disorder     Hypertension     ETOH abuse     DJD (degenerative joint disease)     DDD (degenerative disc disease), lumbar     Neuropathic pain     Hyponatremia          Plan:   Proceed with surgery  Pt. stable  Pt.  NPO x meds

## 2020-10-30 NOTE — DISCHARGE INSTRUCTIONS
Upper Extremity Surgery Discharge Instructions  Dr. Darlyn Banks    Please take the time to review the following instructions before you leave the hospital and use them as guidelines during your recovery from surgery. If you have any questions, you may contact my office at [phone]. Wound Care / Dressing Change    Do NOT remove your dressing or get them wet. Scott Batter / Bathing    May bathe/shower as long as dressing/splint/cast is kept dry. Sling    You are not required to wear a sling and should do so only as needed for comfort. You may remove your sling once the block wears off, which may be anywhere from 8-48 hrs after surgery. Activity    Please begin using fingers immediately after surgery, working to improve motion of straightening and flexing your fingers several times per day. No lifting with your affected hand. Otherwise, you may proceed with activity as tolerated. No driving until you receive further notice otherwise. Ice and Elevation    Keep your hand elevated continuously for 48 hours after surgery using the pillow provided. Your hand/wrist should always be above the level of your heart. Sleep with your arm elevated to minimize swelling and discomfort. Continue ice consistently for 48 hours after surgery. After 48 hours, you should ice 3 times per day for 20 minutes at a time for the next 5 days. After 1 week from surgery, you may use ice as needed for pain. Diet    You may advance your regular diet as tolerated. Increase your clear liquid intake for the next 2-3 days. Medications  1. You will be given prescriptions for pain medication, and nausea when you are discharged from the hospital. Please use the medications as prescribed. Pain medications may cause constipation - over the counter Colace or Milk of Magnesia may be used as needed.  Other possible side effects of pain medications are dizziness, headache, nausea, vomiting, and urinary retention. Discontinue the pain medication if you develop itching, rash, shortness of breath, or difficulties swallowing. If these symptoms become severe or arent relieved by discontinuing the medication, you should seek immediate medical attention. 2. Refills of pain medication are authorized during office hours only (8AM - 5PM Monday through Friday)  3. If you pain medication prescribed at the time of surgery contains Tylenol/Acetaminophen, DO NOT TAKE additional Tylenol/Acetaminophen. Do not exceed 4000mg of Tylenol/Acetaminophen per day. 4. You may resume the medication you were taking prior to your surgery. Pain medication may change the effects of any antidepressant medication you may be taking. If you have any questions about possible interactions between your regular medication and the pain medication, you should consult the physician who prescribes your regular medications. 5. Do not drive until further notice. 6. You were prescribed a nausea medication. It is only necessary to fill this if you are experiencing nausea. Please call the office at [phone] if you have any increasing numbness or tingling, increasing drainage on your dressing, fever greater than 100.5 degrees F or pain not controlled by medications. If you are experiencing chest pain or shortness of breath, please alert your primary care physician immediately. DISCHARGE SUMMARY from your Nurse      PATIENT INSTRUCTIONS    After general anesthesia or intravenous sedation, for 24 hours or while taking prescription Narcotics:  · Limit your activities  · Do not drive and operate hazardous machinery  · Do not make important personal or business decisions  · Do  not drink alcoholic beverages  · If you have not urinated within 8 hours after discharge, please contact your surgeon on call.     Report the following to your surgeon:  · Excessive pain, swelling, redness or odor of or around the surgical area  · Temperature over 100.5  · Nausea and vomiting lasting longer than 4 hours or if unable to take medications  · Any signs of decreased circulation or nerve impairment to extremity: change in color, persistent  numbness, tingling, coldness or increase pain  · Any questions      GOOD HELP TO FIGHT AN INFECTION  Here are a few tip to help reduce the chance of getting an infection after surgery:   Wash Your Hands   Good handwashing is the most important thing you and your caregiver can do.  Wash before and after caring for any wounds. Dry your hand with a clean towel.  Wash with soap and water for at least 20 seconds. A TIP: sing the \"Happy Birthday\" song through one time while washing to help with the timing.  Use a hand  in between washings.  Shower   When your surgeon says it is OK to take a shower, use a new bar of antibacterial soap (if that is what you use, and keep that bar of soap ONLY for your use), or antibacterial body wash.  Use a clean wash cloth or sponge when you bathe.  Dry off with a clean towel  after every bath - be careful around any wounds, skin staples, sutures or surgical glue over/on wounds.  Do not enter swimming pools, hot tubs, lakes, rivers and/or ocean until wounds are healed and your doctor/surgeon says it is OK.  Use Clean Sheets   Sleep on freshly laundered sheets after your surgery.  Keep the surgery site covered with a clean, dry bandage (if instructed to do so). If the bandage becomes soiled, reapply a new, dry, clean bandage.  Do not allow pets to sleep with you while your wound is healing.  Lifestyle Modification and Controlling Your Blood Sugar   Smoking slows wound healing. Stop smoking and limit exposure to second-hand smoke.  High blood sugar slows wound healing.   Eat a well-balanced diet to provide proper nutrition while healing   Monitor your blood sugar (if you are a diabetic) and take your medications as you are suppose to so you can control you blood sugar after surgery. COUGH AND DEEP BREATHE    Breathing deeply and coughing are very important exercises to do after surgery. Deep breathing and coughing open the little air tubes and air sacks in your lungs. You take deep breaths every day. You may not even notice - it is just something you do when you sigh or yawn. It is a natural exercise you do to keep these air passages open. After surgery, take deep breaths and cough, on purpose. DIRECTIONS:  · Take 10 to 15 slow deep breaths every hour while awake. · Breathe in deeply, and hold it for 2 seconds. · Exhale slowly through puckered lips, like blowing up a balloon. · After every 4th or 5th deep breath, hug your pillow to your chest or belly and give a hard, deep cough. Yes, it will probably hurt. But doing this exercise is a very important part of healing after surgery. Take your pain medicine to help you do this exercise without too much pain. Coughing and deep breathing help prevent bronchitis and pneumonia after surgery. If you had chest or belly surgery, use a pillow as a \"hug boubacar\" and hold it tightly to your chest or belly when you cough. ANKLE PUMPS    Ankle pumps increase the circulation of oxygenated blood to your lower extremities and decrease your risk for circulation problems such as blood clots. They also stretch the muscles, tendons and ligaments in your foot and ankle, and prevent joint contracture in the ankle and foot, especially after surgeries on the legs. It is important to do ankle pump exercises regularly after surgery because immobility increases your risk for developing a blood clot. Your doctor may also have you take an Aspirin for the next few days as well. If your doctor did not ask you to take an Aspirin, consult with him before starting Aspirin therapy on your own. The exercise is quite simple.      · Slowly point your foot forward, feeling the muscles on the top of your lower leg stretch, and hold this position for 5 seconds. · Next, pull your foot back toward you as far as possible, stretching the calf muscles, and hold that position for 5 seconds. · Repeat with the other foot. · Perform 10 repetitions every hour while awake for both ankles if possible (down and then up with the foot once is one repetition). You should feel gentle stretching of the muscles in your lower leg when doing this exercise. If you feel pain, or your range of motion is limited, don't push too hard. Only go the limit your joint and muscles will let you go. If you have increasing pain, progressively worsening leg warmth or swelling, STOP the exercise and call your doctor. MEDICATION AND   SIDE EFFECT GUIDE    The [facility] MEDICATION AND SIDE EFFECT GUIDE was provided to the PATIENT AND CARE PROVIDER. Information provided includes instruction about drug purpose and common side effects for the following medications:    -docusate sodium (COLACE)     -HYDROcodone-acetaminophen (NORCO)     -ondansetron (Zofran ODT)          These are general instructions for a healthy lifestyle:    *   Please give a list of your current medications to your Primary Care Provider. *   Please update this list whenever your medications are discontinued, doses are changed, or new medications (including over-the-counter products) are added. *   Please carry medication information at all times in case of emergency situations. About Smoking  No smoking / No tobacco products  Avoid exposure to second hand smoke     Surgeon General's Warning:  Quitting smoking now greatly reduces serious risk to your health. Obesity, smoking, and sedentary lifestyle greatly increases your risk for illness and disease. A healthy diet, regular physical exercise & weight monitoring are important for maintaining a healthy lifestyle.       Congestive Heart Failure  You may be retaining fluid if you have a history of heart failure or if you experience any of the following symptoms:  Weight gain of 3 pounds or more overnight or 5 pounds in a week, increased swelling in your hands or feet or shortness of breath while lying flat in bed. Please call your doctor as soon as you notice any of these symptoms; do not wait until your next office visit. Recognize signs and symptoms of STROKE:  F -  Face looks uneven  A -  Arms unable to move or move evenly  S -  Speech slurred or non-existent  T -  Time-call 911 as soon as signs and symptoms begin-DO NOT go          back to bed or wait to see if you get better-TIME IS BRAIN. Warning Signs of HEART ATTACK   Call 911 if you have these symptoms:     Chest discomfort. Most heart attacks involve discomfort in the center of the chest that lasts more than a few minutes, or that goes away and comes back. It can feel like uncomfortable pressure, squeezing, fullness, or pain.  Discomfort in other areas of the upper body. Symptoms can include pain or discomfort in one or both arms, the back, neck, jaw, or stomach.  Shortness of breath with or without chest discomfort.  Other signs may include breaking out in a cold sweat, nausea, or lightheadedness. Don't wait more than five minutes to call 911 - MINUTES MATTER! Fast action can save your life. Calling 911 is almost always the fastest way to get lifesaving treatment. Emergency Medical Services staff can begin treatment when they arrive -- up to an hour sooner than if someone gets to the hospital by car. Learning About Coronavirus [phone])  Coronavirus [phone]): Overview  What is coronavirus (COVID-19)? The coronavirus disease (COVID-19) is caused by a virus. It is an illness that was first found in [place], [place], in December 2019. It has since spread worldwide. The virus can cause fever, cough, and trouble breathing. In severe cases, it can cause pneumonia and make it hard to breathe without help.  It can cause death. Coronaviruses are a large group of viruses. They cause the common cold. They also cause more serious illnesses like [place] East respiratory syndrome (MERS) and severe acute respiratory syndrome (SARS). COVID-19 is caused by a novel coronavirus. That means it's a new type that has not been seen in people before. This virus spreads person-to-person through droplets from coughing and sneezing. It can also spread when you are close to someone who is infected. And it can spread when you touch something that has the virus on it, such as a doorknob or a tabletop. What can you do to protect yourself from coronavirus (COVID-19)? The best way to protect yourself from getting sick is to:  · Avoid areas where there is an outbreak. · Avoid contact with people who may be infected. · Wash your hands often with soap or alcohol-based hand sanitizers. · Avoid crowds and try to stay at least 6 feet away from other people. · Wash your hands often, especially after you cough or sneeze. Use soap and water, and scrub for at least 20 seconds. If soap and water aren't available, use an alcohol-based hand . · Avoid touching your mouth, nose, and eyes. What can you do to avoid spreading the virus to others? To help avoid spreading the virus to others:  · Cover your mouth with a tissue when you cough or sneeze. Then throw the tissue in the trash. · Use a disinfectant to clean things that you touch often. · Stay home if you are sick or have been exposed to the virus. Don't go to school, work, or public areas. And don't use public transportation. · If you are sick:  ? Leave your home only if you need to get medical care. But call the doctor's office first so they know you're coming. And wear a face mask, if you have one.  ? If you have a face mask, wear it whenever you're around other people. It can help stop the spread of the virus when you cough or sneeze. ? Clean and disinfect your home every day.  Use household  and disinfectant wipes or sprays. Take special care to clean things that you grab with your hands. These include doorknobs, remote controls, phones, and handles on your refrigerator and microwave. And don't forget countertops, tabletops, bathrooms, and computer keyboards. When to call for help  Call 911 anytime you think you may need emergency care. For example, call if:  · You have severe trouble breathing. (You can't talk at all.)  · You have constant chest pain or pressure. · You are severely dizzy or lightheaded. · You are confused or can't think clearly. · Your face and lips have a blue color. · You pass out (lose consciousness) or are very hard to wake up. Call your doctor now if you develop symptoms such as:  · Shortness of breath. · Fever. · Cough. If you need to get care, call ahead to the doctor's office for instructions before you go. Make sure you wear a face mask, if you have one, to prevent exposing other people to the virus. Where can you get the latest information? The following health organizations are tracking and studying this virus. Their websites contain the most up-to-date information. Matt Collier also learn what to do if you think you may have been exposed to the virus. · U.S. Centers for Disease Control and Prevention (CDC): The CDC provides updated news about the disease and travel advice. The website also tells you how to prevent the spread of infection. www.cdc.gov  · World Health Organization [facility]): WHO offers information about the virus outbreaks. WHO also has travel advice. www.who.int  Current as of: April 1, 2020               Content Version: 12.4  © 7631-1344 [employer].    Care instructions adapted under license by your healthcare professional. If you have questions about a medical condition or this instruction, always ask your healthcare professional. [address] any warranty or liability for your use of this information. The discharge information has been reviewed with the patient and spouse. Any questions and concerns from the patient and spouse have been addressed. The patient and spouse verbalized understanding. The following personal items collected during your admission are returned to you:   Dental Appliance: Dental Appliances: None  Vision:    Hearing Aid:    Jewelry: Jewelry: None  Clothing: Clothing: Socks, Undergarments, Footwear, Pants, Shirt  Other Valuables:  Other Valuables: Eyeglasses, Cell Phone  Valuables sent to safe:

## 2020-10-30 NOTE — BRIEF OP NOTE
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 Procedure(s):  LEFT THUMB ARTHRODESIS-METACARPOPHALANGEAL-LEFT, THUMB TRIGGER RELEASE, LEFT, DEQUERVAINS RELEASE    Surgeon(s):  Cristian Sierra MD    Surgical Assistant: Physician Assistant: Judith Vicente PA-C    Anesthesia: Regional     Estimated Blood Loss (mL): Minimal    Complications: None    Specimens: * No specimens in log *     Implants:   Implant Name Type Inv. Item Serial No.  Lot No. LRB No. Used Action   PLATE BNE L 1ST MP TI 4 H FUS PRECONTOURED POLISHED ROUNDED - SNA  PLATE BNE L 1ST MP TI 4 H FUS PRECONTOURED POLISHED ROUNDED NA [employer]WD NA Left 1 Implanted   SCREW BNE L12MM DIA2.7MM HND TI NONLOCKING CRUCFRM - SNA  SCREW BNE L12MM DIA2.7MM HND TI NONLOCKING CRUCFRM NA [employer]WD NA Left 1 Implanted   SCREW BNE L8MM [id number] HND CRUCFRM TI NONLOCKING - SNA  SCREW BNE L8MM [id number] HND CRUCFRM TI NONLOCKING NA [employer] NA Left 3 Implanted       Drains: * No LDAs found *    Findings: As above.     Electronically Signed by Yemi Dao PA-C on 10/30/2020 at 4:53 PM

## 2020-10-30 NOTE — ANESTHESIA PROCEDURE NOTES
Peripheral Block    Start time: 10/30/2020 1:37 PM  End time: 10/30/2020 1:42 PM  Performed by: Franci Castellanos MD  Authorized by: Franci Castellanos MD       Pre-procedure:    Indications: at surgeon's request and primary anesthetic    Preanesthetic Checklist: patient identified, risks and benefits discussed, site marked, timeout performed, anesthesia consent given and patient being monitored    Timeout Time: 13:37          Block Type:   Block Type:  Supraclavicular  Laterality:  Left  Monitoring:  Continuous pulse ox, frequent vital sign checks, heart rate, responsive to questions and oxygen  Injection Technique:  Single shot  Procedures: ultrasound guided    Patient Position: supine  Prep: chlorhexidine    Location:  Supraclavicular  Needle Type:  Stimuplex  Needle Gauge:  22 G  Needle Localization:  Anatomical landmarks and ultrasound guidance    Assessment:  Number of attempts:  1  Injection Assessment:  Incremental injection every 5 mL, local visualized surrounding nerve on ultrasound, negative aspiration for blood, no paresthesia and no intravascular symptoms  Patient tolerance:  Patient tolerated the procedure well with no immediate complications

## 2020-10-30 NOTE — OP NOTES
Operative Report    Preoperative Diagnosis:   1. Left trigger thumb  2. Left de Quervain tenosynovitis  3. Left thumb MCP arthritis     Postoperative Diagnosis:  Same     Procedure(s):   1. Left trigger thumb release  2. Left de Quervain release  3. Left thumb MCP arthrodesis with autograft    Surgeon: Yolanda Medina MD     Assistant: Bakari Queen PA-C    Explanation of necessity of assistant: An assistant was necessary for this case with assistance in retraction and positioning to achieve appropriate exposure during the case    Anesthesia:  Regional    Estimated Blood Loss:  Minimal    Specimens:  None     Findings:  Longitudinal tendinosis of the FPL and 1st dorsal compartment tendons. There was end-stage changes of the thumb MCP, particularly on the metacarpal head. Complications:  Minimal    Implants:  Acumed thumb MCP arthrodesis set    Indication for procedure: Dion Chacon is a 56 y.o. female who presented with pain that was refractory to non operative treatment associated with 1st dorsal compartment tendinosis, trigger thumb, and thumb MCP arthritis. As such, she elected to proceed with the above to address her symptoms. We discussed appropriate expectations postoperatively. We also discussed risks of surgery including risk of bleeding, infection, damage to surrounding tendons, ligaments, nerves and blood vessels, which may cause permanent sensory loss or weakness, persistent pain and stiffness,  failure to resolve symptoms, need for further surgery, and anesthesia risks. Specific risks of the surgery were also discussed including 30 heal, persistent pain, and hardware complications . The patient understands further that there can be no guarantees made regarding the outcomes of surgery. Informed consent was signed. Description of Procedure: The patient was seen and identified the pre anesthesia care unit. The operative site was marked.   Preoperative questions were invited and answered. The patient was evaluated by the anesthesia team a brachial plexus block was placed. Patient was then brought to the operative suite. The patient was then prepped and draped in usual fashion. A time-out was entertained confirming the appropriate site, side, procedure. Patient received Ancef prior to proceeding. Then, an Esmarch bandage was used to exsanguinate the limb, and a well-padded tourniquet was inflated to 250 mmHg. Next, longitudinal incision was made over the 1st dorsal compartment. Branch to the radial sensory nerve were identified and retracted, the 1st dorsal compartment was identified, and this was divided along its dorsal edge. There was no sub compartment for the EPB. The wrist was taken through passive range of motion, and there was no subluxation of the tendons. Next, attention turned to the trigger thumb, a transverse incision made at the MCP crease volarly. Digital neurovascular bundles were retracted, the A1 pulley was exposed, and this was released in its entirety. Next, a longitudinal incision was made over the dorsum of the MCP. The plane between the EPB and the EPL was identified, and the capsule was split along the axis of this plane. The collateral ligaments released off the metacarpal head to expose the capsule. Then, the Reamer for the proximal phalanx was inserted in a center center position using fluoroscopic guidance. .  Then, a size 12 Reamer was used to denude the cartilage. After an appropriate bony surface was created, this was further decorticated using a K-wire. Next, a wire was inserted into the metacarpal in retrograde fashion at a 25 degree angle. Then, a size 15 Reamer was used for this decorticating the bone. Reamings were saved for autograft. Then, the plate was secured distally with the assistance of fluoroscopic guidance using a pin tack. Then, this was secured using a cortical screw in the oblique hole.   This secured distally after the pin tack was removed. The osteotomy site was approximated nicely using fluoroscopic guidance, and the plate was secured proximally with 2 cortical screws with excellent compression of the osteotomy. Final fluoroscopic images were taken confirming appropriate alignment of the osteotomy and position of the hardware. Wounds were irrigated, local autograft that had been harvested from the reamers was packed at the osteotomy site, the capsule was closed over the MCP with 3-0 Vicryl. Tourniquet was let down, hemostasis was obtained, subcutaneous tissues dorsally were closed with 3-0 Vicryl, the skin wound was closed with Monocryl dorsally, and nylon in the volar incision. A bulky dressing as well as a dorsal thumb spica splint was applied. The patient was then allowed to emerge from anesthesia, and was brought to the PACU uneventfully. Postoperative plan:  Return in 10 days for wound check. X-ray of the thumb at that time. Transition to a removable brace for six weeks at that point.

## 2020-10-30 NOTE — ANESTHESIA POSTPROCEDURE EVALUATION
Procedure(s):  LEFT THUMB ARTHRODESIS-METACARPOPHALANGEAL-LEFT, THUMB TRIGGER RELEASE, LEFT, DEQUERVAINS RELEASE.    regional, MAC    Anesthesia Post Evaluation      Multimodal analgesia: multimodal analgesia not used between 6 hours prior to anesthesia start to PACU discharge  Patient location during evaluation: PACU  Patient participation: complete - patient participated  Level of consciousness: awake  Pain management: adequate  Airway patency: patent  Anesthetic complications: no  Cardiovascular status: acceptable, blood pressure returned to baseline and hemodynamically stable  Respiratory status: acceptable  Hydration status: acceptable  Post anesthesia nausea and vomiting:  controlled      INITIAL Post-op Vital signs:   Vitals Value Taken Time   /78 10/30/2020  5:20 PM   Temp     Pulse 64 10/30/2020  5:21 PM   Resp 11 10/30/2020  5:21 PM   SpO2 98 % 10/30/2020  5:21 PM   Vitals shown include unvalidated device data.

## 2020-10-30 NOTE — ANESTHESIA PREPROCEDURE EVALUATION
Relevant Problems   No relevant active problems       Anesthetic History   No history of anesthetic complications            Review of Systems / Medical History  Patient summary reviewed and pertinent labs reviewed    Pulmonary          Smoker  Asthma        Neuro/Psych         TIA     Cardiovascular    Hypertension                   GI/Hepatic/Renal     GERD           Endo/Other        Arthritis     Other Findings   Comments: Connective tissue disorder  ?  Lupus           Physical Exam    Airway  Mallampati: II  TM Distance: 4 - 6 cm  Neck ROM: normal range of motion   Mouth opening: Normal     Cardiovascular  Regular rate and rhythm,  S1 and S2 normal,  no murmur, click, rub, or gallop  Rhythm: regular  Rate: normal         Dental    Dentition: Implants     Pulmonary  Breath sounds clear to auscultation               Abdominal  GI exam deferred       Other Findings            Anesthetic Plan    ASA: 3  Anesthesia type: regional and MAC - supraclavicular block      Post-op pain plan if not by surgeon: peripheral nerve block single    Induction: Intravenous  Anesthetic plan and risks discussed with: Patient

## 2020-12-18 NOTE — PERIOP NOTES
Spoke with Dr Juan M Lopez regarding NPO status for procedure on Monday, OK for patient to have clear liquids until 10:00 am.

## 2020-12-20 ENCOUNTER — ANESTHESIA EVENT (OUTPATIENT)
Dept: SURGERY | Age: 60
End: 2020-12-20
Payer: COMMERCIAL

## 2020-12-21 ENCOUNTER — HOSPITAL ENCOUNTER (OUTPATIENT)
Age: 60
Setting detail: OUTPATIENT SURGERY
Discharge: HOME | End: 2020-12-21
Attending: ORTHOPAEDIC SURGERY | Admitting: ORTHOPAEDIC SURGERY
Payer: COMMERCIAL

## 2020-12-21 ENCOUNTER — APPOINTMENT (OUTPATIENT)
Dept: GENERAL RADIOLOGY | Age: 60
End: 2020-12-21
Attending: ORTHOPAEDIC SURGERY
Payer: COMMERCIAL

## 2020-12-21 ENCOUNTER — ANESTHESIA (OUTPATIENT)
Dept: SURGERY | Age: 60
End: 2020-12-21
Payer: COMMERCIAL

## 2020-12-21 VITALS
HEART RATE: 64 BPM | SYSTOLIC BLOOD PRESSURE: 153 MMHG | BODY MASS INDEX: 32.9 KG/M2 | RESPIRATION RATE: 19 BRPM | DIASTOLIC BLOOD PRESSURE: 82 MMHG | OXYGEN SATURATION: 99 % | HEIGHT: 64 IN | WEIGHT: 192.68 LBS | TEMPERATURE: 97.8 F

## 2020-12-21 DIAGNOSIS — M19.042 DEGENERATIVE ARTHRITIS OF METACARPOPHALANGEAL JOINT OF LEFT THUMB: Primary | ICD-10-CM

## 2020-12-21 LAB
ALBUMIN SERPL-MCNC: 4 G/DL (ref 3.5–5)
ALBUMIN/GLOB SERPL: 1.3 {RATIO} (ref 1.1–2.2)
ALP SERPL-CCNC: 74 U/L (ref 45–117)
ALT SERPL-CCNC: 33 U/L (ref 12–78)
ANION GAP SERPL CALC-SCNC: 6 MMOL/L (ref 5–15)
AST SERPL-CCNC: 42 U/L (ref 15–37)
BASOPHILS # BLD: 0.1 K/UL (ref 0–0.1)
BASOPHILS NFR BLD: 2 % (ref 0–1)
BILIRUB SERPL-MCNC: 0.4 MG/DL (ref 0.2–1)
BUN SERPL-MCNC: 12 MG/DL (ref 6–20)
BUN/CREAT SERPL: 14 (ref 12–20)
CALCIUM SERPL-MCNC: 8.7 MG/DL (ref 8.5–10.1)
CHLORIDE SERPL-SCNC: 103 MMOL/L (ref 97–108)
CO2 SERPL-SCNC: 27 MMOL/L (ref 21–32)
CREAT SERPL-MCNC: 0.86 MG/DL (ref 0.55–1.02)
DIFFERENTIAL METHOD BLD: ABNORMAL
EOSINOPHIL # BLD: 0.2 K/UL (ref 0–0.4)
EOSINOPHIL NFR BLD: 4 % (ref 0–7)
ERYTHROCYTE [DISTWIDTH] IN BLOOD BY AUTOMATED COUNT: 18.1 % (ref 11.5–14.5)
ERYTHROCYTE [SEDIMENTATION RATE] IN BLOOD: 11 MM/HR (ref 0–30)
GLOBULIN SER CALC-MCNC: 3 G/DL (ref 2–4)
GLUCOSE SERPL-MCNC: 90 MG/DL (ref 65–100)
HCT VFR BLD AUTO: 35.4 % (ref 35–47)
HGB BLD-MCNC: 11.6 G/DL (ref 11.5–16)
IMM GRANULOCYTES # BLD AUTO: 0 K/UL (ref 0–0.04)
IMM GRANULOCYTES NFR BLD AUTO: 0 % (ref 0–0.5)
LYMPHOCYTES # BLD: 1.7 K/UL (ref 0.8–3.5)
LYMPHOCYTES NFR BLD: 37 % (ref 12–49)
MCH RBC QN AUTO: 28.6 PG (ref 26–34)
MCHC RBC AUTO-ENTMCNC: 32.8 G/DL (ref 30–36.5)
MCV RBC AUTO: 87.2 FL (ref 80–99)
MONOCYTES # BLD: 0.5 K/UL (ref 0–1)
MONOCYTES NFR BLD: 12 % (ref 5–13)
NEUTS SEG # BLD: 2.1 K/UL (ref 1.8–8)
NEUTS SEG NFR BLD: 45 % (ref 32–75)
NRBC # BLD: 0 K/UL (ref 0–0.01)
NRBC BLD-RTO: 0 PER 100 WBC
PLATELET # BLD AUTO: 316 K/UL (ref 150–400)
PMV BLD AUTO: 9.5 FL (ref 8.9–12.9)
POTASSIUM SERPL-SCNC: 3.8 MMOL/L (ref 3.5–5.1)
PROT SERPL-MCNC: 7 G/DL (ref 6.4–8.2)
RBC # BLD AUTO: 4.06 M/UL (ref 3.8–5.2)
SODIUM SERPL-SCNC: 136 MMOL/L (ref 136–145)
WBC # BLD AUTO: 4.6 K/UL (ref 3.6–11)

## 2020-12-21 PROCEDURE — 76060000062 HC AMB SURG ANES 1 TO 1.5 HR: Performed by: ORTHOPAEDIC SURGERY

## 2020-12-21 PROCEDURE — 74011250636 HC RX REV CODE- 250/636: Performed by: STUDENT IN AN ORGANIZED HEALTH CARE EDUCATION/TRAINING PROGRAM

## 2020-12-21 PROCEDURE — 74011250636 HC RX REV CODE- 250/636: Performed by: NURSE ANESTHETIST, CERTIFIED REGISTERED

## 2020-12-21 PROCEDURE — A4565 SLINGS: HCPCS

## 2020-12-21 PROCEDURE — 77030040356 HC CORD BPLR FRCP COVD -A: Performed by: ORTHOPAEDIC SURGERY

## 2020-12-21 PROCEDURE — 80053 COMPREHEN METABOLIC PANEL: CPT

## 2020-12-21 PROCEDURE — 77030034781 HC BIT DRL ACMD -D: Performed by: ORTHOPAEDIC SURGERY

## 2020-12-21 PROCEDURE — 77030018836 HC SOL IRR NACL ICUM -A: Performed by: ORTHOPAEDIC SURGERY

## 2020-12-21 PROCEDURE — 36415 COLL VENOUS BLD VENIPUNCTURE: CPT

## 2020-12-21 PROCEDURE — 87075 CULTR BACTERIA EXCEPT BLOOD: CPT

## 2020-12-21 PROCEDURE — 87205 SMEAR GRAM STAIN: CPT

## 2020-12-21 PROCEDURE — C1769 GUIDE WIRE: HCPCS | Performed by: ORTHOPAEDIC SURGERY

## 2020-12-21 PROCEDURE — 77030000032 HC CUF TRNQT ZIMM -B: Performed by: ORTHOPAEDIC SURGERY

## 2020-12-21 PROCEDURE — 77030031139 HC SUT VCRL2 J&J -A: Performed by: ORTHOPAEDIC SURGERY

## 2020-12-21 PROCEDURE — 74011250636 HC RX REV CODE- 250/636

## 2020-12-21 PROCEDURE — 64418 NJX AA&/STRD SPRSCAP NRV: CPT

## 2020-12-21 PROCEDURE — 77030021122 HC SPLNT MAT FST BSNM -A: Performed by: ORTHOPAEDIC SURGERY

## 2020-12-21 PROCEDURE — 74011250636 HC RX REV CODE- 250/636: Performed by: PHYSICIAN ASSISTANT

## 2020-12-21 PROCEDURE — 2709999900 HC NON-CHARGEABLE SUPPLY: Performed by: ORTHOPAEDIC SURGERY

## 2020-12-21 PROCEDURE — C1713 ANCHOR/SCREW BN/BN,TIS/BN: HCPCS | Performed by: ORTHOPAEDIC SURGERY

## 2020-12-21 PROCEDURE — 85025 COMPLETE CBC W/AUTO DIFF WBC: CPT

## 2020-12-21 PROCEDURE — 74011000250 HC RX REV CODE- 250: Performed by: PHYSICIAN ASSISTANT

## 2020-12-21 PROCEDURE — 87176 TISSUE HOMOGENIZATION CULTR: CPT

## 2020-12-21 PROCEDURE — 77030006689 HC BLD OPHTH BVR BD -A: Performed by: ORTHOPAEDIC SURGERY

## 2020-12-21 PROCEDURE — 77030003601 HC NDL NRV BLK BBMI -A

## 2020-12-21 PROCEDURE — 77030040361 HC SLV COMPR DVT MDII -B

## 2020-12-21 PROCEDURE — 77030040922 HC BLNKT HYPOTHRM STRY -A

## 2020-12-21 PROCEDURE — 77030002933 HC SUT MCRYL J&J -A: Performed by: ORTHOPAEDIC SURGERY

## 2020-12-21 PROCEDURE — 74011250636 HC RX REV CODE- 250/636: Performed by: ANESTHESIOLOGY

## 2020-12-21 PROCEDURE — 76030000001 HC AMB SURG OR TIME 1 TO 1.5: Performed by: ORTHOPAEDIC SURGERY

## 2020-12-21 PROCEDURE — 85652 RBC SED RATE AUTOMATED: CPT

## 2020-12-21 PROCEDURE — 76210000046 HC AMBSU PH II REC FIRST 0.5 HR: Performed by: ORTHOPAEDIC SURGERY

## 2020-12-21 PROCEDURE — 74011000250 HC RX REV CODE- 250: Performed by: NURSE ANESTHETIST, CERTIFIED REGISTERED

## 2020-12-21 PROCEDURE — 77030002916 HC SUT ETHLN J&J -A: Performed by: ORTHOPAEDIC SURGERY

## 2020-12-21 DEVICE — 26.0MM, STANDARD ACUTRAK 2® BONE SCREW
Type: IMPLANTABLE DEVICE | Site: THUMB | Status: FUNCTIONAL
Brand: ACUMED

## 2020-12-21 RX ORDER — ONDANSETRON 2 MG/ML
INJECTION INTRAMUSCULAR; INTRAVENOUS AS NEEDED
Status: DISCONTINUED | OUTPATIENT
Start: 2020-12-21 | End: 2020-12-21 | Stop reason: HOSPADM

## 2020-12-21 RX ORDER — HYDROCODONE BITARTRATE AND ACETAMINOPHEN 5; 325 MG/1; MG/1
1 TABLET ORAL
Qty: 30 TAB | Refills: 0 | Status: SHIPPED | OUTPATIENT
Start: 2020-12-21 | End: 2020-12-26

## 2020-12-21 RX ORDER — FLUMAZENIL 0.1 MG/ML
0.2 INJECTION INTRAVENOUS
Status: DISCONTINUED | OUTPATIENT
Start: 2020-12-21 | End: 2020-12-21 | Stop reason: HOSPADM

## 2020-12-21 RX ORDER — DIPHENHYDRAMINE HYDROCHLORIDE 50 MG/ML
12.5 INJECTION, SOLUTION INTRAMUSCULAR; INTRAVENOUS AS NEEDED
Status: DISCONTINUED | OUTPATIENT
Start: 2020-12-21 | End: 2020-12-21 | Stop reason: HOSPADM

## 2020-12-21 RX ORDER — LABETALOL HCL 20 MG/4 ML
10 SYRINGE (ML) INTRAVENOUS
Status: COMPLETED | OUTPATIENT
Start: 2020-12-21 | End: 2020-12-21

## 2020-12-21 RX ORDER — FENTANYL CITRATE 50 UG/ML
INJECTION, SOLUTION INTRAMUSCULAR; INTRAVENOUS AS NEEDED
Status: DISCONTINUED | OUTPATIENT
Start: 2020-12-21 | End: 2020-12-21 | Stop reason: HOSPADM

## 2020-12-21 RX ORDER — MIDAZOLAM HYDROCHLORIDE 1 MG/ML
INJECTION, SOLUTION INTRAMUSCULAR; INTRAVENOUS AS NEEDED
Status: DISCONTINUED | OUTPATIENT
Start: 2020-12-21 | End: 2020-12-21 | Stop reason: HOSPADM

## 2020-12-21 RX ORDER — LIDOCAINE HYDROCHLORIDE 20 MG/ML
INJECTION, SOLUTION INFILTRATION; PERINEURAL AS NEEDED
Status: DISCONTINUED | OUTPATIENT
Start: 2020-12-21 | End: 2020-12-21 | Stop reason: HOSPADM

## 2020-12-21 RX ORDER — SODIUM CHLORIDE 0.9 % (FLUSH) 0.9 %
5-40 SYRINGE (ML) INJECTION AS NEEDED
Status: DISCONTINUED | OUTPATIENT
Start: 2020-12-21 | End: 2020-12-22 | Stop reason: HOSPADM

## 2020-12-21 RX ORDER — SODIUM CHLORIDE, SODIUM LACTATE, POTASSIUM CHLORIDE, CALCIUM CHLORIDE 600; 310; 30; 20 MG/100ML; MG/100ML; MG/100ML; MG/100ML
INJECTION, SOLUTION INTRAVENOUS
Status: DISCONTINUED | OUTPATIENT
Start: 2020-12-21 | End: 2020-12-21 | Stop reason: HOSPADM

## 2020-12-21 RX ORDER — SODIUM CHLORIDE, SODIUM LACTATE, POTASSIUM CHLORIDE, CALCIUM CHLORIDE 600; 310; 30; 20 MG/100ML; MG/100ML; MG/100ML; MG/100ML
100 INJECTION, SOLUTION INTRAVENOUS CONTINUOUS
Status: DISCONTINUED | OUTPATIENT
Start: 2020-12-21 | End: 2020-12-22 | Stop reason: HOSPADM

## 2020-12-21 RX ORDER — NALOXONE HYDROCHLORIDE 0.4 MG/ML
0.2 INJECTION, SOLUTION INTRAMUSCULAR; INTRAVENOUS; SUBCUTANEOUS
Status: DISCONTINUED | OUTPATIENT
Start: 2020-12-21 | End: 2020-12-21 | Stop reason: HOSPADM

## 2020-12-21 RX ORDER — PROPOFOL 10 MG/ML
INJECTION, EMULSION INTRAVENOUS AS NEEDED
Status: DISCONTINUED | OUTPATIENT
Start: 2020-12-21 | End: 2020-12-21 | Stop reason: HOSPADM

## 2020-12-21 RX ORDER — SODIUM CHLORIDE 0.9 % (FLUSH) 0.9 %
5-40 SYRINGE (ML) INJECTION EVERY 8 HOURS
Status: DISCONTINUED | OUTPATIENT
Start: 2020-12-21 | End: 2020-12-22 | Stop reason: HOSPADM

## 2020-12-21 RX ORDER — SODIUM CHLORIDE 0.9 % (FLUSH) 0.9 %
5-40 SYRINGE (ML) INJECTION EVERY 8 HOURS
Status: DISCONTINUED | OUTPATIENT
Start: 2020-12-21 | End: 2020-12-21 | Stop reason: HOSPADM

## 2020-12-21 RX ORDER — LIDOCAINE HYDROCHLORIDE 10 MG/ML
0.1 INJECTION, SOLUTION EPIDURAL; INFILTRATION; INTRACAUDAL; PERINEURAL AS NEEDED
Status: DISCONTINUED | OUTPATIENT
Start: 2020-12-21 | End: 2020-12-21 | Stop reason: HOSPADM

## 2020-12-21 RX ORDER — HYDROMORPHONE HYDROCHLORIDE 1 MG/ML
.25-1 INJECTION, SOLUTION INTRAMUSCULAR; INTRAVENOUS; SUBCUTANEOUS
Status: DISCONTINUED | OUTPATIENT
Start: 2020-12-21 | End: 2020-12-22 | Stop reason: HOSPADM

## 2020-12-21 RX ORDER — SODIUM CHLORIDE, SODIUM LACTATE, POTASSIUM CHLORIDE, CALCIUM CHLORIDE 600; 310; 30; 20 MG/100ML; MG/100ML; MG/100ML; MG/100ML
125 INJECTION, SOLUTION INTRAVENOUS CONTINUOUS
Status: DISCONTINUED | OUTPATIENT
Start: 2020-12-21 | End: 2020-12-21 | Stop reason: HOSPADM

## 2020-12-21 RX ORDER — PROPOFOL 10 MG/ML
INJECTION, EMULSION INTRAVENOUS
Status: DISCONTINUED | OUTPATIENT
Start: 2020-12-21 | End: 2020-12-21 | Stop reason: HOSPADM

## 2020-12-21 RX ORDER — ROPIVACAINE HYDROCHLORIDE 5 MG/ML
INJECTION, SOLUTION EPIDURAL; INFILTRATION; PERINEURAL AS NEEDED
Status: DISCONTINUED | OUTPATIENT
Start: 2020-12-21 | End: 2020-12-21 | Stop reason: HOSPADM

## 2020-12-21 RX ORDER — SODIUM CHLORIDE 0.9 % (FLUSH) 0.9 %
5-40 SYRINGE (ML) INJECTION AS NEEDED
Status: DISCONTINUED | OUTPATIENT
Start: 2020-12-21 | End: 2020-12-21 | Stop reason: HOSPADM

## 2020-12-21 RX ADMIN — PROPOFOL 50 MG: 10 INJECTION, EMULSION INTRAVENOUS at 16:20

## 2020-12-21 RX ADMIN — SODIUM CHLORIDE, POTASSIUM CHLORIDE, SODIUM LACTATE AND CALCIUM CHLORIDE: 600; 310; 30; 20 INJECTION, SOLUTION INTRAVENOUS at 16:17

## 2020-12-21 RX ADMIN — LIDOCAINE HYDROCHLORIDE 80 MG: 20 INJECTION, SOLUTION INFILTRATION; PERINEURAL at 16:22

## 2020-12-21 RX ADMIN — FENTANYL CITRATE 100 MCG: 0.05 INJECTION, SOLUTION INTRAMUSCULAR; INTRAVENOUS at 14:47

## 2020-12-21 RX ADMIN — PROPOFOL 60 MCG/KG/MIN: 10 INJECTION, EMULSION INTRAVENOUS at 16:22

## 2020-12-21 RX ADMIN — MIDAZOLAM HYDROCHLORIDE 2 MG: 2 INJECTION, SOLUTION INTRAMUSCULAR; INTRAVENOUS at 14:47

## 2020-12-21 RX ADMIN — LABETALOL HYDROCHLORIDE 10 MG: 5 INJECTION, SOLUTION INTRAVENOUS at 15:07

## 2020-12-21 RX ADMIN — ROPIVACAINE HYDROCHLORIDE 30 ML: 5 INJECTION, SOLUTION EPIDURAL; INFILTRATION; PERINEURAL at 14:54

## 2020-12-21 RX ADMIN — CEFAZOLIN SODIUM 2 G: 1 POWDER, FOR SOLUTION INTRAMUSCULAR; INTRAVENOUS at 16:51

## 2020-12-21 RX ADMIN — ONDANSETRON HYDROCHLORIDE 4 MG: 2 SOLUTION INTRAMUSCULAR; INTRAVENOUS at 16:48

## 2020-12-21 RX ADMIN — SODIUM CHLORIDE, POTASSIUM CHLORIDE, SODIUM LACTATE AND CALCIUM CHLORIDE 125 ML/HR: 600; 310; 30; 20 INJECTION, SOLUTION INTRAVENOUS at 13:55

## 2020-12-21 NOTE — PERIOP NOTES
Discharge instructions given to patient and to Mihir Marie with all questions answered. Patient denies pain or nausea at this time, ambulated from wheelchair to restroom to void with minimal assistance, and states she is ready to go home. Per Dr. Fallon Walden, ok to discharge patient.

## 2020-12-21 NOTE — ANESTHESIA POSTPROCEDURE EVALUATION
Procedure(s):  LEFT THUMB WOUND EXPLORATION, LEFT THUMB HARDWARE REMOVAL AND REPLACEMENT,, LEFT THUMB REVISION ARTHRODESIS (URGENT). regional, MAC    Anesthesia Post Evaluation      Multimodal analgesia: multimodal analgesia not used between 6 hours prior to anesthesia start to PACU discharge  Patient location during evaluation: PACU  Patient participation: complete - patient participated  Level of consciousness: awake  Pain management: adequate  Airway patency: patent  Anesthetic complications: no  Cardiovascular status: acceptable, blood pressure returned to baseline and hemodynamically stable  Respiratory status: acceptable  Hydration status: acceptable  Post anesthesia nausea and vomiting:  controlled      INITIAL Post-op Vital signs:   Vitals Value Taken Time   /82 12/21/20 1805   Temp 36.6 °C (97.8 °F) 12/21/20 1738   Pulse 66 12/21/20 1806   Resp 18 12/21/20 1806   SpO2 98 % 12/21/20 1806   Vitals shown include unvalidated device data.

## 2020-12-21 NOTE — BRIEF OP NOTE
Brief Postoperative Note    Patient: Sherine Davalos  YOB: 1960  MRN: [id number]    Date of Procedure: 12/21/2020     Pre-Op Diagnosis: LEFT THUMB MCP ARTHRITIS WITH FAILED FIXATION    Post-Op Diagnosis: Same as preoperative diagnosis. Procedure(s):  LEFT THUMB WOUND EXPLORATION, LEFT THUMB HARDWARE REMOVAL AND REPLACEMENT,, LEFT THUMB REVISION ARTHRODESIS (URGENT)    Surgeon(s):  Reuben Guzman MD    Surgical Assistant: Physician Assistant: Tanesha Kaba PA-C    Anesthesia: Regional     Estimated Blood Loss (mL): Minimal    Complications: None    Specimens:   ID Type Source Tests Collected by Time Destination   1 : Left Thumb Tissue Thumb AEROBIC/ANAEROBIC CULTURE Reuben Guzman MD 12/21/2020 1201 Microbiology        Implants:   Implant Name Type Inv. Item Serial No.  Lot No. LRB No. Used Action   26.0 Standard Acutrak 2 Bone Screw   N/A [employer] [id number] Left 1 Implanted       Drains: * No LDAs found *    Findings: As above.      Electronically Signed by Bianca Vogel PA-C on 12/21/2020 at 5:42 PM

## 2020-12-21 NOTE — DISCHARGE INSTRUCTIONS
Upper Extremity Surgery Discharge Instructions  Dr. Neil Gutierrez    Please take the time to review the following instructions before you leave the hospital and use them as guidelines during your recovery from surgery. If you have any questions, you may contact my office at [phone]. Wound Care / Dressing Change    Do NOT remove your dressing or get them wet. Arelia Barrack / Bathing    May bathe/shower as long as dressing/splint/cast is kept dry. Sling    You are not required to wear a sling and should do so only as needed for comfort. You may remove your sling once the block wears off, which may be anywhere from 8-48 hrs after surgery. Activity    Please begin using fingers immediately after surgery, with the exception of your thumb, working to improve motion of straightening and flexing your fingers several times per day. No lifting with your affected hand. Otherwise, you may proceed with activity as tolerated. No driving until you receive further notice otherwise. Ice and Elevation    Keep your hand elevated continuously for 48 hours after surgery using the pillow provided. Your hand/wrist should always be above the level of your heart. Sleep with your arm elevated to minimize swelling and discomfort. Continue ice consistently for 48 hours after surgery. After 48 hours, you should ice 3 times per day for 20 minutes at a time for the next 5 days. After 1 week from surgery, you may use ice as needed for pain. Diet    You may advance your regular diet as tolerated. Increase your clear liquid intake for the next 2-3 days. Medications  1. You will be given prescriptions for pain medication, and nausea when you are discharged from the hospital. Please use the medications as prescribed. Pain medications may cause constipation - over the counter Colace or Milk of Magnesia may be used as needed.  Other possible side effects of pain medications are dizziness, headache, nausea, vomiting, and urinary retention. Discontinue the pain medication if you develop itching, rash, shortness of breath, or difficulties swallowing. If these symptoms become severe or arent relieved by discontinuing the medication, you should seek immediate medical attention. 2. Refills of pain medication are authorized during office hours only (8AM - 5PM Monday through Friday)  3. If you pain medication prescribed at the time of surgery contains Tylenol/Acetaminophen, DO NOT TAKE additional Tylenol/Acetaminophen. Do not exceed 4000mg of Tylenol/Acetaminophen per day. 4. You may resume the medication you were taking prior to your surgery. Pain medication may change the effects of any antidepressant medication you may be taking. If you have any questions about possible interactions between your regular medication and the pain medication, you should consult the physician who prescribes your regular medications. 5. Do not drive until further notice. 6. You were prescribed a nausea medication. It is only necessary to fill this if you are experiencing nausea. Please call the office at [phone] if you have any increasing numbness or tingling, increasing drainage on your dressing, fever greater than 100.5 degrees F or pain not controlled by medications. If you are experiencing chest pain or shortness of breath, please alert your primary care physician immediately. DISCHARGE SUMMARY from your Nurse      PATIENT INSTRUCTIONS    After general anesthesia or intravenous sedation, for 24 hours or while taking prescription Narcotics:  · Limit your activities  · Do not drive and operate hazardous machinery  · Do not make important personal or business decisions  · Do  not drink alcoholic beverages  · If you have not urinated within 8 hours after discharge, please contact your surgeon on call.     Report the following to your surgeon:  · Excessive pain, swelling, redness or odor of or around the surgical area  · Temperature over 100.5  · Nausea and vomiting lasting longer than 4 hours or if unable to take medications  · Any signs of decreased circulation or nerve impairment to extremity: change in color, persistent  numbness, tingling, coldness or increase pain  · Any questions      GOOD HELP TO FIGHT AN INFECTION  Here are a few tip to help reduce the chance of getting an infection after surgery:   Wash Your Hands   Good handwashing is the most important thing you and your caregiver can do.  Wash before and after caring for any wounds. Dry your hand with a clean towel.  Wash with soap and water for at least 20 seconds. A TIP: sing the \"Happy Birthday\" song through one time while washing to help with the timing.  Use a hand  in between washings.  Shower   When your surgeon says it is OK to take a shower, use a new bar of antibacterial soap (if that is what you use, and keep that bar of soap ONLY for your use), or antibacterial body wash.  Use a clean wash cloth or sponge when you bathe.  Dry off with a clean towel  after every bath - be careful around any wounds, skin staples, sutures or surgical glue over/on wounds.  Do not enter swimming pools, hot tubs, lakes, rivers and/or ocean until wounds are healed and your doctor/surgeon says it is OK.  Use Clean Sheets   Sleep on freshly laundered sheets after your surgery.  Keep the surgery site covered with a clean, dry bandage (if instructed to do so). If the bandage becomes soiled, reapply a new, dry, clean bandage.  Do not allow pets to sleep with you while your wound is healing.  Lifestyle Modification and Controlling Your Blood Sugar   Smoking slows wound healing. Stop smoking and limit exposure to second-hand smoke.  High blood sugar slows wound healing.   Eat a well-balanced diet to provide proper nutrition while healing   Monitor your blood sugar (if you are a diabetic) and take your medications as you are suppose to so you can control you blood sugar after surgery. COUGH AND DEEP BREATHE    Breathing deeply and coughing are very important exercises to do after surgery. Deep breathing and coughing open the little air tubes and air sacks in your lungs. You take deep breaths every day. You may not even notice - it is just something you do when you sigh or yawn. It is a natural exercise you do to keep these air passages open. After surgery, take deep breaths and cough, on purpose. DIRECTIONS:  · Take 10 to 15 slow deep breaths every hour while awake. · Breathe in deeply, and hold it for 2 seconds. · Exhale slowly through puckered lips, like blowing up a balloon. · After every 4th or 5th deep breath, hug your pillow to your chest or belly and give a hard, deep cough. Yes, it will probably hurt. But doing this exercise is a very important part of healing after surgery. Take your pain medicine to help you do this exercise without too much pain. Coughing and deep breathing help prevent bronchitis and pneumonia after surgery. If you had chest or belly surgery, use a pillow as a \"hug boubacar\" and hold it tightly to your chest or belly when you cough. ANKLE PUMPS    Ankle pumps increase the circulation of oxygenated blood to your lower extremities and decrease your risk for circulation problems such as blood clots. They also stretch the muscles, tendons and ligaments in your foot and ankle, and prevent joint contracture in the ankle and foot, especially after surgeries on the legs. It is important to do ankle pump exercises regularly after surgery because immobility increases your risk for developing a blood clot. Your doctor may also have you take an Aspirin for the next few days as well. If your doctor did not ask you to take an Aspirin, consult with him before starting Aspirin therapy on your own. The exercise is quite simple.      · Slowly point your foot forward, feeling the muscles on the top of your lower leg stretch, and hold this position for 5 seconds. · Next, pull your foot back toward you as far as possible, stretching the calf muscles, and hold that position for 5 seconds. · Repeat with the other foot. · Perform 10 repetitions every hour while awake for both ankles if possible (down and then up with the foot once is one repetition). You should feel gentle stretching of the muscles in your lower leg when doing this exercise. If you feel pain, or your range of motion is limited, don't push too hard. Only go the limit your joint and muscles will let you go. If you have increasing pain, progressively worsening leg warmth or swelling, STOP the exercise and call your doctor. MEDICATION AND   SIDE EFFECT GUIDE    The Demario Jonesith MEDICATION AND SIDE EFFECT GUIDE was provided to the PATIENT AND CARE PROVIDER. Information provided includes instruction about drug purpose and common side effects for the following medications:   · Hydrocodone-acetaminophen (Norco)        These are general instructions for a healthy lifestyle:    *   Please give a list of your current medications to your Primary Care Provider. *   Please update this list whenever your medications are discontinued, doses are changed, or new medications (including over-the-counter products) are added. *   Please carry medication information at all times in case of emergency situations. About Smoking  No smoking / No tobacco products  Avoid exposure to second hand smoke     Surgeon General's Warning:  Quitting smoking now greatly reduces serious risk to your health. Obesity, smoking, and sedentary lifestyle greatly increases your risk for illness and disease. A healthy diet, regular physical exercise & weight monitoring are important for maintaining a healthy lifestyle.       Congestive Heart Failure  You may be retaining fluid if you have a history of heart failure or if you experience any of the following symptoms:  Weight gain of 3 pounds or more overnight or 5 pounds in a week, increased swelling in your hands or feet or shortness of breath while lying flat in bed. Please call your doctor as soon as you notice any of these symptoms; do not wait until your next office visit. Recognize signs and symptoms of STROKE:  F -  Face looks uneven  A -  Arms unable to move or move evenly  S -  Speech slurred or non-existent  T -  Time-call 911 as soon as signs and symptoms begin-DO NOT go          back to bed or wait to see if you get better-TIME IS BRAIN. Warning Signs of HEART ATTACK   Call 911 if you have these symptoms:     Chest discomfort. Most heart attacks involve discomfort in the center of the chest that lasts more than a few minutes, or that goes away and comes back. It can feel like uncomfortable pressure, squeezing, fullness, or pain.  Discomfort in other areas of the upper body. Symptoms can include pain or discomfort in one or both arms, the back, neck, jaw, or stomach.  Shortness of breath with or without chest discomfort.  Other signs may include breaking out in a cold sweat, nausea, or lightheadedness. Don't wait more than five minutes to call 911 - MINUTES MATTER! Fast action can save your life. Calling 911 is almost always the fastest way to get lifesaving treatment. Emergency Medical Services staff can begin treatment when they arrive -- up to an hour sooner than if someone gets to the hospital by car. Learning About Coronavirus [phone])  Coronavirus [phone]): Overview  What is coronavirus (COVID-19)? The coronavirus disease (COVID-19) is caused by a virus. It is an illness that was first found in [place], [place], in December 2019. It has since spread worldwide. The virus can cause fever, cough, and trouble breathing. In severe cases, it can cause pneumonia and make it hard to breathe without help.  It can cause death. Coronaviruses are a large group of viruses. They cause the common cold. They also cause more serious illnesses like [place] East respiratory syndrome (MERS) and severe acute respiratory syndrome (SARS). COVID-19 is caused by a novel coronavirus. That means it's a new type that has not been seen in people before. This virus spreads person-to-person through droplets from coughing and sneezing. It can also spread when you are close to someone who is infected. And it can spread when you touch something that has the virus on it, such as a doorknob or a tabletop. What can you do to protect yourself from coronavirus (COVID-19)? The best way to protect yourself from getting sick is to:  · Avoid areas where there is an outbreak. · Avoid contact with people who may be infected. · Wash your hands often with soap or alcohol-based hand sanitizers. · Avoid crowds and try to stay at least 6 feet away from other people. · Wash your hands often, especially after you cough or sneeze. Use soap and water, and scrub for at least 20 seconds. If soap and water aren't available, use an alcohol-based hand . · Avoid touching your mouth, nose, and eyes. What can you do to avoid spreading the virus to others? To help avoid spreading the virus to others:  · Cover your mouth with a tissue when you cough or sneeze. Then throw the tissue in the trash. · Use a disinfectant to clean things that you touch often. · Stay home if you are sick or have been exposed to the virus. Don't go to school, work, or public areas. And don't use public transportation. · If you are sick:  ? Leave your home only if you need to get medical care. But call the doctor's office first so they know you're coming. And wear a face mask, if you have one.  ? If you have a face mask, wear it whenever you're around other people. It can help stop the spread of the virus when you cough or sneeze. ? Clean and disinfect your home every day.  Use household  and disinfectant wipes or sprays. Take special care to clean things that you grab with your hands. These include doorknobs, remote controls, phones, and handles on your refrigerator and microwave. And don't forget countertops, tabletops, bathrooms, and computer keyboards. When to call for help  Call 911 anytime you think you may need emergency care. For example, call if:  · You have severe trouble breathing. (You can't talk at all.)  · You have constant chest pain or pressure. · You are severely dizzy or lightheaded. · You are confused or can't think clearly. · Your face and lips have a blue color. · You pass out (lose consciousness) or are very hard to wake up. Call your doctor now if you develop symptoms such as:  · Shortness of breath. · Fever. · Cough. If you need to get care, call ahead to the doctor's office for instructions before you go. Make sure you wear a face mask, if you have one, to prevent exposing other people to the virus. Where can you get the latest information? The following health organizations are tracking and studying this virus. Their websites contain the most up-to-date information. Neela Espinoza also learn what to do if you think you may have been exposed to the virus. · U.[place]. Centers for Disease Control and Prevention (CDC): The CDC provides updated news about the disease and travel advice. The website also tells you how to prevent the spread of infection. www.cdc.gov  · World Health Organization [facility]): WHO offers information about the virus outbreaks. WHO also has travel advice. www.who.int  Current as of: April 1, 2020               Content Version: 12.4  © 5754-4579 [employer].    Care instructions adapted under license by your healthcare professional. If you have questions about a medical condition or this instruction, always ask your healthcare professional. [address] any warranty or liability for your use of this information. The discharge information has been reviewed with the patient and caregiver. Any questions and concerns from the patient and caregiver have been addressed. The patient and caregiver verbalized understanding. The following personal items collected during your admission are returned to you:   Dental Appliance: Dental Appliances: None  Vision:    Hearing Aid:    Jewelry: Jewelry: None  Clothing: Clothing: Socks, Undergarments  Other Valuables:  Other Valuables: Eyeglasses  Valuables sent to safe:

## 2020-12-21 NOTE — H&P
Orthopedic Admission History and Physical        NAME: Lakhwinder Cates       :  1960       MRN:  [id number]      Subjective:     Patient is a 56 y.o. female who presents with history of left thumb CMC osteoarthritis treated with left thumb CMC arthrodesis on 10/30/20. Presents today for surgical treatment. Patient Active Problem List    Diagnosis Date Noted    Degenerative arthritis of metacarpophalangeal joint of left thumb 10/30/2020    De Quervain's tenosynovitis, left 10/30/2020    Trigger thumb, left thumb 10/30/2020    Routine Papanicolaou smear 10/26/2020    Cervical stenosis of spine 2015    Acute posthemorrhagic anemia 10/08/2012    Lupus (HCC)     Murmur, cardiac     Hypertriglyceridemia     Hyperlipemia     Tobacco abuse     GERD (gastroesophageal reflux disease)     Asthma     Hx of seizure disorder     Hypertension     ETOH abuse     DJD (degenerative joint disease)     DDD (degenerative disc disease), lumbar     Neuropathic pain     Hyponatremia      Past Medical History:   Diagnosis Date    Asthma     activity induced asthma     Autoimmune disease ([address]     mixed connective tissue disease    Cancer ([address]     skin-basal    Chronic pain     lower back, joints    DDD (degenerative disc disease), lumbar     Dense breast tissue on mammogram 10/02/2020    BI-RADS 1: Negative.     DJD (degenerative joint disease)     ETOH abuse     Sober 11    GERD (gastroesophageal reflux disease)     Hernia     Hx of bronchitis     Hx of mammogram 10/02/2019    Negative     Hx of seizure disorder 2010    unknown cause, none since    Hx of sinusitis     Hyperlipemia     Hypertension     Hypertriglyceridemia     Hyponatremia     Insomnia     Lupus ([address]     Murmur, cardiac     Neuropathic pain     Osteopenia     DEXA (), started on Fosamax per rheumatology    Other bursitis of hip, right hip 2016    Had a deep hip injection for pain     Routine Papanicolaou smear 09/20/2016    Negative (no hpv)     Stroke ([facility] [address] 3/11    PCP states she had a TIA - patient denies this (was low Na)    Tobacco abuse     Stopped in 2012      Past Surgical History:   Procedure Laterality Date    HX CYST INCISION AND DRAINAGE Left 08/2016    HX HERNIA REPAIR  10/7/2014    incisional with mesh    HX LAP CHOLECYSTECTOMY  3/11/2014    HX LUMBAR FUSION      10/8/2012    HX LUMBAR LAMINECTOMY      rods in 1996, out 1998    HX LUMBAR [facility]INECT[facility]  [facility]    back surgery    HX ORTHOPAEDIC Bilateral 2013 R, 2014 L    carpal tunnel    HX ORTHOPAEDIC  6/27/2013    right knee arthroscopy    HX ORTHOPAEDIC Right 3/2015    trigger thumb release    HX ORTHOPAEDIC  10/8/2012    ALIF/ PLIF with bone stimulator    HX ORTHOPAEDIC  10/23/2012    Back surgery to adjust hardware    HX ORTHOPAEDIC  01/2018    ALIF - fusion L2-L3    HX ORTHOPAEDIC  04/01/2019    Back surgery    HX OTHER SURGICAL  10/08/2012    [facility] Bone Growth Stimulator    HX TONSIL AND ADENOIDECTOMY  1962    HX TOTAL ABDOMINAL HYSTERECTOMY  12/20/1992    fibroids; Dr. Mahesh Major      Prior to Admission medications    Medication Sig Start Date End Date Taking? Authorizing Provider   losartan (COZAAR) 50 mg tablet  9/21/20  Yes Provider, Historical   Estradiol (DivigeL) 0.5 mg/0.5 gram (0.1 %) glpk 1 Packet by TransDERmal route daily. Apply 1 packet to thigh daily 10/16/20  Yes Haider Kumar MD   dexlansoprazole [facility]) 60 mg CpDB capsule (delayed release)  7/16/19  Yes Provider, Historical   pregabalin (LYRICA) 150 mg capsule Take  by mouth. Yes Provider, Historical   fluticasone-salmeterol (ADVAIR DISKUS) 250-50 mcg/dose diskus inhaler Take 1 Puff by inhalation every twelve (12) hours. Yes Provider, Historical   cyanocobalamin 1,000 mcg tablet Take 1,000 mcg by mouth daily.    Yes Provider, Historical   cycloSPORINE (RESTASIS) 0.05 % ophthalmic emulsion Administer 1 Drop to both eyes two (2) times a day. Yes Provider, Historical   folic acid (FOLVITE) 1 mg tablet Take 2 mg by mouth daily. Yes Provider, Historical   gemfibrozil (LOPID) 600 mg tablet Take 600 mg by mouth Before breakfast and dinner. Yes Provider, Historical   hydroxychloroquine (PLAQUENIL) 200 mg tablet Take 200 mg by mouth two (2) times a day. Yes Provider, Historical   cholecalciferol, vitamin D3, (VITAMIN D3) 2,000 unit tab Take 2,000 Units by mouth daily. Yes Provider, Historical   therapeutic multivitamin (THERAGRAN) tablet Take 1 Tab by mouth daily. Yes Provider, Historical   pyridoxine, vitamin B6, (VITAMIN B-6) 100 mg tablet Take 100 mg by mouth daily. Yes Provider, Historical   ondansetron (Zofran ODT) 8 mg disintegrating tablet Take 1 Tab by mouth every eight (8) hours as needed for Nausea. 10/30/20   Rebekah Jovel PA-C   docusate sodium (COLACE) 50 mg capsule Take 1 Cap by mouth two (2) times a day for 90 days. 10/30/20 1/28/21  Rebekah Jovel PA-C   furosemide (LASIX) 20 mg tablet Lasix 20 mg tablet 2/23/19   Provider, Historical   albuterol (PROVENTIL HFA, VENTOLIN HFA, PROAIR HFA) 90 mcg/actuation inhaler Take 2 Puffs by inhalation every six (6) hours as needed for Wheezing. Provider, Historical   sodium chloride 1 gram tablet Take 1 g by mouth daily.     Provider, Historical     Current Facility-Administered Medications   Medication Dose Route Frequency    lidocaine (PF) (XYLOCAINE) 10 mg/mL (1 %) injection 0.1 mL  0.1 mL SubCUTAneous PRN    lactated Ringers infusion  125 mL/hr IntraVENous CONTINUOUS    lactated ringers bolus infusion 1,000 mL  1,000 mL IntraVENous ONCE    sodium chloride (NS) flush 5-40 mL  5-40 mL IntraVENous Q8H    sodium chloride (NS) flush 5-40 mL  5-40 mL IntraVENous PRN    naloxone (NARCAN) injection 0.2 mg  0.2 mg IntraVENous Multiple    flumazeniL (ROMAZICON) 0.1 mg/mL injection 0.2 mg  0.2 mg IntraVENous Multiple    ceFAZolin (ANCEF) 2 g in sterile water (preservative free) 20 mL IV syringe  2 g IntraVENous ONCE      Allergies   Allergen Reactions    Penicillins Rash     Fever. 16 Reports able to take Keflex without problem.  Tramadol Shortness of Breath    Adhesive Tape-Silicones Rash      Social History     Tobacco Use    Smoking status: Former Smoker     Packs/day: 2.00     Years: 32.00     Pack years: 64.00     Quit date: 2012     Years since quittin.4    Smokeless tobacco: Never Used    Tobacco comment: Never used vapor or e-cigs    Substance Use Topics    Alcohol use: No     Alcohol/week: 0.0 standard drinks     Comment: Sober 4 years      Family History   Problem Relation Age of Onset    Hypertension Mother     Stroke Mother         TIA    Arthritis-osteo Father         RA    No Known Problems Sister     No Known Problems Brother     No Known Problems Sister     No Known Problems Sister         Review of Systems  A comprehensive review of systems was negative except for that written in the HPI. Objective:     Patient Vitals for the past 8 hrs:   BP Temp Pulse Resp SpO2 Height Weight   20 1335 (!) 192/91 98.2 °F (36.8 °C) 65 20 99 % 5' 4\" (1.626 m) 87.4 kg (192 lb 10.9 oz)     Temp (24hrs), Av.2 °F (36.8 °C), Min:98.2 °F (36.8 °C), Max:98.2 °F (36.8 °C)      Physical Exam:  General appearance: alert, cooperative, no distress, appears stated age  Lungs: No use of accessory breathing muscles. Breathing unlabored. Cardiac: Regular rate. Abdomen: soft, non-tender, non-distended  Extremities: As per prior exam.     Labs: No results found for this or any previous visit (from the past 24 hour(s)). Assessment:   No medical contraindications to proceeding with planned surgery. Please see initial office note for full discussion of risks, benefits, and alternatives to surgery.     Patient Active Problem List    Diagnosis Date Noted    Degenerative arthritis of metacarpophalangeal joint of left thumb 10/30/2020    De Quervain's tenosynovitis, left 10/30/2020    Trigger thumb, left thumb 10/30/2020    Routine Papanicolaou smear 10/26/2020    Cervical stenosis of spine 05/11/2015    Acute posthemorrhagic anemia 10/08/2012    Lupus (HCC)     Murmur, cardiac     Hypertriglyceridemia     Hyperlipemia     Tobacco abuse     GERD (gastroesophageal reflux disease)     Asthma     Hx of seizure disorder     Hypertension     ETOH abuse     DJD (degenerative joint disease)     DDD (degenerative disc disease), lumbar     Neuropathic pain     Hyponatremia          Plan:   Proceed with surgery  Pt. stable  Pt.  NPO x meds

## 2020-12-21 NOTE — ANESTHESIA PREPROCEDURE EVALUATION
Relevant Problems   No relevant active problems       Anesthetic History   No history of anesthetic complications            Review of Systems / Medical History  Patient summary reviewed and pertinent labs reviewed    Pulmonary          Smoker  Asthma : well controlled  Pertinent negatives: No recent URI     Neuro/Psych         TIA     Cardiovascular    Hypertension                   GI/Hepatic/Renal     GERD           Endo/Other        Arthritis     Other Findings   Comments: Connective tissue disorder  ?  Lupus           Physical Exam    Airway  Mallampati: II  TM Distance: 4 - 6 cm  Neck ROM: normal range of motion   Mouth opening: Normal     Cardiovascular  Regular rate and rhythm,  S1 and S2 normal,  no murmur, click, rub, or gallop  Rhythm: regular  Rate: normal         Dental    Dentition: Implants     Pulmonary  Breath sounds clear to auscultation               Abdominal  GI exam deferred       Other Findings            Anesthetic Plan    ASA: 3  Anesthesia type: regional and MAC - supraclavicular block      Post-op pain plan if not by surgeon: peripheral nerve block single    Induction: Intravenous  Anesthetic plan and risks discussed with: Patient

## 2020-12-21 NOTE — ANESTHESIA PROCEDURE NOTES
Peripheral Block    Start time: 12/21/2020 2:47 PM  End time: 12/21/2020 2:54 PM  Performed by: José Kaur DO  Authorized by: José Kaur DO       Pre-procedure:    Indications: at surgeon's request and primary anesthetic    Preanesthetic Checklist: patient identified, risks and benefits discussed, site marked, timeout performed, anesthesia consent given and patient being monitored    Timeout Time: 14:47          Block Type:   Block Type:  Supraclavicular  Laterality:  Left  Monitoring:  Continuous pulse ox, frequent vital sign checks, heart rate, responsive to questions and oxygen  Injection Technique:  Single shot  Procedures: ultrasound guided    Patient Position: supine  Prep: chlorhexidine    Location:  Supraclavicular  Needle Type:  Stimuplex  Needle Gauge:  21 G  Needle Localization:  Anatomical landmarks and ultrasound guidance    Assessment:  Number of attempts:  1  Injection Assessment:  Incremental injection every 5 mL, local visualized surrounding nerve on ultrasound, negative aspiration for blood, no paresthesia and no intravascular symptoms  Patient tolerance:  Patient tolerated the procedure well with no immediate complications Awake

## 2020-12-22 NOTE — OP NOTES
[facility]  OPERATIVE REPORT    Name:  Isabel Donahue  MR#:  [id number]  :  1960  ACCOUNT #:  [de-identified]  DATE OF SERVICE:  2020    PREOPERATIVE DIAGNOSIS:  Left metacarpophalangeal arthritis status post arthrodesis in the setting of loosening hardware. POSTOPERATIVE DIAGNOSIS:  Left metacarpophalangeal arthritis status post arthrodesis in the setting of loosening hardware. PROCEDURES PERFORMED:  1. Removal of hardware, left thumb. 2.  Revision arthrodesis, left thumb. SURGEON:  Sandee Saul MD    ASSISTANT:  Rebekah Jovel. Indication for the need for an assistant: An assistant was needed in the case for appropriate exposure, retraction, and assistance with closure and splinting. ANESTHESIA:  Regional.    COMPLICATIONS:  None. SPECIMENS REMOVED:  Left thumb MCP for culture. IMPLANTS:  Acutrak 2 standard screw, 26 mm. ESTIMATED BLOOD LOSS:  Minimal.    FINDINGS:  Three of the four screws in the plate were loose and there was gross motion of the arthrodesis site though there was no evidence of any purulent material or any semblance of any infection. INDICATION FOR THE PROCEDURE:  The patient is a pleasant 25-year-old female who is status post recent arthrodesis of her thumb MCP but has had evidence radiographically of loosening of some of the screws. She has had several falls but there was no significant trauma. We discussed treatment options and elected to proceed with the above to address her symptoms, understanding that if this looked grossly infected, we would remove all hardware and start her on antibiotics and if there was no evidence of infection would proceed with revision arthrodesis. She signed informed consent, understanding the risks of bleeding, infection, damage to surrounding nerves and blood vessels, persistent pain and stiffness, loss of function, failure to heal, need for further surgery, hardware complications.   She signed informed consent. PROCEDURE:  The patient was seen and identified in the [address]. The operative site was marked. Preoperative questions were invited and answered. She was evaluated by the Anesthesia team and brought to the operative suite on a stretcher, transferred to the operating room table. Light sedation was induced. She was prepped and draped in usual fashion. The arm was exsanguinated using an Esmarch bandage and the well-padded tourniquet was inflated to 250 mmHg. Next, a longitudinal incision was made over her prior surgical incision. Full-thickness skin flaps were elevated. There was no evidence of infection though the subcutaneous tissues were cultured just for completeness. At this point, the patient received IV antibiotics. Extensor tendons were retracted and the plate was exposed with findings as above. The plate was removed uneventfully. The oblique screw was noted to be still well seated but the remaining screws were loosened. Upon exploration of the arthrodesis mass, there was no bridging bone though there was no evidence of any loss of normal bony fixation. Any abnormal bony tissue was removed with a rongeur. This required minimal removal at this point. The bony ends remained well approximated though still were able to be grossly moved with digital manipulation. Once appropriate preparation of the arthrodesis site was done, the guidewire for the standard screw was placed in a center-center position and held with two aditional derotational wires. Appropriate length was confirmed using fluoroscopy. The wire was measured and drilled and the appropriately-sized screw was inserted with excellent compression and fixation. Given the small nature of the canal and the excellent fixation noted with good compression, no further fixation was deemed appropriate. The wound was irrigated. The tourniquet was let down. Hemostasis was obtained with bipolar cautery.   The skin was closed with nylon. A sterile dressing was applied as well as a thumb spica splint. POSTOPERATIVE PLAN:  The patient will return in 2 weeks for wound check and an x-ray. We will follow her labs. I did order a CBC, sed rate, and CRP as the baseline lab though, again, there was no evidence of infection. We will also order baseline vitamin D level on her.       Fredis Butt MD      [id number]  D:  12/21/2020 18:14  T:  12/21/2020 19:59  JOB #:  [id number]

## 2020-12-25 LAB
BACTERIA SPEC CULT: NORMAL
BACTERIA SPEC CULT: NORMAL
GRAM STN SPEC: NORMAL
SERVICE CMNT-IMP: NORMAL
SERVICE CMNT-IMP: NORMAL

## 2021-01-28 ENCOUNTER — TRANSCRIBE ORDER (OUTPATIENT)
Dept: SCHEDULING | Age: 61
End: 2021-01-28

## 2021-01-28 DIAGNOSIS — Z12.31 SCREENING MAMMOGRAM FOR HIGH-RISK PATIENT: Primary | ICD-10-CM

## 2021-10-04 ENCOUNTER — HOSPITAL ENCOUNTER (OUTPATIENT)
Dept: MAMMOGRAPHY | Age: 61
Discharge: HOME | End: 2021-10-04
Attending: OBSTETRICS & GYNECOLOGY
Payer: COMMERCIAL

## 2021-10-04 DIAGNOSIS — Z12.31 SCREENING MAMMOGRAM FOR HIGH-RISK PATIENT: ICD-10-CM

## 2021-10-04 PROCEDURE — 77067 SCR MAMMO BI INCL CAD: CPT

## 2021-10-06 NOTE — PROGRESS NOTES
Annual exam ages 40-58    Flori De La Cruz is a ,  59 y.o. female WHITE/NON- No LMP recorded. Patient has had a hysterectomy. , who presents for her annual checkup. [facility]  LV=10/16/20 AE    Since her last annual GYN exam about one year ago,  she has the following changes in her health history:   - covid vac  - left foot surgery in . Big toe needed to be \"realigned\", d/t arthritis. Has been on Divigel. At AE opted to decrease dose from 0.75 -> 0.5  Reported urinary and fecal incontinence -> refer to urogynecology @ [facility]. Was seen and evaluated. Recommended pelvic floor PT. Did not go (was dealing with foot issues). Vulvar atrophy with band-like tissue at forchette with some tearing of skin with minimal manipulatiom. Does have some vaginal dryness. Has used vaginal estrogen in the past.  Declines RX today. ADDITIONAL CONCERNS:  She is having no significant problems. With regard to the Gardasil vaccine, she is older than the age for which it is FDA approved. Menstrual status:    Her periods are nonexistent in flow. Contraception:    The current method of family planning is none. Sexual history:     reports previously being sexually active. She reports using the following method of birth control/protection: Surgical.        Pap and Mammogram History:    Her most recent Pap smear was normal, HPV was neg, obtained 10/16/20. She does not have a history of abnormal pap smears. The patient had a recent mammogram 10/4/21 which was negative for malignancy. Past Medical History:   Diagnosis Date    Asthma     activity induced asthma     Autoimmune disease ([address]     mixed connective tissue disease    Cancer ([address]     skin-basal    Chronic pain     lower back, joints    DDD (degenerative disc disease), lumbar     Dense breast tissue on mammogram 10/02/2020    BI-RADS 1: Negative.     DJD (degenerative joint disease)     ETOH abuse     Sober 11    GERD (gastroesophageal reflux disease)     Hernia     Hx of bronchitis     Hx of mammogram 10/02/2019    Negative     Hx of seizure disorder 2010    unknown cause, none since    Hx of sinusitis     Hyperlipemia     Hypertension     Hypertriglyceridemia     Hyponatremia     Insomnia     Lupus ([address]     Murmur, cardiac     Neuropathic pain     Osteopenia     DEXA (), started on Fosamax per rheumatology    Other bursitis of hip, right hip 2016    Had a deep hip injection for pain     Routine Papanicolaou smear 2016    Negative (no hpv)     Stroke ([address] 3/11    PCP states she had a TIA - patient denies this (was low Na)    Tobacco abuse     Stopped in      Past Surgical History:   Procedure Laterality Date    HX CYST INCISION AND DRAINAGE Left 2016    HX HERNIA REPAIR  10/7/2014    incisional with mesh    HX LAP CHOLECYSTECTOMY  3/11/2014    HX LUMBAR FUSION      10/8/2012    HX LUMBAR LAMINECTOMY      rods in , out [address]  2017    HX [address]    back surgery    HX ORTHOPAEDIC Bilateral  R,  L    carpal tunnel    HX ORTHOPAEDIC  2013    right knee arthroscopy    HX ORTHOPAEDIC Right 3/2015    trigger thumb release    HX ORTHOPAEDIC  10/8/2012    ALIF/ PLIF with bone stimulator    HX ORTHOPAEDIC  10/23/2012    Back surgery to adjust hardware    HX ORTHOPAEDIC  2018    ALIF - fusion L2-L3    HX ORTHOPAEDIC  2019    Back surgery    HX OTHER SURGICAL  10/08/2012    [facility] Bone Growth Stimulator    HX TONSIL AND ADENOIDECTOMY      HX TOTAL ABDOMINAL HYSTERECTOMY  1992    fibroids; Dr. Leanna Garcia     OB History    Para Term  AB Living   0 0 0         SAB TAB Ectopic Molar Multiple Live Births                 Obstetric Comments   Menarche:  4. LMP: 32.  # of Children:  0. Age at Delivery of First Child:  n/a. Hysterectomy/oophorectomy:  YES/NO. Breast Bx:  Yes left . Hx of Breast Feeding:  n/a.   BCP: no. Hormone therapy:  no.        Current Outpatient Medications   Medication Sig Dispense Refill    sertraline (ZOLOFT) 100 mg tablet       sucralfate (CARAFATE) 1 gram tablet       valsartan (DIOVAN) 320 mg tablet       famotidine (PEPCID) 40 mg tablet famotidine 40 mg tablet   TAKE 1 TABLET BY MOUTH DAILY      copper gluconate 2 mg capsule       Humira,CF, Pen 40 mg/0.4 mL injection pen       Estradiol (DivigeL) 0.25 mg/0.25 gram (0.1 %) glpk 1 Packet by TransDERmal route daily. Apply 1 packet to thigh daily. 90 Packet 4    dexlansoprazole (DEXILANT) 60 mg CpDB capsule (delayed release)       furosemide (LASIX) 20 mg tablet Lasix 20 mg tablet      pregabalin (LYRICA) 150 mg capsule Take  by mouth.  fluticasone-salmeterol (ADVAIR DISKUS) 250-50 mcg/dose diskus inhaler Take 1 Puff by inhalation every twelve (12) hours.  albuterol (PROVENTIL HFA, VENTOLIN HFA, PROAIR HFA) 90 mcg/actuation inhaler Take 2 Puffs by inhalation every six (6) hours as needed for Wheezing.  cyanocobalamin 1,000 mcg tablet Take 1,000 mcg by mouth daily.  cycloSPORINE (RESTASIS) 0.05 % ophthalmic emulsion Administer 1 Drop to both eyes two (2) times a day.  folic acid (FOLVITE) 1 mg tablet Take 2 mg by mouth daily.  gemfibrozil (LOPID) 600 mg tablet Take 600 mg by mouth Before breakfast and dinner.  hydroxychloroquine (PLAQUENIL) 200 mg tablet Take 200 mg by mouth two (2) times a day.  cholecalciferol, vitamin D3, (VITAMIN D3) 2,000 unit tab Take 2,000 Units by mouth daily.  sodium chloride 1 gram tablet Take 1 g by mouth daily.  therapeutic multivitamin (THERAGRAN) tablet Take 1 Tab by mouth daily.  pyridoxine, vitamin B6, (VITAMIN B-6) 100 mg tablet Take 100 mg by mouth daily.  ondansetron (Zofran ODT) 8 mg disintegrating tablet Take 1 Tab by mouth every eight (8) hours as needed for Nausea.  10 Tab 2    losartan (COZAAR) 50 mg tablet        Allergies: Penicillins, Tramadol, and Adhesive tape-silicones   Social History     Socioeconomic History    Marital status:      Spouse name: Not on file    Number of children: Not on file    Years of education: Not on file    Highest education level: Not on file   Occupational History    Not on file   Tobacco Use    Smoking status: Former Smoker     Packs/day: 2.00     Years: 32.00     Pack years: 56.65     Quit date: 2012     Years since quittin.2    Smokeless tobacco: Never Used    Tobacco comment: Never used vapor or e-cigs    Substance and Sexual Activity    Alcohol use: No     Alcohol/week: 0.0 standard drinks     Comment: Sober 4 years    Drug use: No    Sexual activity: Not Currently     Birth control/protection: Surgical   Other Topics Concern    Not on file   Social History Narrative    Not on file     Social Determinants of Health     Financial Resource Strain:     Difficulty of Paying Living Expenses:    Food Insecurity:     Worried About Running Out of Food in the Last Year:     [address] in the Last Year:    Transportation Needs:     Lack of Transportation (Medical):  Lack of Transportation (Non-Medical):    Physical Activity:     Days of Exercise per Week:     Minutes of Exercise per Session:    Stress:     Feeling of Stress :    Social Connections:     Frequency of Communication with Friends and Family:     Frequency of Social Gatherings with Friends and Family:     Attends L[employer] day Services:     Active Member of Clubs or Organizations:     Attends Club or Organization Meetings:     Marital Status:    Intimate Partner Violence:     Fear of Current or Ex-Partner:     Emotionally Abused:     Physically Abused:     Sexually Abused: Tobacco History:  reports that she quit smoking about 9 years ago. She has a 64.00 pack-year smoking history. She has never used smokeless tobacco.  Alcohol Abuse:  reports no history of alcohol use.   Drug Abuse:  reports no history of drug use.     Patient Active Problem List   Diagnosis Code    Lupus ([facility] M32.9    Murmur, cardiac R01.1    Hypertriglyceridemia E78.1    Hyperlipemia E78.5    Tobacco abuse Z72.0    GERD (gastroesophageal reflux disease) K21.9    Asthma J45.909    Hx of seizure disorder Z86.69    Hypertension I10    ETOH abuse F10.10    DJD (degenerative joint disease) M19.90    DDD (degenerative disc disease), lumbar M51.36    Neuropathic pain M79.2    Hyponatremia E87.1    Acute posthemorrhagic anemia D62    Cervical stenosis of spine M48.02    Routine Papanicolaou smear Z12.4    Degenerative arthritis of metacarpophalangeal joint of left thumb M19.042    De Quervain's tenosynovitis, left M65.4    Trigger thumb, left thumb M65.312     Family History   Problem Relation Age of Onset    Hypertension Mother     Stroke Mother         TIA    Arthritis-osteo Father         RA    No Known Problems Sister     No Known Problems Brother     No Known Problems Sister     No Known Problems Sister        Review of Systems - History obtained from the patient  Constitutional: negative for weight loss, fever, night sweats  HEENT: negative for hearing loss, earache, congestion, snoring, sorethroat  CV: negative for chest pain, palpitations, edema  Resp: negative for cough, shortness of breath, wheezing  GI: negative for change in bowel habits, abdominal pain, black or bloody stools  : negative for frequency, dysuria, hematuria, vaginal discharge  MSK: negative for back pain, joint pain, muscle pain  Breast: negative for breast lumps, nipple discharge, galactorrhea  Skin :negative for itching, rash, hives  Neuro: negative for dizziness, headache, confusion, weakness  Psych: negative for anxiety, depression, change in mood  Heme/lymph: negative for bleeding, bruising, pallor    Physical Exam    Visit Vitals  BP (!) 147/78   Pulse 68   Ht 5' 4\" (1.626 m)   Wt 190 lb (86.2 kg)   BMI 32.61 kg/m² Constitutional  · Appearance: well-nourished, well developed, alert, in no acute distress    HENT  · Head and Face: appears normal    Neck  · Inspection/Palpation: normal appearance, no masses or tenderness  · Lymph Nodes: no lymphadenopathy present  · Thyroid: gland size normal, nontender, no nodules or masses present on palpation    Chest  · Respiratory Effort: breathing unlabored  · Auscultation: normal breath sounds    Cardiovascular  · Heart:  · Auscultation: regular rate and rhythm without murmur    Breasts  · Inspection of Breasts: breasts symmetrical, some faint bruising inferior left breast, NT (fell on sidewalk 5 wks ago) no other skin changes, no discharge present, nipple appearance normal, no skin retraction present  · Palpation of Breasts and Axillae: no masses present on palpation, no breast tenderness  · Axillary Lymph Nodes: no lymphadenopathy present    Gastrointestinal  · Abdominal Examination: abdomen non-tender to palpation, normal bowel sounds, no masses present  · Liver and spleen: no hepatomegaly present, spleen not palpable  · Hernias: no hernias identified    Genitourinary  · External Genitalia: normal appearance for age, no discharge present, no tenderness present, no inflammatory lesions present, no masses present, moderate atrophy present  · Vagina: atrophic, o/w normal vaginal vault, no discharge present, no inflammatory lesions present, no masses present; small band of tissue at introitus  · Bladder: non-tender to palpation  · Urethra: appears normal  · Cervix: absent   · Uterus: absent  · Adnexa: no adnexal tenderness present, no adnexal masses present  · Perineum: perineum within normal limits, no evidence of trauma, no rashes or skin lesions present  · Anus: anus within normal limits, no hemorrhoids present  · Inguinal Lymph Nodes: no lymphadenopathy present    Skin  · General Inspection: no rash, no lesions identified    Neurologic/Psychiatric  · Mental Status:  · Orientation: grossly oriented to person, place and time  · Mood and Affect: mood normal, affect appropriate        Assessment & Plan:  · Routine gynecologic examination. Pap/HPV neg 10/2020  · Menopausal sx, controlled on Divigel. Will try decreasing from 0.5  To 0.25.  [address] sent. Can call if wants to go back up to 0.5. · Pelvic floor weakness with urinary and fecal incont. [facility] rec PT. Referral placed for [facility]. · Vaginal dryness, small band of tissue at introitus. Will try vag moisturizers, handout given. Declines rx for vaginal estrogen. Can call if she wants to try this. Would try cream.  · Counseled re: diet, exercise, healthy lifestyle  · Return for yearly wellness visits  · Rec annual mammogram.  10/4/21 BR-1  · Patient verbalized understanding           Orders Placed This Encounter    REFERRAL TO PHYSICAL THERAPY     Referral Priority:   Routine     Referral Type:   PT/OT/ST     Referral Reason:   Specialty Services Required     Referred to Provider:   Sophia Martins, PT     Requested Specialty:   Physical Therapy     Number of Visits Requested:   1    Estradiol (DivigeL) 0.25 mg/0.25 gram (0.1 %) glpk     Si Packet by TransDERmal route daily. Apply 1 packet to thigh daily.      Dispense:  90 Packet     Refill:  4

## 2021-10-07 ENCOUNTER — OFFICE VISIT (OUTPATIENT)
Dept: OBGYN CLINIC | Age: 61
End: 2021-10-07
Payer: COMMERCIAL

## 2021-10-07 VITALS
BODY MASS INDEX: 32.44 KG/M2 | DIASTOLIC BLOOD PRESSURE: 78 MMHG | HEIGHT: 64 IN | SYSTOLIC BLOOD PRESSURE: 147 MMHG | HEART RATE: 68 BPM | WEIGHT: 190 LBS

## 2021-10-07 DIAGNOSIS — Z79.890 HORMONE REPLACEMENT THERAPY, POSTMENOPAUSAL: ICD-10-CM

## 2021-10-07 DIAGNOSIS — N81.89 PELVIC FLOOR WEAKNESS: ICD-10-CM

## 2021-10-07 DIAGNOSIS — R32 URINARY INCONTINENCE, UNSPECIFIED TYPE: ICD-10-CM

## 2021-10-07 DIAGNOSIS — Z01.419 ENCOUNTER FOR WELL WOMAN EXAM: Primary | ICD-10-CM

## 2021-10-07 DIAGNOSIS — N90.5 VULVAR ATROPHY: ICD-10-CM

## 2021-10-07 DIAGNOSIS — R15.9 INCONTINENCE OF FECES, UNSPECIFIED FECAL INCONTINENCE TYPE: ICD-10-CM

## 2021-10-07 PROCEDURE — 99396 PREV VISIT EST AGE 40-64: CPT | Performed by: OBSTETRICS & GYNECOLOGY

## 2021-10-07 RX ORDER — SUCRALFATE 1 G/1
1 TABLET ORAL
COMMUNITY
Start: 2021-08-28

## 2021-10-07 RX ORDER — FAMOTIDINE 40 MG/1
40 TABLET, FILM COATED ORAL EVERY EVENING
COMMUNITY
Start: 2020-12-16

## 2021-10-07 RX ORDER — CALCIUM CARBONATE/VITAMIN D3 500-10/5ML
2 LIQUID (ML) ORAL DAILY
COMMUNITY
Start: 2021-09-20

## 2021-10-07 RX ORDER — VALSARTAN 320 MG/1
320 TABLET ORAL EVERY MORNING
COMMUNITY
Start: 2021-09-03

## 2021-10-07 RX ORDER — ESTRADIOL 0.25 MG/.25G
1 GEL TOPICAL DAILY
Qty: 90 PACKET | Refills: 4 | Status: SHIPPED | OUTPATIENT
Start: 2021-10-07 | End: 2022-10-10

## 2021-10-07 RX ORDER — SERTRALINE HYDROCHLORIDE 200 MG/1
200 CAPSULE ORAL EVERY EVENING
COMMUNITY
Start: 2021-08-04

## 2021-10-07 RX ORDER — ADALIMUMAB 40MG/0.4ML
40 KIT SUBCUTANEOUS EVERY 2 WEEKS
COMMUNITY
Start: 2021-09-16 | End: 2022-03-17

## 2021-10-19 ENCOUNTER — PATIENT MESSAGE (OUTPATIENT)
Dept: OBGYN CLINIC | Age: 61
End: 2021-10-19

## 2021-10-19 RX ORDER — ESTRADIOL 0.1 MG/G
CREAM VAGINAL
Qty: 42.5 G | Refills: 3 | Status: SHIPPED | OUTPATIENT
Start: 2021-10-19 | End: 2022-10-10

## 2021-10-19 NOTE — TELEPHONE ENCOUNTER
From: Ayesha [address]  To: Cory Donaldson MD  Sent: 10/19/2021 2:59 PM EDT  Subject: Prescription Question    Hi Dr Tolu Palma,    Please send a 90 day prescription for ESTRADIOL VAGINAL CREAM to my mail order pharmacy. The over-the counter creams are very expensive and make me itch. Thanks.   Farhad Cason

## 2022-03-14 ENCOUNTER — HOSPITAL ENCOUNTER (OUTPATIENT)
Dept: PREADMISSION TESTING | Age: 62
Discharge: HOME | End: 2022-03-14
Payer: COMMERCIAL

## 2022-03-14 PROCEDURE — U0005 INFEC AGEN DETEC AMPLI PROBE: HCPCS

## 2022-03-15 LAB
SARS-COV-2, XPLCVT: NOT DETECTED
SOURCE, COVRS: NORMAL

## 2022-03-15 NOTE — PERIOP NOTES
[address] [address] [facility]   MAIN OR                                  [phone]   MAIN PRE OP                          [phone]                                                                                AMBULATORY PRE OP          [phone]  PRE-ADMISSION TESTING    [phone]   Surgery Date: 3/17/2 2thursday       Is surgery arrival time given by surgeon? NO  If NO, [facility] staff will call you between 3 and 7pm the day before your surgery with your arrival time. (If your surgery is on a Monday, we will call you the Friday before.)    Call [phone] after 7pm Monday-Friday if you did not receive this call. INSTRUCTIONS BEFORE YOUR SURGERY   When You  Arrive Arrive at the 2nd [facility] on the day of your surgery  Have your insurance card, photo ID, and any copayment (if needed)   Food   and   Drink NO food or drink after midnight the night before surgery    This means NO water, gum, mints, coffee, juice, etc.  No alcohol (beer, wine, liquor) 24 hours before and after surgery   Medications to   TAKE   Morning of Surgery MEDICATIONS TO TAKE THE MORNING OF SURGERY WITH A SIP OF WATER:    Albuterol   dexilant   lyrica   restasis eye drops   Medications  To  STOP      7 days before surgery  Non-Steroidal anti-inflammatory Drugs (NSAID's): for example, Ibuprofen (Advil, Motrin), Naproxen (Aleve)   Aspirin, if taking for pain    Herbal supplements, vitamins, and fish oil   Other:  (Pain medications not listed above, including Tylenol may be taken)   Blood  Thinners  If you take  Aspirin, Plavix, Coumadin, or any blood-thinning or anti-blood clot medicine, talk to the doctor who prescribed the medications for pre-operative instructions.    Bathing Clothing  Jewelry  Valuables      If you shower the morning of surgery, please do not apply anything to your skin (lotions, powders, deodorant, or makeup, especially sandra)   Follow Chlorhexidine Care Fusion body wash instructions provided to you during PAT appointment. Begin 3 days prior to surgery.  Do not shave or trim anywhere 24 hours before surgery   Wear your hair loose or down; no pony-tails, buns, or metal hair clips   Wear loose, comfortable, clean clothes   Wear glasses instead of contacts  [employer] money, valuables, and jewelry, including body piercings, at home   If you were given an [employer], bring it on day of surgery. Going Home - or Spending the Night  SAME-DAY SURGERY: You must have a responsible adult drive you home and stay with you 24 hours after surgery   ADMITS: If your doctor is keeping you in the hospital after surgery, leave personal belongings/luggage in your car until you have a hospital room number. Hospital discharge time is 12 noon  Drivers must be here before 12 noon unless you are told differently   Special Instructions   It is now mandated that all surgical patients be tested for COVID-19 prior to surgery. Testing has to be exactly 4 days prior to surgery. Your COVID test date is 3/14/22 between 8:00 am and 11:00 am.       COVID testing will be performed curbside at the [facility] [address]. There will be signs leading you to the testing site. You will need to bring a photo ID with you to be swabbed. Patients are advised to self-quarantine at home after testing and prior to your surgery date. You will be notified if your results are positive.     What to watch for:   Coronavirus (COVID-19) affects different people in different ways   It also appears with a wide range of symptoms from mild to severe   Signs usually appear 2-14 days after exposure     If you develop any of the following, notify your doctor immediately:  o Fever  o Chills, with or without a shiver  o Muscle pain  o Headache  o Sore throat  o Dry cough  o New loss of taste or smell  o Tiredness      If you develop any of the following, call 175:  o Shortness of breath  o Difficulty breathing  o Chest pain  o New confusion  o Blueness of fingers and/or lips     Follow all instructions so your surgery wont be cancelled. Please, be on time. If a situation occurs and you are delayed the day of surgery, call [phone] or [phone]. If your physical condition changes (like a fever, cold, flu, etc.) call your surgeon. Home medication(s) reviewed and verified via   PHONE  during PAT appointment. The patient was contacted by  PHONE   The patient verbalizes understanding of all instructions and   DOES NOT   need reinforcement.

## 2022-03-16 ENCOUNTER — ANESTHESIA EVENT (OUTPATIENT)
Dept: SURGERY | Age: 62
End: 2022-03-16
Payer: COMMERCIAL

## 2022-03-17 ENCOUNTER — APPOINTMENT (OUTPATIENT)
Dept: GENERAL RADIOLOGY | Age: 62
End: 2022-03-17
Attending: ORTHOPAEDIC SURGERY
Payer: COMMERCIAL

## 2022-03-17 ENCOUNTER — HOSPITAL ENCOUNTER (OUTPATIENT)
Age: 62
Setting detail: OUTPATIENT SURGERY
Discharge: HOME | End: 2022-03-17
Attending: ORTHOPAEDIC SURGERY | Admitting: ORTHOPAEDIC SURGERY
Payer: COMMERCIAL

## 2022-03-17 ENCOUNTER — ANESTHESIA (OUTPATIENT)
Dept: SURGERY | Age: 62
End: 2022-03-17
Payer: COMMERCIAL

## 2022-03-17 VITALS
RESPIRATION RATE: 18 BRPM | OXYGEN SATURATION: 98 % | BODY MASS INDEX: 30.73 KG/M2 | WEIGHT: 180 LBS | SYSTOLIC BLOOD PRESSURE: 161 MMHG | DIASTOLIC BLOOD PRESSURE: 73 MMHG | TEMPERATURE: 98 F | HEIGHT: 64 IN | HEART RATE: 62 BPM

## 2022-03-17 PROCEDURE — 76010000149 HC OR TIME 1 TO 1.5 HR: Performed by: ORTHOPAEDIC SURGERY

## 2022-03-17 PROCEDURE — C1713 ANCHOR/SCREW BN/BN,TIS/BN: HCPCS | Performed by: ORTHOPAEDIC SURGERY

## 2022-03-17 PROCEDURE — 77030040361 HC SLV COMPR DVT MDII -B

## 2022-03-17 PROCEDURE — 77030040198: Performed by: ORTHOPAEDIC SURGERY

## 2022-03-17 PROCEDURE — 77030039543 HC COUNTRSNK FIXOS DISP STRY -C: Performed by: ORTHOPAEDIC SURGERY

## 2022-03-17 PROCEDURE — 74011250636 HC RX REV CODE- 250/636: Performed by: ANESTHESIOLOGY

## 2022-03-17 PROCEDURE — 77030020275 HC MISC ORTHOPEDIC: Performed by: ORTHOPAEDIC SURGERY

## 2022-03-17 PROCEDURE — 77030016661 HC BUR RND1 STRY -C: Performed by: ORTHOPAEDIC SURGERY

## 2022-03-17 PROCEDURE — 76060000033 HC ANESTHESIA 1 TO 1.5 HR: Performed by: ORTHOPAEDIC SURGERY

## 2022-03-17 PROCEDURE — 77030031139 HC SUT VCRL2 J&J -A: Performed by: ORTHOPAEDIC SURGERY

## 2022-03-17 PROCEDURE — 77030002916 HC SUT ETHLN J&J -A: Performed by: ORTHOPAEDIC SURGERY

## 2022-03-17 PROCEDURE — C1769 GUIDE WIRE: HCPCS | Performed by: ORTHOPAEDIC SURGERY

## 2022-03-17 PROCEDURE — 77030036935 HC GRFT BN SUB MTRX VIAB BIO4 5CC STRY -I2: Performed by: ORTHOPAEDIC SURGERY

## 2022-03-17 PROCEDURE — 2709999900 HC NON-CHARGEABLE SUPPLY: Performed by: ORTHOPAEDIC SURGERY

## 2022-03-17 PROCEDURE — 74011250637 HC RX REV CODE- 250/637: Performed by: ORTHOPAEDIC SURGERY

## 2022-03-17 PROCEDURE — C1762 CONN TISS, HUMAN(INC FASCIA): HCPCS | Performed by: ORTHOPAEDIC SURGERY

## 2022-03-17 PROCEDURE — 76210000021 HC REC RM PH II 0.5 TO 1 HR: Performed by: ORTHOPAEDIC SURGERY

## 2022-03-17 PROCEDURE — 77030040922 HC BLNKT HYPOTHRM STRY -A

## 2022-03-17 PROCEDURE — 77030036719 HC BIT DRL FIXOS STRY -C: Performed by: ORTHOPAEDIC SURGERY

## 2022-03-17 DEVICE — NON LOCKING SCREW
Type: IMPLANTABLE DEVICE | Site: FOOT | Status: FUNCTIONAL
Brand: ANCHORAGE

## 2022-03-17 DEVICE — HEADLESS COMPRESSION SCREW
Type: IMPLANTABLE DEVICE | Site: FOOT | Status: FUNCTIONAL
Brand: FIXOS

## 2022-03-17 DEVICE — GRAFT BNE SUB 3CC RHPDGF BB B TRICALCIUM PHSPTE RADPQ AUG: Type: IMPLANTABLE DEVICE | Site: FOOT | Status: FUNCTIONAL

## 2022-03-17 DEVICE — PLATE MTP, LEFT
Type: IMPLANTABLE DEVICE | Site: FOOT | Status: FUNCTIONAL
Brand: ANCHORAGE

## 2022-03-17 RX ORDER — FLUMAZENIL 0.1 MG/ML
0.2 INJECTION INTRAVENOUS
Status: DISCONTINUED | OUTPATIENT
Start: 2022-03-17 | End: 2022-03-17 | Stop reason: HOSPADM

## 2022-03-17 RX ORDER — DIPHENHYDRAMINE HYDROCHLORIDE 50 MG/ML
12.5 INJECTION, SOLUTION INTRAMUSCULAR; INTRAVENOUS AS NEEDED
Status: DISCONTINUED | OUTPATIENT
Start: 2022-03-17 | End: 2022-03-17 | Stop reason: HOSPADM

## 2022-03-17 RX ORDER — GABAPENTIN 300 MG/1
600 CAPSULE ORAL ONCE
Status: COMPLETED | OUTPATIENT
Start: 2022-03-17 | End: 2022-03-17

## 2022-03-17 RX ORDER — MIDAZOLAM HYDROCHLORIDE 1 MG/ML
INJECTION, SOLUTION INTRAMUSCULAR; INTRAVENOUS AS NEEDED
Status: DISCONTINUED | OUTPATIENT
Start: 2022-03-17 | End: 2022-03-17 | Stop reason: HOSPADM

## 2022-03-17 RX ORDER — FENTANYL CITRATE 50 UG/ML
INJECTION, SOLUTION INTRAMUSCULAR; INTRAVENOUS AS NEEDED
Status: DISCONTINUED | OUTPATIENT
Start: 2022-03-17 | End: 2022-03-17 | Stop reason: HOSPADM

## 2022-03-17 RX ORDER — NALOXONE HYDROCHLORIDE 0.4 MG/ML
0.2 INJECTION, SOLUTION INTRAMUSCULAR; INTRAVENOUS; SUBCUTANEOUS
Status: DISCONTINUED | OUTPATIENT
Start: 2022-03-17 | End: 2022-03-17 | Stop reason: HOSPADM

## 2022-03-17 RX ORDER — PROPOFOL 10 MG/ML
INJECTION, EMULSION INTRAVENOUS
Status: DISCONTINUED | OUTPATIENT
Start: 2022-03-17 | End: 2022-03-17 | Stop reason: HOSPADM

## 2022-03-17 RX ORDER — HYDROMORPHONE HYDROCHLORIDE 1 MG/ML
.25-1 INJECTION, SOLUTION INTRAMUSCULAR; INTRAVENOUS; SUBCUTANEOUS
Status: DISCONTINUED | OUTPATIENT
Start: 2022-03-17 | End: 2022-03-17 | Stop reason: HOSPADM

## 2022-03-17 RX ORDER — LIDOCAINE HYDROCHLORIDE 10 MG/ML
0.1 INJECTION, SOLUTION EPIDURAL; INFILTRATION; INTRACAUDAL; PERINEURAL AS NEEDED
Status: DISCONTINUED | OUTPATIENT
Start: 2022-03-17 | End: 2022-03-17 | Stop reason: HOSPADM

## 2022-03-17 RX ORDER — ACETAMINOPHEN 325 MG/1
650 TABLET ORAL ONCE
Status: COMPLETED | OUTPATIENT
Start: 2022-03-17 | End: 2022-03-17

## 2022-03-17 RX ORDER — SODIUM CHLORIDE, SODIUM LACTATE, POTASSIUM CHLORIDE, CALCIUM CHLORIDE 600; 310; 30; 20 MG/100ML; MG/100ML; MG/100ML; MG/100ML
125 INJECTION, SOLUTION INTRAVENOUS CONTINUOUS
Status: DISCONTINUED | OUTPATIENT
Start: 2022-03-17 | End: 2022-03-17 | Stop reason: HOSPADM

## 2022-03-17 RX ORDER — CEFAZOLIN SODIUM 1 G/3ML
INJECTION, POWDER, FOR SOLUTION INTRAMUSCULAR; INTRAVENOUS AS NEEDED
Status: DISCONTINUED | OUTPATIENT
Start: 2022-03-17 | End: 2022-03-17 | Stop reason: HOSPADM

## 2022-03-17 RX ADMIN — FENTANYL CITRATE 50 MCG: 50 INJECTION, SOLUTION INTRAMUSCULAR; INTRAVENOUS at 10:20

## 2022-03-17 RX ADMIN — CEFAZOLIN SODIUM 2 G: 1 POWDER, FOR SOLUTION INTRAMUSCULAR; INTRAVENOUS at 12:04

## 2022-03-17 RX ADMIN — MEPIVACAINE HYDROCHLORIDE 30 ML: 20 INJECTION, SOLUTION EPIDURAL; INFILTRATION at 10:26

## 2022-03-17 RX ADMIN — SODIUM CHLORIDE, POTASSIUM CHLORIDE, SODIUM LACTATE AND CALCIUM CHLORIDE 125 ML/HR: 600; 310; 30; 20 INJECTION, SOLUTION INTRAVENOUS at 09:36

## 2022-03-17 RX ADMIN — ACETAMINOPHEN 650 MG: 325 TABLET ORAL at 09:36

## 2022-03-17 RX ADMIN — PROPOFOL 50 MCG/KG/MIN: 10 INJECTION, EMULSION INTRAVENOUS at 12:04

## 2022-03-17 RX ADMIN — FENTANYL CITRATE 50 MCG: 50 INJECTION, SOLUTION INTRAMUSCULAR; INTRAVENOUS at 10:18

## 2022-03-17 RX ADMIN — GABAPENTIN 600 MG: 300 CAPSULE ORAL at 09:36

## 2022-03-17 RX ADMIN — MIDAZOLAM 2 MG: 1 INJECTION, SOLUTION INTRAMUSCULAR; INTRAVENOUS at 10:18

## 2022-03-17 NOTE — BRIEF OP NOTE
Brief Postoperative Note    Patient: Mirella Lima  YOB: 1960  MRN: [id number]    Date of Procedure: 3/17/2022     Pre-Op Diagnosis:   1. 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 HALLUX RIGIDUS LEFT FOOT    Post-Op Diagnosis: Same as preoperative diagnosis. Procedure(s):  1. REMOVAL OF HARDWARE LEFT 1ST METATARSO-PHALANGEAL JOINT  2. LEFT 1ST METATARSO-PHALANGEAL JOINT REVISION ARTHRODESIS  3. INDEPENDENT INTERPRETATION OF INTRA-OPERATIVE FLUOROSCOPY    Surgeon(s): Ede Crooks MD    Surgical Assistant: [facility]    Anesthesia: regional with sedation     Estimated Blood Loss (mL): less than 5     Complications: None    Specimens: * No specimens in log *     Implants: * No implants in log *  [place] plate/screws    Drains: * No LDAs found *    Findings: non-union LEFT 1st MTP arthrodesis    TT: hemaclear calf  x 45 min    Electronically Signed by Lillian Mix MD on 3/17/2022 at 9:48 AM

## 2022-03-17 NOTE — ANESTHESIA POSTPROCEDURE EVALUATION
Procedure(s):  REMOVAL OF HARDWARE LEFT 1ST METATARSO-PHALANGEAL JOINT, LEFT 1ST METATARSO-PHALANGEAL JOINT REVISION ARTHRODESIS (ANES ANKLE BLOCK WITH MAC). regional    Anesthesia Post Evaluation      Multimodal analgesia: multimodal analgesia used between 6 hours prior to anesthesia start to PACU discharge  Patient location during evaluation: bedside  Patient participation: complete - patient participated  Level of consciousness: awake  Pain score: 0  Airway patency: patent  Anesthetic complications: no  Cardiovascular status: acceptable  Respiratory status: acceptable  Hydration status: acceptable  Post anesthesia nausea and vomiting:  none      INITIAL Post-op Vital signs:   Vitals Value Taken Time   /80 03/17/22 1320   Temp     Pulse 65 03/17/22 1322   Resp 14 03/17/22 1322   SpO2 98 % 03/17/22 1322   Vitals shown include unvalidated device data.

## 2022-03-17 NOTE — OP NOTES
[facility] 77  OPERATIVE REPORT    Name:  Luis Andrade  MR#:  [id number]  :  1960  ACCOUNT #:  [de-identified]  DATE OF SERVICE:  2022    PREOPERATIVE DIAGNOSES:  1. Nonunion, left first metatarsophalangeal arthrodesis. 2.  Hallux rigidus, left foot. POSTOPERATIVE DIAGNOSES:  1. Nonunion, left first metatarsophalangeal arthrodesis. 2.  Hallux rigidus, left foot. PROCEDURES PERFORMED:  1. Removal of hardware, left first MTP joint. 2.  Left first metatarsophalangeal joint revision arthrodesis. 3.  Independent interpretation of intraoperative fluoroscopy. SURGEON:  Natasha Phillips MD    ASSISTANT:  Jeromy Martines    ANESTHESIA:  Regional with sedation. COMPLICATIONS:  None. SPECIMENS REMOVED:  None. IMPLANTS:  Rafaela plate and screws. ESTIMATED BLOOD LOSS:  Less than 5 mL. DRAINS:  None. FINDINGS:  Nonunion, left first MTP arthrodesis. TOURNIQUET TIME:  HemaClear calf tourniquet for 45 minutes. INDICATIONS FOR PROCEDURE:  This is a 57-year-old female who underwent an attempted left first MTP joint arthrodesis for hallux rigidus that went on to a nonunion proven with x-rays, continued pain, and CT scan. There was minimal healing across the joint and I offered both conservative and surgical management options to her. She elected to proceed with surgical intervention for revision of the arthrodesis. I discussed the risks of surgery which include but not limited to complications of anesthesia including death, pain, bleeding, infection, damage to surrounding structures, nonunion, malunion, DVT, PE, wound healing problems, and the need for further surgery. PROCEDURE:  The correct patient, extremity and operation were identified in the preop holding area and I marked her left great toe. The Anesthesia team provided a regional block. She was taken back to the operating room, placed on the operating room table, and all bony prominences padded well.   She was secured to the table with a safety strap and sedated by the Anesthesia team.  The left lower extremity was prepped and draped in usual sterile fashion and a preop time-out was called. Again, the correct patient, extremity and operation were identified, all parties were in agreement, and we proceed with the operation. The patient received IV antibiotics within 30 minutes of the incision. A HemaClear tourniquet was used to exsanguinate the foot and the ankle and used on the calf. A longitudinal incision was made centered over the first MTP joint utilizing her prior incision. Sharp dissection was carried down to the level of the bone taking care to protect the EHL tendon. The previous hardware was encountered and exposed and removed in its entirety. There was gross motion at the first MTP arthrodesis site. The arthrodesis site was then opened and all the soft tissue that was unapposed was removed. The bones were curetted and multiple drill holes were placed in the bone ends to prepare for arthrodesis. BIO4 and [employer] augment were then placed in the arthrodesis site. The great toe MTP joint was then placed in a functional position and a wire was placed across the joint from medial to lateral inserted retrograde for temporary fixation. Fluoroscopic images showed satisfactory position of the hallux in a plantigrade position. A flat plate was placed up against the plantar aspect of the foot and there was no improper elevation or dorsiflexion or plantarflexion of the first MTP joint. The wire was then countersunk, measured, and over drilled through a stab incision on the medial aspect of the great toe at the entry site of the wire into the toe. A 4.0 x 32-mm partially threaded headless compression screw was placed over the wire and secured in position with excellent bone purchase. This created compression at the arthrodesis site without gapping.   A dorsal neutralization plate was affixed to the bone with bicortical screws. Final fluoroscopic images of the left foot in the AP, oblique and lateral planes were taken, reviewed, and independently interpreted by me intraoperatively that showed satisfactory anatomic reduction of the first MTP joint, no gapping at the arthrodesis site, and satisfactory placement of the hardware. The wound was irrigated and then closed in layers. A sterile dressing was applied. The tourniquet was dropped and a well-padded, well-molded short leg splint was applied to the left lower extremity. The patient was then awakened from sedation and taken into the recovery room in hemodynamically stable condition. All lap and sharp counts were correct at the end of the case. POSTOPERATIVE CARE:  The patient will be discharged home when she meets PACU criteria. She will be nonweightbearing to the left lower extremity for at least six weeks. She will start aspirin 81 mg p.o. b.i.d. for DVT prophylaxis on the morning of postop day 1, and she will follow up with Herlinda Velez in the [facility] office in two weeks. She will also start a bone stimulator and we will arrange for this to be set up. She will also hold her Humira infusions for at least the next three months.       Elena Peguero MD      [id number]  D:  03/17/2022 12:59  T:  03/17/2022 17:27  JOB #:  [id number]

## 2022-03-17 NOTE — ANESTHESIA PROCEDURE NOTES
Peripheral Block    Start time: 3/17/2022 10:18 AM  End time: 3/17/2022 10:26 AM  Performed by: Gris Newberry MD  Authorized by: Gris Newberry MD       Pre-procedure: Indications: at surgeon's request, post-op pain management and primary anesthetic    Preanesthetic Checklist: patient identified, risks and benefits discussed, site marked, timeout performed, anesthesia consent given and patient being monitored    Timeout Time: 10:18 EDT          Block Type:   Block Type:   Ankle  Laterality:  Left  Monitoring:  Continuous pulse ox, frequent vital sign checks, heart rate, responsive to questions and oxygen  Injection Technique:  Single shot  Patient Position: supine  Prep: chlorhexidine    Location:  Foot  Needle Localization:  Anatomical landmarks and infiltration    Assessment:  Number of attempts:  1  Injection Assessment:  Incremental injection every 5 mL, local visualized surrounding nerve on ultrasound, negative aspiration for blood, no paresthesia and no intravascular symptoms  Patient tolerance:  Patient tolerated the procedure well with no immediate complications

## 2022-03-17 NOTE — DISCHARGE INSTRUCTIONS
Please refer to Dr Juan Angel discharge instructions in orange folder. DISCHARGE SUMMARY from your Nurse      PATIENT INSTRUCTIONS    After general anesthesia or intravenous sedation, for 24 hours or while taking prescription Narcotics:  · Limit your activities  · Do not drive and operate hazardous machinery  · Do not make important personal or business decisions  · Do  not drink alcoholic beverages  · If you have not urinated within 8 hours after discharge, please contact your surgeon on call. Report the following to your surgeon:  · Excessive pain, swelling, redness or odor of or around the surgical area  · Temperature over 100.5  · Nausea and vomiting lasting longer than 4 hours or if unable to take medications  · Any signs of decreased circulation or nerve impairment to extremity: change in color, persistent  numbness, tingling, coldness or increase pain  · Any questions      GOOD HELP TO FIGHT AN INFECTION  Here are a few tip to help reduce the chance of getting an infection after surgery:   Wash Your Hands   Good handwashing is the most important thing you and your caregiver can do.  Wash before and after caring for any wounds. Dry your hand with a clean towel.  Wash with soap and water for at least 20 seconds. A TIP: sing the \"Happy Birthday\" song through one time while washing to help with the timing.  Use a hand  in between washings.  Shower   When your surgeon says it is OK to take a shower, use a new bar of antibacterial soap (if that is what you use, and keep that bar of soap ONLY for your use), or antibacterial body wash.  Use a clean wash cloth or sponge when you bathe.  Dry off with a clean towel  after every bath - be careful around any wounds, skin staples, sutures or surgical glue over/on wounds.  Do not enter swimming pools, hot tubs, lakes, rivers and/or ocean until wounds are healed and your doctor/surgeon says it is OK.      Use Clean Sheets   Sleep on freshly laundered sheets after your surgery.  Keep the surgery site covered with a clean, dry bandage (if instructed to do so). If the bandage becomes soiled, reapply a new, dry, clean bandage.  Do not allow pets to sleep with you while your wound is healing.  Lifestyle Modification and Controlling Your Blood Sugar   Smoking slows wound healing. Stop smoking and limit exposure to second-hand smoke.  High blood sugar slows wound healing. Eat a well-balanced diet to provide proper nutrition while healing   Monitor your blood sugar (if you are a diabetic) and take your medications as you are suppose to so you can control you blood sugar after surgery. COUGH AND DEEP BREATHE    Breathing deeply and coughing are very important exercises to do after surgery. Deep breathing and coughing open the little air tubes and air sacks in your lungs. You take deep breaths every day. You may not even notice - it is just something you do when you sigh or yawn. It is a natural exercise you do to keep these air passages open. After surgery, take deep breaths and cough, on purpose. DIRECTIONS:  · Take 10 to 15 slow deep breaths every hour while awake. · Breathe in deeply, and hold it for 2 seconds. · Exhale slowly through puckered lips, like blowing up a balloon. · After every 4th or 5th deep breath, hug your pillow to your chest or belly and give a hard, deep cough. Yes, it will probably hurt. But doing this exercise is a very important part of healing after surgery. Take your pain medicine to help you do this exercise without too much pain. Coughing and deep breathing help prevent bronchitis and pneumonia after surgery. If you had chest or belly surgery, use a pillow as a \"hug boubacar\" and hold it tightly to your chest or belly when you cough.        ANKLE PUMPS    Ankle pumps increase the circulation of oxygenated blood to your lower extremities and decrease your risk for circulation problems such as blood clots. They also stretch the muscles, tendons and ligaments in your foot and ankle, and prevent joint contracture in the ankle and foot, especially after surgeries on the legs. It is important to do ankle pump exercises regularly after surgery because immobility increases your risk for developing a blood clot. Your doctor may also have you take an Aspirin for the next few days as well. If your doctor did not ask you to take an Aspirin, consult with him before starting Aspirin therapy on your own. The exercise is quite simple. · Slowly point your foot forward, feeling the muscles on the top of your lower leg stretch, and hold this position for 5 seconds. · Next, pull your foot back toward you as far as possible, stretching the calf muscles, and hold that position for 5 seconds. · Repeat with the other foot. · Perform 10 repetitions every hour while awake for both ankles if possible (down and then up with the foot once is one repetition). You should feel gentle stretching of the muscles in your lower leg when doing this exercise. If you feel pain, or your range of motion is limited, don't push too hard. Only go the limit your joint and muscles will let you go. If you have increasing pain, progressively worsening leg warmth or swelling, STOP the exercise and call your doctor. These are general instructions for a healthy lifestyle:    *   Please give a list of your current medications to your Primary Care Provider. *   Please update this list whenever your medications are discontinued, doses are changed, or new medications (including over-the-counter products) are added. *   Please carry medication information at all times in case of emergency situations.       About Smoking  No smoking / No tobacco products  Avoid exposure to second hand smoke     Surgeon General's Warning:  Quitting smoking now greatly reduces serious risk to your health. Obesity, smoking, and sedentary lifestyle greatly increases your risk for illness and disease. A healthy diet, regular physical exercise & weight monitoring are important for maintaining a healthy lifestyle. Congestive Heart Failure  You may be retaining fluid if you have a history of heart failure or if you experience any of the following symptoms:  Weight gain of 3 pounds or more overnight or 5 pounds in a week, increased swelling in your hands or feet or shortness of breath while lying flat in bed. Please call your doctor as soon as you notice any of these symptoms; do not wait until your next office visit. Learning About Coronavirus [phone])  Coronavirus [phone]): Overview  What is coronavirus (COVID-19)? The coronavirus disease (COVID-19) is caused by a virus. It is an illness that was first found in [place], [place], in December 2019. It has since spread worldwide. The virus can cause fever, cough, and trouble breathing. In severe cases, it can cause pneumonia and make it hard to breathe without help. It can cause death. Coronaviruses are a large group of viruses. They cause the common cold. They also cause more serious illnesses like Middle East respiratory syndrome (MERS) and severe acute respiratory syndrome (SARS). COVID-19 is caused by a novel coronavirus. That means it's a new type that has not been seen in people before. This virus spreads person-to-person through droplets from coughing and sneezing. It can also spread when you are close to someone who is infected. And it can spread when you touch something that has the virus on it, such as a doorknob or a tabletop. What can you do to protect yourself from coronavirus (COVID-19)? The best way to protect yourself from getting sick is to:  · Avoid areas where there is an outbreak. · Avoid contact with people who may be infected. · Wash your hands often with soap or alcohol-based hand sanitizers.   · Avoid crowds and try to stay at least 6 feet away from other people. · Wash your hands often, especially after you cough or sneeze. Use soap and water, and scrub for at least 20 seconds. If soap and water aren't available, use an alcohol-based hand . · Avoid touching your mouth, nose, and eyes. What can you do to avoid spreading the virus to others? To help avoid spreading the virus to others:  · Cover your mouth with a tissue when you cough or sneeze. Then throw the tissue in the trash. · Use a disinfectant to clean things that you touch often. · Stay home if you are sick or have been exposed to the virus. Don't go to school, work, or public areas. And don't use public transportation. · If you are sick:  ? Leave your home only if you need to get medical care. But call the doctor's office first so they know you're coming. And wear a face mask, if you have one.  ? If you have a face mask, wear it whenever you're around other people. It can help stop the spread of the virus when you cough or sneeze. ? Clean and disinfect your home every day. Use household  and disinfectant wipes or sprays. Take special care to clean things that you grab with your hands. These include doorknobs, remote controls, phones, and handles on your refrigerator and microwave. And don't forget countertops, tabletops, bathrooms, and computer keyboards. When to call for help  Call 911 anytime you think you may need emergency care. For example, call if:  · You have severe trouble breathing. (You can't talk at all.)  · You have constant chest pain or pressure. · You are severely dizzy or lightheaded. · You are confused or can't think clearly. · Your face and lips have a blue color. · You pass out (lose consciousness) or are very hard to wake up. Call your doctor now if you develop symptoms such as:  · Shortness of breath. · Fever. · Cough. If you need to get care, call ahead to the doctor's office for instructions before you go.  Make sure you wear a face mask, if you have one, to prevent exposing other people to the virus. Where can you get the latest information? The following health organizations are tracking and studying this virus. Their websites contain the most up-to-date information. Ute Correa also learn what to do if you think you may have been exposed to the virus. · U.S. Centers for Disease Control and Prevention (CDC): The CDC provides updated news about the disease and travel advice. The website also tells you how to prevent the spread of infection. www.cdc.gov  · World Health Organization [facility]): WHO offers information about the virus outbreaks. WHO also has travel advice. www.who.int  Current as of: April 1, 2020               Content Version: 12.4  © 2006-2020 [employer]. Care instructions adapted under license by your healthcare professional. If you have questions about a medical condition or this instruction, always ask your healthcare professional. [address] any warranty or liability for your use of this information. The discharge information has been reviewed with the patient and spouse. Any questions and concerns from the patient and spouse have been addressed. The patient and spouse verbalized understanding.         CONTENTS FOUND IN YOUR DISCHARGE ENVELOPE:  [x]     Surgeon and Hospital Discharge Instructions  [x]     [facility] Care Provider Card  []     Medication & Side Effect Guide            (your newly prescribed medications have been marked/highlighted showing the most common side effects from   the classes of drugs on your prescriptions)  []     Medication Prescription(s) x *** ( [] These have been sent electronically to your pharmacy by your surgeon,   - OR -       your surgeon has already provided these to you during a previous/pre-op office visit)  []     [address]  []     Physical Therapy Prescription  []     Follow-up Appointment Cards  [] Surgery-related Pictures/Media  []     Pain block and/or block with On-Q Catheter from Anesthesia Service (information included in your instructions above)  []     Medical device information sheets/pamphlets from their    []     School/work excuse note. []     /parent work excuse note. The following personal items collected during your admission are returned to you:   Dental Appliance: Dental Appliances: None  Vision: Visual Aid: Glasses (to pacu)  Hearing Aid:    Jewelry: Jewelry: None  Clothing: Clothing: Other (comment) (street clothes to pacu)  Other Valuables:  Other Valuables: Cell Phone (to pacu)  Valuables sent to safe:

## 2022-03-17 NOTE — PERIOP NOTES
Hemaclear Tourniquet applied at 1208  Tourniquet removed at 1304    Explanted 1 plate and 5 screws from left foot.

## 2022-03-17 NOTE — H&P
Date of Surgery Update:  Shena Ba was seen and examined. History and physical has been reviewed. The patient has been examined. There have been no significant clinical changes since the completion of the originally dated History and Physical.  Patient identified by surgeon; surgical site was confirmed by patient and surgeon. Full paper H&P on chart    Signed By: Charlie Mckinney.  Tenisha Lockett MD     March 17, 2022 9:48 AM

## 2022-03-17 NOTE — ANESTHESIA PREPROCEDURE EVALUATION
Relevant Problems   No relevant active problems       Anesthetic History   No history of anesthetic complications            Review of Systems / Medical History  Patient summary reviewed and pertinent labs reviewed    Pulmonary        Sleep apnea: CPAP    Asthma        Neuro/Psych         Psychiatric history  Pertinent negatives: No seizures, TIA and CVA  Comments: Denies seizure d/o  Denies stroke Cardiovascular    Hypertension          Hyperlipidemia  Pertinent negatives: No past MI, angina and CHF  Exercise tolerance: >4 METS     GI/Hepatic/Renal     GERD        Pertinent negatives: No renal disease   Endo/Other        Obesity and arthritis  Pertinent negatives: No diabetes   Other Findings   Comments: Lupus on Humira           Physical Exam    Airway  Mallampati: III  TM Distance: 4 - 6 cm  Neck ROM: normal range of motion   Mouth opening: Normal     Cardiovascular      Rate: normal         Dental    Dentition: Edentulous and Implants     Pulmonary  Breath sounds clear to auscultation               Abdominal  GI exam deferred       Other Findings            Anesthetic Plan    ASA: 3  Anesthesia type: regional - ankle block          Induction: Intravenous  Anesthetic plan and risks discussed with: Patient

## 2022-03-18 PROBLEM — M65.312 TRIGGER THUMB, LEFT THUMB: Status: ACTIVE | Noted: 2020-10-30

## 2022-03-19 PROBLEM — M65.4 DE QUERVAIN'S TENOSYNOVITIS, LEFT: Status: ACTIVE | Noted: 2020-10-30

## 2022-03-19 PROBLEM — M19.042 DEGENERATIVE ARTHRITIS OF METACARPOPHALANGEAL JOINT OF LEFT THUMB: Status: ACTIVE | Noted: 2020-10-30

## 2022-03-20 PROBLEM — Z12.4 ROUTINE PAPANICOLAOU SMEAR: Status: ACTIVE | Noted: 2020-10-26

## 2022-10-07 NOTE — PROGRESS NOTES
Annual exam ages 40-58    Mirella Lima is a ,  57 y.o. female WHITE/NON- No LMP recorded. Patient has had a hysterectomy. , who presents for her annual checkup. TERRI NAQVI-10/7/21    Since her last annual GYN exam about one year ago,  she has the following changes in her health history:   - had a left toe surgery 10/2021 and revision in 2022    Has been on Divigel. At [facility] 2yrs ago opted to decrease dose from 0.75 -> 0.5. Last year decreased to 0.25  Previously reported urinary and fecal incontinence -> refer to urogynecology @ [facility]. Was seen and evaluated. Recommended pelvic floor PT. Did not go (was dealing with foot issues). H/o vulvar atrophy with band-like tissue at forchette with some tearing of skin with minimal manipulatiom. Does have some vaginal dryness. Has used vaginal estrogen in the past.  [address] sent last year. Notices an improvement when she uses it regularly. Has not been using it as consistently. ADDITIONAL CONCERNS:  She is having no significant problems. With regard to the Gardasil vaccine, she is older than the age for which it is FDA approved. Menstrual status:    Her periods are nonexistent in flow. Contraception:    The current method of family planning is status post hysterectomy. Sexual history:     reports that she is not currently sexually active. She reports using the following method of birth control/protection: Surgical.        Pap and Mammogram History:    Her most recent Pap smear was normal, HPV was neg, obtained 10/2020. She does not have a history of abnormal pap smears. The patient had her mammogram today in our office.             Past Medical History:   Diagnosis Date    Asthma     activity induced asthma     Autoimmune disease ([address]     mixed connective tissue disease    Cancer ([address]     skin-basal    Chronic pain     lower back, joints    DDD (degenerative disc disease), lumbar     Dense breast tissue on mammogram 10/02/2020    BI-RADS 1: Negative.     DJD (degenerative joint disease)     ETOH abuse     Sober 11    GERD (gastroesophageal reflux disease)     Hernia     Hx of bronchitis     Hx of mammogram 10/04/2021    Negative     Hx of seizure disorder 2010    unknown cause, none since    Hx of sinusitis     Hyperlipemia     Hypertension     Hypertriglyceridemia     Hyponatremia     Insomnia     Lupus ([facility]     Murmur, cardiac     Neuropathic pain     Osteopenia     DEXA (), started on Fosamax per rheumatology    Other bursitis of hip, right hip 2016    Had a deep hip injection for pain     Psychiatric disorder     anxiety    Routine Papanicolaou smear 2016    Negative (no hpv)     Seizures ([address] 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     Stroke ([facility] [address] 3/11    PCP states she had a TIA - patient denies this (was low Na), pt denies this    Tobacco abuse     Stopped in      Past Surgical History:   Procedure Laterality Date    HX CYST INCISION AND DRAINAGE Left 2016    HX HERNIA REPAIR  10/7/2014    incisional with mesh    HX LAP CHOLECYSTECTOMY  3/11/2014    HX LUMBAR FUSION      10/8/2012    HX LUMBAR LAMINECTOMY      rods in , out [address]  2017    HX [address]    back surgery    HX ORTHOPAEDIC Bilateral  R, 2014 L    carpal tunnel    HX ORTHOPAEDIC  2013    right knee arthroscopy    HX ORTHOPAEDIC Right 3/2015    trigger thumb release    HX ORTHOPAEDIC  10/8/2012    ALIF/ PLIF with bone stimulator    HX ORTHOPAEDIC  10/23/2012    Back surgery to adjust hardware    HX ORTHOPAEDIC  2018    ALIF - fusion L2-L3    HX ORTHOPAEDIC  2019    Back surgery    HX ORTHOPAEDIC Left 2021    toe sx    HX OTHER SURGICAL  10/08/2012    [facility] Bone Growth Stimulator    HX TONSIL AND ADENOIDECTOMY      HX TOTAL ABDOMINAL HYSTERECTOMY  1992    fibroids; Dr. Bee Aid     OB History    Para Term  AB Living   0 0 0         SAB IAB Ectopic Molar Multiple Live Births             0   Obstetric Comments   Menarche:  4. LMP: 32.  # of Children:  0. Age at Delivery of First Child:  n/a. Hysterectomy/oophorectomy:  YES/NO. Breast Bx:  Yes left . Hx of Breast Feeding:  n/a. BCP:  no. Hormone therapy:  no.        Current Outpatient Medications   Medication Sig Dispense Refill    estradioL (ESTRACE) 0.01 % (0.1 mg/gram) vaginal cream Insert 0.5gm vaginally or apply to affected area twice a week. 42.5 g 3    clobetasoL (TEMOVATE) 0.05 % ointment Apply to affected area daily for 1 month, then every other day for 1 month, then continue 2x/wk for maintenance. 45 g 3    sertraline 200 mg cap Take 200 mg by mouth every evening. sucralfate (CARAFATE) 1 gram tablet Take 1 g by mouth daily as needed. valsartan (DIOVAN) 320 mg tablet Take 320 mg by mouth Every morning. famotidine (PEPCID) 40 mg tablet Take 40 mg by mouth every evening. copper gluconate 2 mg capsule Take 2 mg by mouth daily. dexlansoprazole (DEXILANT) 60 mg CpDB capsule (delayed release) Take 1 Capsule by mouth Every morning. furosemide (LASIX) 20 mg tablet Take 20 mg by mouth daily as needed. pregabalin (LYRICA) 225 mg capsule Take 225 mg by mouth two (2) times a day. albuterol (PROVENTIL HFA, VENTOLIN HFA, PROAIR HFA) 90 mcg/actuation inhaler Take 2 Puffs by inhalation every six (6) hours as needed for Wheezing. cyanocobalamin 1,000 mcg tablet Take 1,000 mcg by mouth daily. cycloSPORINE (RESTASIS) 0.05 % dpet Administer 1 Drop to both eyes two (2) times a day. folic acid (FOLVITE) 1 mg tablet Take 2 mg by mouth daily. gemfibroziL (LOPID) 600 mg tablet Take 600 mg by mouth Before breakfast and dinner. hydrOXYchloroQUINE (PLAQUENIL) 200 mg tablet Take 200 mg by mouth two (2) times a day. cholecalciferol, vitamin D3, 50 mcg (2,000 unit) tab Take 2,000 Units by mouth daily. sodium chloride 1 gram tablet Take 1 g by mouth daily as needed. therapeutic multivitamin (THERAGRAN) tablet Take 1 Tab by mouth daily. pyridoxine, vitamin B6, (VITAMIN B-6) 100 mg tablet Take 100 mg by mouth daily. Allergies: Penicillins, Tramadol, and Adhesive tape-silicones   Social History     Socioeconomic History    Marital status:      Spouse name: Not on file    Number of children: Not on file    Years of education: Not on file    Highest education level: Not on file   Occupational History    Not on file   Tobacco Use    Smoking status: Former     Packs/day: 2.00     Years: 32.00     Pack years: 64.00     Types: Cigarettes     Quit date: 7/1/2012     Years since quitting: 10.2    Smokeless tobacco: Never    Tobacco comments:     Never used vapor or e-cigs    Substance and Sexual Activity    Alcohol use: No     Alcohol/week: 0.0 standard drinks     Comment: Sober 11 years    Drug use: No    Sexual activity: Not Currently     Birth control/protection: Surgical   Other Topics Concern    Not on file   Social History Narrative    Not on file     Social Determinants of Health     Financial Resource Strain: Not on file   Food Insecurity: Not on file   Transportation Needs: Not on file   Physical Activity: Not on file   Stress: Not on file   Social Connections: Not on file   Intimate Partner Violence: Not on file   Housing Stability: Not on file     Tobacco History:  reports that she quit smoking about 10 years ago. She has a 64.00 pack-year smoking history. She has never used smokeless tobacco.  Alcohol Abuse:  reports no history of alcohol use. Drug Abuse:  reports no history of drug use.     Patient Active Problem List   Diagnosis Code    Lupus ([facility] M32.9    Murmur, cardiac R01.1    Hypertriglyceridemia E78.1    Hyperlipemia E78.5    Tobacco abuse Z72.0    GERD (gastroesophageal reflux disease) K21.9    Asthma J45.909    Hx of seizure disorder Z86.69    Hypertension I10    ETOH abuse F10.10    DJD (degenerative joint disease) M19.90    DDD (degenerative disc disease), lumbar M51.36    Neuropathic pain M79.2    Hyponatremia E87.1    Acute posthemorrhagic anemia D62    Cervical stenosis of spine M48.02    Routine Papanicolaou smear Z12.4    Degenerative arthritis of metacarpophalangeal joint of left thumb M19.042    De Quervain's tenosynovitis, left M65.4    Trigger thumb, left thumb M65.312     Family History   Problem Relation Age of Onset    Hypertension Mother     Stroke Mother         TIA    OSTEOARTHRITIS Father         RA    No Known Problems Sister     No Known Problems Brother     No Known Problems Sister     No Known Problems Sister        Review of Systems - History obtained from the patient  Constitutional: negative for weight loss, fever, night sweats  HEENT: negative for hearing loss, earache, congestion, snoring, sorethroat  CV: negative for chest pain, palpitations, edema  Resp: negative for cough, shortness of breath, wheezing  GI: negative for change in bowel habits, abdominal pain, black or bloody stools  : negative for frequency, dysuria, hematuria, vaginal discharge  MSK: negative for  muscle pain  Breast: negative for breast lumps, nipple discharge, galactorrhea  Skin :negative for itching, rash, hives  Neuro: negative for dizziness, headache, confusion, weakness  Psych: negative for anxiety, depression, change in mood  Heme/lymph: negative for bleeding, bruising, pallor    Physical Exam    Visit Vitals  BP (!) 144/79   Pulse 71   Wt 190 lb (86.2 kg)   BMI 32.61 kg/m²       Constitutional  Appearance: well-nourished, well developed, alert, in no acute distress    HENT  Head and Face: appears normal    Neck  Inspection/Palpation: normal appearance, no masses or tenderness  Lymph Nodes: no lymphadenopathy present  Thyroid: gland size normal, nontender, no nodules or masses present on palpation    Chest  Respiratory Effort: breathing unlabored  Auscultation: normal breath sounds    Cardiovascular  Heart:   Auscultation: regular rate and rhythm without murmur    Breasts  Inspection of Breasts: breasts symmetrical, no skin changes, no discharge present, nipple appearance normal, no skin retraction present  Palpation of Breasts and Axillae: no masses present on palpation, no breast tenderness  Axillary Lymph Nodes: no lymphadenopathy present    Gastrointestinal  Abdominal Examination: abdomen non-tender to palpation, normal bowel sounds, no masses present  Liver and spleen: no hepatomegaly present, spleen not palpable  Hernias: no hernias identified    Genitourinary  External Genitalia:         Otherwise wnl    Vagina: normal vaginal vault without central or paravaginal defects, no discharge present, no inflammatory lesions present, no masses present  Bladder: non-tender to palpation  Urethra: appears normal  Cervix: absent   Uterus: absent  Adnexa: no adnexal tenderness present, no adnexal masses present  Perineum: perineum within normal limits, no evidence of trauma, no rashes or skin lesions present  Anus: anus within normal limits, no hemorrhoids present  Inguinal Lymph Nodes: no lymphadenopathy present    Skin  General Inspection: no rash, no lesions identified    Neurologic/Psychiatric  Mental Status:  Orientation: grossly oriented to person, place and time  Mood and Affect: mood normal, affect appropriate        Assessment & Plan:  Routine gynecologic examination. Pap/HPV neg 10/2020  H/o TERRI, no abnl paps. OK to exit routine screening. GSM -> vaginal estrace  ? LSA -> clobetasol  Has been on ERT, currently on Divigel 0.25mg. Will try to taper. Has meds at home, declines eRX today. Can call for RX if wants to continue  Counseled re: diet, exercise, healthy lifestyle  Return for yearly wellness visits  Rec annual mammogram.  Done today in our office  Patient verbalized understanding           Orders Placed This Encounter    estradioL (ESTRACE) 0.01 % (0.1 mg/gram) vaginal cream     Sig: Insert 0.5gm vaginally or apply to affected area twice a week. Dispense:  42.5 g     Refill:  3    clobetasoL (TEMOVATE) 0.05 % ointment     Sig: Apply to affected area daily for 1 month, then every other day for 1 month, then continue 2x/wk for maintenance.      Dispense:  45 g     Refill:  3

## 2022-10-10 ENCOUNTER — OFFICE VISIT (OUTPATIENT)
Dept: OBGYN CLINIC | Age: 62
End: 2022-10-10

## 2022-10-10 VITALS
SYSTOLIC BLOOD PRESSURE: 144 MMHG | WEIGHT: 190 LBS | DIASTOLIC BLOOD PRESSURE: 79 MMHG | BODY MASS INDEX: 32.61 KG/M2 | HEART RATE: 71 BPM

## 2022-10-10 DIAGNOSIS — Z01.419 ENCOUNTER FOR WELL WOMAN EXAM: Primary | ICD-10-CM

## 2022-10-10 DIAGNOSIS — R92.8 ABNORMAL SCREENING MAMMOGRAM: Primary | ICD-10-CM

## 2022-10-10 DIAGNOSIS — N90.5 VULVAR ATROPHY: ICD-10-CM

## 2022-10-10 PROCEDURE — 99396 PREV VISIT EST AGE 40-64: CPT | Performed by: OBSTETRICS & GYNECOLOGY

## 2022-10-10 RX ORDER — CLOBETASOL PROPIONATE 0.5 MG/G
OINTMENT TOPICAL
Qty: 45 G | Refills: 3 | Status: SHIPPED | OUTPATIENT
Start: 2022-10-10 | End: 2022-10-28 | Stop reason: SDUPTHER

## 2022-10-10 RX ORDER — ESTRADIOL 0.1 MG/G
CREAM VAGINAL
Qty: 42.5 G | Refills: 3 | Status: SHIPPED | OUTPATIENT
Start: 2022-10-10

## 2022-10-19 ENCOUNTER — HOSPITAL ENCOUNTER (OUTPATIENT)
Dept: MAMMOGRAPHY | Age: 62
Discharge: HOME | End: 2022-10-19
Attending: OBSTETRICS & GYNECOLOGY
Payer: COMMERCIAL

## 2022-10-19 DIAGNOSIS — R92.8 ABNORMAL SCREENING MAMMOGRAM: ICD-10-CM

## 2022-10-19 PROCEDURE — 76642 ULTRASOUND BREAST LIMITED: CPT

## 2022-10-19 PROCEDURE — 77061 BREAST TOMOSYNTHESIS UNI: CPT

## 2022-10-31 ENCOUNTER — HOSPITAL ENCOUNTER (OUTPATIENT)
Dept: MAMMOGRAPHY | Age: 62
Discharge: HOME | End: 2022-10-31
Attending: OBSTETRICS & GYNECOLOGY
Payer: COMMERCIAL

## 2022-10-31 DIAGNOSIS — R92.8 ABNORMAL SCREENING MAMMOGRAM: ICD-10-CM

## 2022-10-31 DIAGNOSIS — R92.8 ABNORMAL MAMMOGRAM: ICD-10-CM

## 2022-10-31 PROCEDURE — 74011000250 HC RX REV CODE- 250: Performed by: RADIOLOGY

## 2022-10-31 PROCEDURE — 19083 BX BREAST 1ST LESION US IMAG: CPT

## 2022-10-31 PROCEDURE — 88360 TUMOR IMMUNOHISTOCHEM/MANUAL: CPT

## 2022-10-31 PROCEDURE — 77065 DX MAMMO INCL CAD UNI: CPT

## 2022-10-31 PROCEDURE — 88305 TISSUE EXAM BY PATHOLOGIST: CPT

## 2022-10-31 RX ORDER — CLOBETASOL PROPIONATE 0.5 MG/G
OINTMENT TOPICAL
Qty: 45 G | Refills: 3 | Status: SHIPPED | OUTPATIENT
Start: 2022-10-31

## 2022-10-31 RX ORDER — LIDOCAINE HYDROCHLORIDE AND EPINEPHRINE 20; 10 MG/ML; UG/ML
20 INJECTION, SOLUTION INFILTRATION; PERINEURAL ONCE
Status: COMPLETED | OUTPATIENT
Start: 2022-10-31 | End: 2022-10-31

## 2022-10-31 RX ORDER — LIDOCAINE HYDROCHLORIDE 10 MG/ML
5 INJECTION INFILTRATION; PERINEURAL
Status: COMPLETED | OUTPATIENT
Start: 2022-10-31 | End: 2022-10-31

## 2022-10-31 RX ADMIN — LIDOCAINE HYDROCHLORIDE 5 ML: 10 INJECTION, SOLUTION INFILTRATION; PERINEURAL at 08:25

## 2022-10-31 RX ADMIN — LIDOCAINE HYDROCHLORIDE AND EPINEPHRINE 400 MG: 20; 10 INJECTION, SOLUTION INFILTRATION; PERINEURAL at 08:25

## 2022-10-31 NOTE — PROGRESS NOTES
Patient tolerated Left breast biopsy well with scant bleeding. Biopsy site was closed using Dermabond due to patient's adhesive allergy and discharge instructions were reviewed with the patient. She was provided with a written copy as well. Advised patient that results should be available in 2-3 business days and that she will receive a phone call.  Encouraged her to call with any questions or concerns.'

## 2022-10-31 NOTE — TELEPHONE ENCOUNTER
57year old patient last seen in the office on 10/10/2022      Prescription resent as per MD order to patient confirmed local pharmacy     Patient was sent a my chart message

## 2022-11-03 NOTE — PROGRESS NOTES
Dr. David Musa called patient with pathology. Appointment was made with Dr. Ale Lomas on  23/71 at 8584 for surgical consultation. Called patient with appointment information. She reports that the biopsy site is healing well. She does report that the Dermabond came off in the first day, but she replaced it with a bandaid and it seems to be healing well. She also reports that the area is bruised. Encouraged her to call with any questions.

## 2022-11-08 ENCOUNTER — NURSE NAVIGATOR (OUTPATIENT)
Dept: CASE MANAGEMENT | Age: 62
End: 2022-11-08

## 2022-11-08 DIAGNOSIS — C50.912 MALIGNANT NEOPLASM OF LEFT BREAST IN FEMALE, ESTROGEN RECEPTOR POSITIVE, UNSPECIFIED SITE OF BREAST (HCC): Primary | ICD-10-CM

## 2022-11-08 DIAGNOSIS — Z17.0 MALIGNANT NEOPLASM OF LEFT BREAST IN FEMALE, ESTROGEN RECEPTOR POSITIVE, UNSPECIFIED SITE OF BREAST (HCC): Primary | ICD-10-CM

## 2022-11-08 NOTE — PROGRESS NOTES
Order placed for BILATERAL breast MRI with and without contrast per verbal order of Dr. Ilia Nolasco.

## 2022-11-09 DIAGNOSIS — C50.919 MALIGNANT NEOPLASM OF FEMALE BREAST, UNSPECIFIED ESTROGEN RECEPTOR STATUS, UNSPECIFIED LATERALITY, UNSPECIFIED SITE OF BREAST (HCC): Primary | ICD-10-CM

## 2022-11-09 RX ORDER — ALPRAZOLAM 0.5 MG/1
0.5 TABLET ORAL
Qty: 12 TABLET | Refills: 0 | Status: SHIPPED | OUTPATIENT
Start: 2022-11-09

## 2022-11-09 NOTE — PROGRESS NOTES
[facility]   Breast Navigator Encounter    Name:  Miriam Yang      Age:  57 y.o. Diagnosis:    LEFT breast cancer      Interdisciplinary Team:  Med-Onc:                          Surg-Onc:          Dr. Bernard Postin:         Plastics:           :     Nurse Navigator:  Alvaro Bravo RN, BSN, [facility]    Encounter type:  []Patient Initiated  []Navigator Follow-up []Pre-op  []Post-op  []Check-in Prior to First Treatment []Treatment Modality Change  [x]Initial Navigator Encounter []Other:       Narrative:   Reached out to patient for initial navigator contact. I explained what happens at the first consultation - an exam by the physician, a possible ultrasound, then complete discussion of surgical options and other treatment that may be considered. I encouraged the patient to bring  someone with her to this appointment. She will bring her spouse. Discussed breast MRI as the next step in her work-up, and she would like to go ahead and get this scheduled prior to her appointment. With the help of the imaging navigator, I was able to get this scheduled for 11/10/2022 at [facility] at 8:15 am.  I explained the procedure to her. She has had MRIs before, but is quite claustrophobic, would benefit from a small dose of Xanax. She will have a ride to the imaging center. I confirmed her pharmacy and told her I would have Dr. Fili Ambrose e-scribe a few Xanax 0.5 mg to her [employer] on [address]. Discussed size of her cancer, early stage 1, strongly ER+. Briefly discussed lumpectomy. She has a history of uterine cancer and early colon cancer. There is a FH of lung cancer in a maternal grandmother. She did not have any other questions for me today. Provided the patient with my contact information and discussed my role in her care. I will continue to follow the patient.             Alvaro Bravo RN, BSN, [address]  W: [phone]  F: [phone]  [email or web address]  Good Help to Those in [facility]

## 2022-11-10 ENCOUNTER — HOSPITAL ENCOUNTER (OUTPATIENT)
Dept: MRI IMAGING | Age: 62
Discharge: HOME | End: 2022-11-10
Attending: SURGERY
Payer: COMMERCIAL

## 2022-11-10 DIAGNOSIS — Z17.0 MALIGNANT NEOPLASM OF LEFT BREAST IN FEMALE, ESTROGEN RECEPTOR POSITIVE, UNSPECIFIED SITE OF BREAST (HCC): ICD-10-CM

## 2022-11-10 DIAGNOSIS — C50.912 MALIGNANT NEOPLASM OF LEFT BREAST IN FEMALE, ESTROGEN RECEPTOR POSITIVE, UNSPECIFIED SITE OF BREAST (HCC): ICD-10-CM

## 2022-11-10 LAB — CREAT BLD-MCNC: 0.8 MG/DL (ref 0.6–1.3)

## 2022-11-10 PROCEDURE — A9585 GADOBUTROL INJECTION: HCPCS | Performed by: SURGERY

## 2022-11-10 PROCEDURE — 74011250636 HC RX REV CODE- 250/636: Performed by: SURGERY

## 2022-11-10 PROCEDURE — 82565 ASSAY OF CREATININE: CPT

## 2022-11-10 PROCEDURE — 77049 MRI BREAST C-+ W/CAD BI: CPT

## 2022-11-10 RX ADMIN — GADOBUTROL 9 ML: 604.72 INJECTION INTRAVENOUS at 09:02

## 2022-11-16 ENCOUNTER — NURSE NAVIGATOR (OUTPATIENT)
Dept: CASE MANAGEMENT | Age: 62
End: 2022-11-16

## 2022-11-16 ENCOUNTER — OFFICE VISIT (OUTPATIENT)
Dept: SURGERY | Age: 62
End: 2022-11-16
Payer: COMMERCIAL

## 2022-11-16 VITALS — BODY MASS INDEX: 31.16 KG/M2 | WEIGHT: 187 LBS | HEIGHT: 65 IN

## 2022-11-16 DIAGNOSIS — C50.912 INFILTRATING DUCTAL CARCINOMA OF LEFT BREAST (HCC): Primary | ICD-10-CM

## 2022-11-16 PROCEDURE — 99205 OFFICE O/P NEW HI 60 MIN: CPT | Performed by: SURGERY

## 2022-11-16 NOTE — PROGRESS NOTES
[address] [facility]  Breast Navigator Encounter    Name:  Rin Govea      Age:  57 y.o. Diagnosis:    LEFT breast cancer      Interdisciplinary Team:  Med-Onc:                          Surg-Onc:             Dr. Nedra Rodriguez:         Plastics:           :     Nurse Navigator:  Charmaine Cash RN, BSN, [facility]    Encounter type:  []Patient Initiated  [x]Navigator Follow-up []Pre-op  []Post-op  []Check-in Prior to First Treatment []Treatment Modality Change  []Initial Navigator Encounter []Other:       Narrative:    Met patient and her spouse, Deleta Neighbours, at her breast talk appointment. They felt like all of their questions were answered and felt much better after the visit. She has decided on BILATERAL mastectomies without reconstruction. She has had several cancers already and feels this is the decision that is appropriate for her. Discussed the surgery briefly, went over WILFREDO drains and care. I provided her with a post-op camisole and drain pouches. She has my contact information. I encouraged her to reach out to me with any further questions/concerns. Referrals/Handouts:   Provided patient with 2XL camisole and three drain pouches.           Charmaine Cash RN, BSN, [facility]  Oncology Breast Navigator     [address]  [address]  W: [phone]  F: [phone]  [email or web address]  Good Help to Those in [facility]

## 2022-11-16 NOTE — PROGRESS NOTES
HISTORY OF PRESENT ILLNESS  Panda Barnett is a 57 y.o. female. HPI NEW patient consult referred by  Dr. Yoav Tabor for LEFT breast IDC. Found on imaging. Denies pain or changes to the breast area. 10/31/2022: LEFT breast mass, ultrasound-guided biopsy. PATH: IDC with tubular features, 0.4cm in greatest dimension in a single core, grade 1, ER+(100%), ID+(100%), HER2-, Ki-67(5%). Family History:  None    Breast imaging-   MRI Results (most recent):  Results from Hospital Encounter encounter on 11/10/22    MRI BREAST BI W WO CONT    Narrative  INDICATION: Left breast carcinoma    COMPARISON: Multiple prior breast imaging studies from October 2022. TECHNIQUE:  Multisequence, multiplanar, bilateral breast MRI was performed in prone position  using a dedicated breast coil. Images were obtained without contrast and dynamic  postcontrast images were obtained in multiple phases. 9 mL IV Gadavist was  administered. Subtraction images were reconstructed. Postcontrast images were  reviewed with dedicated kinetic analysis software. FINDINGS:  There is mild background parenchymal enhancement and heterogeneous  fibroglandular tissue. The spiculated mass at 10:00 in the middle to posterior  third of the left breast measures 9 x 12 x 12 mm. A biopsy clip lies immediately  anterior to it. Multiple, subcentimeter, satellite masses in a segmental/ray  distribution extend over 62 mm anteroinferiorly in the upper inner quadrant. The  anteroinferior most mass is at the level of the nipple line. No axillary or  internal mammary chain lymphadenopathy. No suspicious contralateral enhancement. No right lymphadenopathy. A summary portfolio with key images has been sent from kinetic analysis software  to PACS. Impression  1. Left upper inner quadrant breast mass (9 x 12 mm). BI-RADS 6.  2. Multiple satellite masses, and a segmental distribution, extending from  anterior to posterior third, over 62 mm.  BI-RADS 4.  3. No lymphadenopathy. 4. No suspicious contralateral enhancement. 5. Overall assessment: BI-RADS Assessment Category 4: Suspicious abnormality. 6. Recommendation: MRI guided left breast biopsy could be performed if it would  .           ROS    Physical Exam    ASSESSMENT and PLAN  {ASSESSMENT/PLAN:[id number]}

## 2022-11-16 NOTE — PROGRESS NOTES
HISTORY OF PRESENT ILLNESS  Luis Oden is a 57 y.o. female. HPI  NEW patient consult referred by  Dr. Flo Carr for LEFT breast IDC. Found on imaging. Denies pain or changes to the breast area. Family History:  None           Breast imaging-   MRI Results (most recent):  Results from Hospital Encounter encounter on 11/10/22     MRI BREAST BI W WO CONT     Narrative  INDICATION: Left breast carcinoma     COMPARISON: Multiple prior breast imaging studies from October 2022. TECHNIQUE:  Multisequence, multiplanar, bilateral breast MRI was performed in prone position  using a dedicated breast coil. Images were obtained without contrast and dynamic  postcontrast images were obtained in multiple phases. 9 mL IV Gadavist was  administered. Subtraction images were reconstructed. Postcontrast images were  reviewed with dedicated kinetic analysis software. FINDINGS:  There is mild background parenchymal enhancement and heterogeneous  fibroglandular tissue. The spiculated mass at 10:00 in the middle to posterior  third of the left breast measures 9 x 12 x 12 mm. A biopsy clip lies immediately  anterior to it. Multiple, subcentimeter, satellite masses in a segmental/ray  distribution extend over 62 mm anteroinferiorly in the upper inner quadrant. The  anteroinferior most mass is at the level of the nipple line. No axillary or  internal mammary chain lymphadenopathy. No suspicious contralateral enhancement. No right lymphadenopathy. A summary portfolio with key images has been sent from kinetic analysis software  to PACS. Impression  1. Left upper inner quadrant breast mass (9 x 12 mm). BI-RADS 6.  2. Multiple satellite masses, and a segmental distribution, extending from  anterior to posterior third, over 62 mm. BI-RADS 4.  3. No lymphadenopathy. 4. No suspicious contralateral enhancement. 5. Overall assessment: BI-RADS Assessment Category 4: Suspicious abnormality.   6. Recommendation: MRI guided left breast biopsy could be performed if it would  . 10/31/2022: LEFT breast mass, ultrasound-guided biopsy. PATH: IDC with tubular features, 0.4cm in greatest dimension in a single core, grade 1, ER+(100%), OK+(100%), HER2-, Ki-67(5%). Past Medical History:   Diagnosis Date    Abnormal screening mammogram 10/10/2022    focal asymmetry in the left breast, slightly medial to the nipple line in the deep 3rd    Asthma     activity induced asthma     Autoimmune disease ([address]     mixed connective tissue disease    Cancer ([address]     skin-basal    Chronic pain     lower back, joints    DDD (degenerative disc disease), lumbar     Dense breast tissue on mammogram 10/02/2020    BI-RADS 1: Negative.     DJD (degenerative joint disease)     ETOH abuse     Sober 12-13-11    GERD (gastroesophageal reflux disease)     H/O diagnostic mammography 10/19/2022    BI-RADS Assessment Category 5: Highly suggestive of malignancy- Appropriate NEEDS BIOPSY LEFT BREAST    Hernia     Hx of bronchitis     Hx of mammogram 10/04/2021    Negative     Hx of seizure disorder 2010    unknown cause, none since    Hx of sinusitis     Hyperlipemia     Hypertension     Hypertriglyceridemia     Hyponatremia     Insomnia     Lupus ([address]     Murmur, cardiac     Neuropathic pain     Osteopenia     DEXA (2019), started on Fosamax per rheumatology    Other bursitis of hip, right hip 09/14/2016    Had a deep hip injection for pain     Psychiatric disorder     anxiety    Routine Papanicolaou smear 09/20/2016    Negative (no hpv)     Seizures ([address] 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     Stroke ([address] 03/2011    PCP states she had a TIA - patient denies this (was low Na), pt denies this    Tobacco abuse     Stopped in 2012       Past Surgical History:   Procedure Laterality Date    HX CYST INCISION AND DRAINAGE Left 08/2016    HX HERNIA REPAIR  10/7/2014    incisional with mesh    HX LAP CHOLECYSTECTOMY  3/11/2014    HX LUMBAR FUSION      10/8/2012    HX LUMBAR LAMINECTOMY      rods in 1996, out 1998    HX LUMBAR LAMINECTOMY  02/2017    HX [address]    back surgery    HX ORTHOPAEDIC Bilateral 2013 R, 2014 L    carpal tunnel    HX ORTHOPAEDIC  6/27/2013    right knee arthroscopy    HX ORTHOPAEDIC Right 3/2015    trigger thumb release    HX ORTHOPAEDIC  10/8/2012    ALIF/ PLIF with bone stimulator    HX ORTHOPAEDIC  10/23/2012    Back surgery to adjust hardware    HX ORTHOPAEDIC  01/2018    ALIF - fusion L2-L3    HX ORTHOPAEDIC  04/01/2019    Back surgery    HX ORTHOPAEDIC Left 06/2021    toe sx    HX OTHER SURGICAL  10/08/2012    [facility] Bone Growth Stimulator    HX TONSIL AND ADENOIDECTOMY  1962    HX TOTAL ABDOMINAL HYSTERECTOMY  12/20/1992    fibroids; Dr. Cranston Boast History     Socioeconomic History    Marital status:      Spouse name: Not on file    Number of children: Not on file    Years of education: Not on file    Highest education level: Not on file   Occupational History    Not on file   Tobacco Use    Smoking status: Former     Packs/day: 2.00     Years: 32.00     Pack years: 64.00     Types: Cigarettes     Quit date: 7/1/2012     Years since quitting: 10.3    Smokeless tobacco: Never    Tobacco comments:     Never used vapor or e-cigs    Substance and Sexual Activity    Alcohol use: No     Alcohol/week: 0.0 standard drinks     Comment: Sober 11 years    Drug use: No    Sexual activity: Not Currently     Birth control/protection: Surgical   Other Topics Concern    Not on file   Social History Narrative    Not on file     Social Determinants of Health     Financial Resource Strain: Not on file   Food Insecurity: Not on file   Transportation Needs: Not on file   Physical Activity: Not on file   Stress: Not on file   Social Connections: Not on file   Intimate Partner Violence: Not on file   Housing Stability: Not on file       Current Outpatient Medications on File Prior to Visit   Medication Sig Dispense Refill    ALPRAZolam (XANAX) 0.5 mg tablet Take 1 Tablet by mouth three (3) times daily as needed for Anxiety (take as directed). Max Daily Amount: 1.5 mg. Indications: anxious 12 Tablet 0    clobetasoL (TEMOVATE) 0.05 % ointment Apply to affected area daily for 1 month, then every other day for 1 month, then continue 2x/wk for maintenance. 45 g 3    estradioL (ESTRACE) 0.01 % (0.1 mg/gram) vaginal cream Insert 0.5gm vaginally or apply to affected area twice a week. 42.5 g 3    sertraline 200 mg cap Take 200 mg by mouth every evening. sucralfate (CARAFATE) 1 gram tablet Take 1 g by mouth daily as needed. valsartan (DIOVAN) 320 mg tablet Take 320 mg by mouth Every morning. famotidine (PEPCID) 40 mg tablet Take 40 mg by mouth every evening. copper gluconate 2 mg capsule Take 2 mg by mouth daily. dexlansoprazole (DEXILANT) 60 mg CpDB capsule (delayed release) Take 1 Capsule by mouth Every morning. furosemide (LASIX) 20 mg tablet Take 20 mg by mouth daily as needed. pregabalin (LYRICA) 225 mg capsule Take 225 mg by mouth two (2) times a day. albuterol (PROVENTIL HFA, VENTOLIN HFA, PROAIR HFA) 90 mcg/actuation inhaler Take 2 Puffs by inhalation every six (6) hours as needed for Wheezing. cyanocobalamin 1,000 mcg tablet Take 1,000 mcg by mouth daily. cycloSPORINE (RESTASIS) 0.05 % dpet Administer 1 Drop to both eyes two (2) times a day. folic acid (FOLVITE) 1 mg tablet Take 2 mg by mouth daily. gemfibroziL (LOPID) 600 mg tablet Take 600 mg by mouth Before breakfast and dinner. hydrOXYchloroQUINE (PLAQUENIL) 200 mg tablet Take 200 mg by mouth two (2) times a day. cholecalciferol, vitamin D3, 50 mcg (2,000 unit) tab Take 2,000 Units by mouth daily. sodium chloride 1 gram tablet Take 1 g by mouth daily as needed. therapeutic multivitamin (THERAGRAN) tablet Take 1 Tab by mouth daily.       pyridoxine, vitamin B6, (VITAMIN B-6) 100 mg tablet Take 100 mg by mouth daily. No current facility-administered medications on file prior to visit. Allergies   Allergen Reactions    Penicillins Rash     Fever. 14 Reports able to take Keflex without problem. Tolerated ancef in     Tramadol Shortness of Breath    Adhesive Tape-Silicones Rash       OB History          0    Para   0    Term   0            AB        Living             SAB        IAB        Ectopic        Molar        Multiple        Live Births   0          Obstetric Comments   Menarche:  4. LMP: 32.  # of Children:  0. Age at Delivery of First Child:  n/a. Hysterectomy/oophorectomy:  YES/NO. Breast Bx:  Yes left . Hx of Breast Feeding:  n/a. BCP:  no. Hormone therapy:  no.                ROS      Physical Exam  Exam conducted with a chaperone present. Constitutional:       Appearance: She is well-developed. She is not diaphoretic. HENT:      Head: Normocephalic and atraumatic. Right Ear: External ear normal.      Left Ear: External ear normal.   Eyes:      General: No scleral icterus. Right eye: No discharge. Left eye: No discharge. Pupils: Pupils are equal, round, and reactive to light. Neck:      Thyroid: No thyromegaly. Vascular: No JVD. Trachea: No tracheal deviation. Cardiovascular:      Rate and Rhythm: Normal rate and regular rhythm. Heart sounds: Normal heart sounds. Pulmonary:      Effort: Pulmonary effort is normal. No tachypnea, accessory muscle usage or respiratory distress. Breath sounds: Normal breath sounds. No stridor. Chest:   Breasts:     Breasts are symmetrical.      Right: No inverted nipple, mass, nipple discharge, skin change or tenderness. Left: No inverted nipple, mass, nipple discharge, skin change or tenderness. Abdominal:      General: There is no distension. Palpations: Abdomen is soft. There is no mass. Tenderness: There is no abdominal tenderness. Musculoskeletal:         General: Normal range of motion. Cervical back: Normal range of motion and neck supple. Lymphadenopathy:      Cervical: No cervical adenopathy. Skin:     General: Skin is warm and dry. Neurological:      Mental Status: She is alert and oriented to person, place, and time. Psychiatric:         Speech: Speech normal.         Behavior: Behavior normal.         Thought Content: Thought content normal.         Judgment: Judgment normal.         BREAST ULTRASOUND, Pre-op Planning  Indication: Pre-op planning, LEFT breast, 9:00. Technique: The area was scanned using a high-frequency linear-array near-field transducer  Findings: Irregular hypoechoic mass with clip and really dense breast tissue. Impression: Breast cancer  Disposition: Surgery        ASSESSMENT and PLAN    ICD-10-CM ICD-9-CM    1. Infiltrating ductal carcinoma of left breast (HCC)  C50.912 174.9         New pt presents for consultation for treatment of LEFT breast IDC, ER+(100%)/IN+(100%)/HER2-, Ki-67 (5%). Upon physical examination noted nothing palpable. Ultrasound visualizes irregular hypoechoic mass with clip at 9:00 of LEFT breast and really dense breast tissue. We had a long discussion of options for treatment. The patient was accompanied by her friend during this encounter, and over half the time was spent on counseling and coordination of care. We discussed in depth the pathology results and surgical planning. The goals of treatment are to treat the breast, and to reduce risk of local or distant recurrence. Discussed treatment options with risks, complications, benefits, and limitations, including lumpectomy with XRT, and mastectomy with optional reconstruction, both having the same cure rate. Explained the importance of negative margins, and the need for re-excision in the case of positive margins. Will plan to mobilize breast tissue during lumpectomy to minimize cosmetic defect.   Also discussed benefit and technique of SLNBx of 3-4 LNs. We also covered risk reduction strategies including XRT, chemotherapy, and hormonal therapy with estrogen blocker. We also discussed the pts questions and concerns such as comparing risk reduction with lumpectomy vs mastectomy. Question regarding recovery. Pt has elected to proceed with BILATERAL mastectomy with no reconstruction. Pt should feel free to call Verito Kim or Jonathan Barrow, nurse navigators, with any further questions. This plan was reviewed with the patient and patient agrees. All questions were answered. Total time spent was 80 minutes.       Written by Alin Reed, as dictated by Dr. Catherine Brock MD.

## 2022-11-18 DIAGNOSIS — C50.912 MALIGNANT NEOPLASM OF LEFT FEMALE BREAST, UNSPECIFIED ESTROGEN RECEPTOR STATUS, UNSPECIFIED SITE OF BREAST (HCC): Primary | ICD-10-CM

## 2022-11-22 ENCOUNTER — HOSPITAL ENCOUNTER (OUTPATIENT)
Dept: PREADMISSION TESTING | Age: 62
Discharge: HOME | End: 2022-11-22
Payer: COMMERCIAL

## 2022-11-22 VITALS
OXYGEN SATURATION: 98 % | TEMPERATURE: 97.1 F | HEART RATE: 64 BPM | HEIGHT: 65 IN | RESPIRATION RATE: 20 BRPM | DIASTOLIC BLOOD PRESSURE: 80 MMHG | WEIGHT: 190.7 LBS | BODY MASS INDEX: 31.77 KG/M2 | SYSTOLIC BLOOD PRESSURE: 173 MMHG

## 2022-11-22 LAB
ABO + RH BLD: NORMAL
ANION GAP SERPL CALC-SCNC: 4 MMOL/L (ref 5–15)
BASOPHILS # BLD: 0.1 K/UL (ref 0–0.1)
BASOPHILS NFR BLD: 1 % (ref 0–1)
BLOOD GROUP ANTIBODIES SERPL: NORMAL
BUN SERPL-MCNC: 20 MG/DL (ref 6–20)
BUN/CREAT SERPL: 20 (ref 12–20)
CALCIUM SERPL-MCNC: 8.7 MG/DL (ref 8.5–10.1)
CHLORIDE SERPL-SCNC: 106 MMOL/L (ref 97–108)
CO2 SERPL-SCNC: 27 MMOL/L (ref 21–32)
CREAT SERPL-MCNC: 1 MG/DL (ref 0.55–1.02)
DIFFERENTIAL METHOD BLD: NORMAL
EOSINOPHIL # BLD: 0.2 K/UL (ref 0–0.4)
EOSINOPHIL NFR BLD: 3 % (ref 0–7)
ERYTHROCYTE [DISTWIDTH] IN BLOOD BY AUTOMATED COUNT: 13.5 % (ref 11.5–14.5)
GLUCOSE SERPL-MCNC: 91 MG/DL (ref 65–100)
HCT VFR BLD AUTO: 38.9 % (ref 35–47)
HGB BLD-MCNC: 13 G/DL (ref 11.5–16)
IMM GRANULOCYTES # BLD AUTO: 0 K/UL (ref 0–0.04)
IMM GRANULOCYTES NFR BLD AUTO: 0 % (ref 0–0.5)
LYMPHOCYTES # BLD: 2.3 K/UL (ref 0.8–3.5)
LYMPHOCYTES NFR BLD: 40 % (ref 12–49)
MCH RBC QN AUTO: 31.9 PG (ref 26–34)
MCHC RBC AUTO-ENTMCNC: 33.4 G/DL (ref 30–36.5)
MCV RBC AUTO: 95.6 FL (ref 80–99)
MONOCYTES # BLD: 0.6 K/UL (ref 0–1)
MONOCYTES NFR BLD: 10 % (ref 5–13)
NEUTS SEG # BLD: 2.6 K/UL (ref 1.8–8)
NEUTS SEG NFR BLD: 46 % (ref 32–75)
NRBC # BLD: 0 K/UL (ref 0–0.01)
NRBC BLD-RTO: 0 PER 100 WBC
PLATELET # BLD AUTO: 258 K/UL (ref 150–400)
PMV BLD AUTO: 10.2 FL (ref 8.9–12.9)
POTASSIUM SERPL-SCNC: 4.6 MMOL/L (ref 3.5–5.1)
RBC # BLD AUTO: 4.07 M/UL (ref 3.8–5.2)
SODIUM SERPL-SCNC: 137 MMOL/L (ref 136–145)
SPECIMEN EXP DATE BLD: NORMAL
WBC # BLD AUTO: 5.7 K/UL (ref 3.6–11)

## 2022-11-22 PROCEDURE — 85025 COMPLETE CBC W/AUTO DIFF WBC: CPT

## 2022-11-22 PROCEDURE — 93005 ELECTROCARDIOGRAM TRACING: CPT

## 2022-11-22 PROCEDURE — 80048 BASIC METABOLIC PNL TOTAL CA: CPT

## 2022-11-22 PROCEDURE — 86900 BLOOD TYPING SEROLOGIC ABO: CPT

## 2022-11-22 PROCEDURE — 36415 COLL VENOUS BLD VENIPUNCTURE: CPT

## 2022-11-22 RX ORDER — BISMUTH SUBSALICYLATE 262 MG
1 TABLET,CHEWABLE ORAL DAILY
COMMUNITY

## 2022-11-22 RX ORDER — ACETAMINOPHEN AND CODEINE PHOSPHATE 300; 30 MG/1; MG/1
1 TABLET ORAL
COMMUNITY

## 2022-11-22 RX ORDER — CALCIUM CARBONATE 600 MG
600 TABLET ORAL 2 TIMES DAILY
COMMUNITY

## 2022-11-22 RX ORDER — METHOTREXATE 2.5 MG/1
10 TABLET ORAL
COMMUNITY

## 2022-11-22 RX ORDER — ADALIMUMAB 40MG/0.4ML
40 KIT SUBCUTANEOUS 2 TIMES WEEKLY
COMMUNITY

## 2022-11-22 RX ORDER — FLUTICASONE PROPIONATE AND SALMETEROL 250; 50 UG/1; UG/1
1 POWDER RESPIRATORY (INHALATION) 2 TIMES DAILY
COMMUNITY

## 2022-11-22 RX ORDER — LANOLIN ALCOHOL/MO/W.PET/CERES
325 CREAM (GRAM) TOPICAL
COMMUNITY

## 2022-11-22 NOTE — PERIOP NOTES
[address]                   [address], [address] [facility]   MAIN OR                                  [phone]   MAIN PRE OP                          [phone]                                                                                AMBULATORY PRE OP          [phone]  PRE-ADMISSION TESTING    [phone]   Surgery Date:  Tuesday, 11/29/2022       Is surgery arrival time given by surgeon? YES   If NO, [facility] staff will call you between 3 and 7pm the day before your surgery with your arrival time. (If your surgery is on a Monday, we will call you the Friday before.)    Call [phone] after 7pm Monday-Friday if you did not receive this call. INSTRUCTIONS BEFORE YOUR SURGERY   When You  Arrive Arrive at the 2nd [address] [facility] on the day of your surgery  Have your insurance card, photo ID, and any copayment (if needed)   Food   and   Drink NO food or drink after midnight the night before surgery    This means NO water, gum, mints, coffee, juice, etc.  No alcohol (beer, wine, liquor) 24 hours before and after surgery   Medications to   TAKE   Morning of Surgery MEDICATIONS TO TAKE THE MORNING OF SURGERY WITH A SIP OF WATER:   Lyrica, Dexilant, Advir, and Restasis  OK to take your Tylenol #3 if needed  OK to use your Proventil inhaler if needed, and bring this rescue inhaler with you on the day of surgery. Medications  To  STOP      7 days before surgery Non-Steroidal anti-inflammatory Drugs (NSAID's): for example, Ibuprofen (Advil, Motrin), Naproxen (Aleve)  Aspirin, if taking for pain   Herbal supplements, vitamins, and fish oil  Other:  (Pain medications not listed above, including Tylenol may be taken)   Blood  Thinners If you take  Aspirin, Plavix, Coumadin, or any blood-thinning or anti-blood clot medicine, talk to the doctor who prescribed the medications for pre-operative instructions.    Bathing Clothing  Jewelry  Valuables     If you shower the morning of surgery, please do not apply anything to your skin (lotions, powders, deodorant, or makeup, especially mascara)  Follow Chlorhexidine Care Fusion body wash instructions provided to you during PAT appointment. Begin 3 days prior to surgery. Do not shave or trim anywhere 24 hours before surgery  Wear your hair loose or down; no pony-tails, buns, or metal hair clips  Wear loose, comfortable, clean clothes  Wear glasses instead of contacts  Leave money, valuables, and jewelry, including body piercings, at home  If you were given an [employer], bring it on day of surgery. Going Home - or Spending the Night SAME-DAY SURGERY: You must have a responsible adult drive you home and stay with you 24 hours after surgery  ADMITS: If your doctor is keeping you in the hospital after surgery, leave personal belongings/luggage in your car until you have a hospital room number. Hospital discharge time is 12 noon  Drivers must be here before 12 noon unless you are told differently   Special Instructions Ask your prescribing physician when to hold/ resume your Humira, Plaquenil, and Methotrexate in relation to your surgery. Free  parking from 7am-5pm.    Bring your C-PAP with you on the day of surgery, but leave it in the trunk of your car. Follow all instructions so your surgery wont be cancelled. Please, be on time. If a situation occurs and you are delayed the day of surgery, call  [phone]. If your physical condition changes (like a fever, cold, flu, etc.) call your surgeon. Home medication(s) reviewed and verified via  LIST   VERBAL   during PAT appointment. The patient was contacted by    IN-PERSON  The patient verbalizes understanding of all instructions and   DOES NOT   need reinforcement.

## 2022-11-23 LAB
ATRIAL RATE: 61 BPM
CALCULATED P AXIS, ECG09: 63 DEGREES
CALCULATED R AXIS, ECG10: 15 DEGREES
CALCULATED T AXIS, ECG11: 38 DEGREES
DIAGNOSIS, 93000: NORMAL
P-R INTERVAL, ECG05: 154 MS
Q-T INTERVAL, ECG07: 420 MS
QRS DURATION, ECG06: 82 MS
QTC CALCULATION (BEZET), ECG08: 422 MS
VENTRICULAR RATE, ECG03: 61 BPM

## 2022-11-28 ENCOUNTER — ANESTHESIA EVENT (OUTPATIENT)
Dept: SURGERY | Age: 62
End: 2022-11-28
Payer: COMMERCIAL

## 2022-11-29 ENCOUNTER — HOSPITAL ENCOUNTER (OUTPATIENT)
Age: 62
Setting detail: OBSERVATION
Discharge: HOME | End: 2022-11-30
Attending: SURGERY | Admitting: SURGERY
Payer: COMMERCIAL

## 2022-11-29 ENCOUNTER — APPOINTMENT (OUTPATIENT)
Dept: NUCLEAR MEDICINE | Age: 62
End: 2022-11-29
Attending: SURGERY
Payer: COMMERCIAL

## 2022-11-29 ENCOUNTER — ANESTHESIA (OUTPATIENT)
Dept: SURGERY | Age: 62
End: 2022-11-29
Payer: COMMERCIAL

## 2022-11-29 DIAGNOSIS — C50.912 MALIGNANT NEOPLASM OF LEFT FEMALE BREAST, UNSPECIFIED ESTROGEN RECEPTOR STATUS, UNSPECIFIED SITE OF BREAST (HCC): ICD-10-CM

## 2022-11-29 DIAGNOSIS — C50.912 INFILTRATING DUCTAL CARCINOMA OF LEFT BREAST (HCC): Primary | ICD-10-CM

## 2022-11-29 PROBLEM — C50.919 BREAST CANCER (HCC): Status: ACTIVE | Noted: 2022-11-29

## 2022-11-29 PROCEDURE — 77030011267 HC ELECTRD BLD COVD -A: Performed by: SURGERY

## 2022-11-29 PROCEDURE — 77030041680 HC PNCL ELECSURG SMK EVAC CNMD -B: Performed by: SURGERY

## 2022-11-29 PROCEDURE — 77030040922 HC BLNKT HYPOTHRM STRY -A

## 2022-11-29 PROCEDURE — 74011250636 HC RX REV CODE- 250/636: Performed by: SURGERY

## 2022-11-29 PROCEDURE — 88307 TISSUE EXAM BY PATHOLOGIST: CPT

## 2022-11-29 PROCEDURE — 77030002933 HC SUT MCRYL J&J -A: Performed by: SURGERY

## 2022-11-29 PROCEDURE — 2709999900 HC NON-CHARGEABLE SUPPLY: Performed by: SURGERY

## 2022-11-29 PROCEDURE — 78195 LYMPH SYSTEM IMAGING: CPT

## 2022-11-29 PROCEDURE — 76210000037 HC AMBSU PH I REC 2 TO 2.5 HR: Performed by: SURGERY

## 2022-11-29 PROCEDURE — 74011250636 HC RX REV CODE- 250/636: Performed by: ANESTHESIOLOGY

## 2022-11-29 PROCEDURE — 74011250636 HC RX REV CODE- 250/636: Performed by: NURSE ANESTHETIST, CERTIFIED REGISTERED

## 2022-11-29 PROCEDURE — 77030010507 HC ADH SKN DERMBND J&J -B: Performed by: SURGERY

## 2022-11-29 PROCEDURE — 74011000258 HC RX REV CODE- 258: Performed by: SURGERY

## 2022-11-29 PROCEDURE — 74011250637 HC RX REV CODE- 250/637: Performed by: SURGERY

## 2022-11-29 PROCEDURE — G0378 HOSPITAL OBSERVATION PER HR: HCPCS

## 2022-11-29 PROCEDURE — 77030040361 HC SLV COMPR DVT MDII -B

## 2022-11-29 PROCEDURE — 76060000065 HC AMB SURG ANES 2.5 TO 3 HR: Performed by: SURGERY

## 2022-11-29 PROCEDURE — 76030000005 HC AMB SURG OR TIME 2.5 TO 3: Performed by: SURGERY

## 2022-11-29 PROCEDURE — C9290 INJ, BUPIVACAINE LIPOSOME: HCPCS | Performed by: SURGERY

## 2022-11-29 PROCEDURE — 74011000250 HC RX REV CODE- 250: Performed by: NURSE ANESTHETIST, CERTIFIED REGISTERED

## 2022-11-29 PROCEDURE — 77030031139 HC SUT VCRL2 J&J -A: Performed by: SURGERY

## 2022-11-29 PROCEDURE — 77030034626 HC LIGASURE SM JAW SEAL OPN SURG COVD -E: Performed by: SURGERY

## 2022-11-29 PROCEDURE — 77030008462 HC STPLR SKN PROX J&J -A: Performed by: SURGERY

## 2022-11-29 PROCEDURE — 74011000250 HC RX REV CODE- 250: Performed by: SURGERY

## 2022-11-29 PROCEDURE — 77030040506 HC DRN WND MDII -A: Performed by: SURGERY

## 2022-11-29 RX ORDER — GLYCOPYRROLATE 0.2 MG/ML
INJECTION INTRAMUSCULAR; INTRAVENOUS AS NEEDED
Status: DISCONTINUED | OUTPATIENT
Start: 2022-11-29 | End: 2022-11-29 | Stop reason: HOSPADM

## 2022-11-29 RX ORDER — ROCURONIUM BROMIDE 10 MG/ML
INJECTION, SOLUTION INTRAVENOUS AS NEEDED
Status: DISCONTINUED | OUTPATIENT
Start: 2022-11-29 | End: 2022-11-29 | Stop reason: HOSPADM

## 2022-11-29 RX ORDER — PREGABALIN 75 MG/1
225 CAPSULE ORAL 2 TIMES DAILY
Status: DISCONTINUED | OUTPATIENT
Start: 2022-11-29 | End: 2022-11-30 | Stop reason: HOSPADM

## 2022-11-29 RX ORDER — OXYCODONE AND ACETAMINOPHEN 5; 325 MG/1; MG/1
1 TABLET ORAL
Qty: 15 TABLET | Refills: 0 | Status: SHIPPED | OUTPATIENT
Start: 2022-11-29 | End: 2022-12-02

## 2022-11-29 RX ORDER — FAMOTIDINE 20 MG/1
40 TABLET, FILM COATED ORAL EVERY EVENING
Status: DISCONTINUED | OUTPATIENT
Start: 2022-11-29 | End: 2022-11-30 | Stop reason: HOSPADM

## 2022-11-29 RX ORDER — VANCOMYCIN/0.9 % SOD CHLORIDE 1.5G/250ML
1500 PLASTIC BAG, INJECTION (ML) INTRAVENOUS ONCE
Status: COMPLETED | OUTPATIENT
Start: 2022-11-29 | End: 2022-11-29

## 2022-11-29 RX ORDER — OXYCODONE AND ACETAMINOPHEN 10; 325 MG/1; MG/1
1 TABLET ORAL
Status: DISCONTINUED | OUTPATIENT
Start: 2022-11-29 | End: 2022-11-30 | Stop reason: HOSPADM

## 2022-11-29 RX ORDER — DEXAMETHASONE SODIUM PHOSPHATE 4 MG/ML
INJECTION, SOLUTION INTRA-ARTICULAR; INTRALESIONAL; INTRAMUSCULAR; INTRAVENOUS; SOFT TISSUE AS NEEDED
Status: DISCONTINUED | OUTPATIENT
Start: 2022-11-29 | End: 2022-11-29 | Stop reason: HOSPADM

## 2022-11-29 RX ORDER — SODIUM CHLORIDE 0.9 % (FLUSH) 0.9 %
5-40 SYRINGE (ML) INJECTION EVERY 8 HOURS
Status: DISCONTINUED | OUTPATIENT
Start: 2022-11-29 | End: 2022-11-30 | Stop reason: HOSPADM

## 2022-11-29 RX ORDER — LIDOCAINE HYDROCHLORIDE 20 MG/ML
INJECTION, SOLUTION EPIDURAL; INFILTRATION; INTRACAUDAL; PERINEURAL AS NEEDED
Status: DISCONTINUED | OUTPATIENT
Start: 2022-11-29 | End: 2022-11-29 | Stop reason: HOSPADM

## 2022-11-29 RX ORDER — ONDANSETRON 2 MG/ML
4 INJECTION INTRAMUSCULAR; INTRAVENOUS AS NEEDED
Status: DISCONTINUED | OUTPATIENT
Start: 2022-11-29 | End: 2022-11-29 | Stop reason: HOSPADM

## 2022-11-29 RX ORDER — VALSARTAN 80 MG/1
320 TABLET ORAL EVERY MORNING
Status: DISCONTINUED | OUTPATIENT
Start: 2022-11-30 | End: 2022-11-30 | Stop reason: HOSPADM

## 2022-11-29 RX ORDER — PROPOFOL 10 MG/ML
INJECTION, EMULSION INTRAVENOUS
Status: DISCONTINUED | OUTPATIENT
Start: 2022-11-29 | End: 2022-11-29 | Stop reason: HOSPADM

## 2022-11-29 RX ORDER — CYCLOSPORINE 0.5 MG/ML
1 EMULSION OPHTHALMIC 2 TIMES DAILY
Status: DISCONTINUED | OUTPATIENT
Start: 2022-11-29 | End: 2022-11-30 | Stop reason: HOSPADM

## 2022-11-29 RX ORDER — PROPOFOL 10 MG/ML
INJECTION, EMULSION INTRAVENOUS AS NEEDED
Status: DISCONTINUED | OUTPATIENT
Start: 2022-11-29 | End: 2022-11-29 | Stop reason: HOSPADM

## 2022-11-29 RX ORDER — OXYCODONE AND ACETAMINOPHEN 5; 325 MG/1; MG/1
1 TABLET ORAL
Status: DISCONTINUED | OUTPATIENT
Start: 2022-11-29 | End: 2022-11-30 | Stop reason: HOSPADM

## 2022-11-29 RX ORDER — DIPHENHYDRAMINE HYDROCHLORIDE 50 MG/ML
12.5 INJECTION, SOLUTION INTRAMUSCULAR; INTRAVENOUS AS NEEDED
Status: DISCONTINUED | OUTPATIENT
Start: 2022-11-29 | End: 2022-11-29 | Stop reason: HOSPADM

## 2022-11-29 RX ORDER — FAMOTIDINE 10 MG/ML
INJECTION INTRAVENOUS AS NEEDED
Status: DISCONTINUED | OUTPATIENT
Start: 2022-11-29 | End: 2022-11-29 | Stop reason: HOSPADM

## 2022-11-29 RX ORDER — NEOSTIGMINE METHYLSULFATE 1 MG/ML
INJECTION, SOLUTION INTRAVENOUS AS NEEDED
Status: DISCONTINUED | OUTPATIENT
Start: 2022-11-29 | End: 2022-11-29 | Stop reason: HOSPADM

## 2022-11-29 RX ORDER — ALBUTEROL SULFATE 90 UG/1
2 AEROSOL, METERED RESPIRATORY (INHALATION)
Status: DISCONTINUED | OUTPATIENT
Start: 2022-11-29 | End: 2022-11-30 | Stop reason: HOSPADM

## 2022-11-29 RX ORDER — SODIUM CHLORIDE, SODIUM LACTATE, POTASSIUM CHLORIDE, CALCIUM CHLORIDE 600; 310; 30; 20 MG/100ML; MG/100ML; MG/100ML; MG/100ML
125 INJECTION, SOLUTION INTRAVENOUS CONTINUOUS
Status: DISCONTINUED | OUTPATIENT
Start: 2022-11-29 | End: 2022-11-29 | Stop reason: HOSPADM

## 2022-11-29 RX ORDER — ONDANSETRON 2 MG/ML
4 INJECTION INTRAMUSCULAR; INTRAVENOUS
Status: DISCONTINUED | OUTPATIENT
Start: 2022-11-29 | End: 2022-11-30 | Stop reason: HOSPADM

## 2022-11-29 RX ORDER — MIDAZOLAM HYDROCHLORIDE 1 MG/ML
INJECTION, SOLUTION INTRAMUSCULAR; INTRAVENOUS AS NEEDED
Status: DISCONTINUED | OUTPATIENT
Start: 2022-11-29 | End: 2022-11-29 | Stop reason: HOSPADM

## 2022-11-29 RX ORDER — SODIUM CHLORIDE 0.9 % (FLUSH) 0.9 %
5-40 SYRINGE (ML) INJECTION AS NEEDED
Status: DISCONTINUED | OUTPATIENT
Start: 2022-11-29 | End: 2022-11-30 | Stop reason: HOSPADM

## 2022-11-29 RX ORDER — SODIUM CHLORIDE, SODIUM LACTATE, POTASSIUM CHLORIDE, CALCIUM CHLORIDE 600; 310; 30; 20 MG/100ML; MG/100ML; MG/100ML; MG/100ML
50 INJECTION, SOLUTION INTRAVENOUS CONTINUOUS
Status: DISCONTINUED | OUTPATIENT
Start: 2022-11-29 | End: 2022-11-30 | Stop reason: HOSPADM

## 2022-11-29 RX ORDER — ONDANSETRON 2 MG/ML
INJECTION INTRAMUSCULAR; INTRAVENOUS AS NEEDED
Status: DISCONTINUED | OUTPATIENT
Start: 2022-11-29 | End: 2022-11-29 | Stop reason: HOSPADM

## 2022-11-29 RX ORDER — BUPIVACAINE HYDROCHLORIDE AND EPINEPHRINE 5; 5 MG/ML; UG/ML
30 INJECTION, SOLUTION EPIDURAL; INTRACAUDAL; PERINEURAL ONCE
Status: DISCONTINUED | OUTPATIENT
Start: 2022-11-29 | End: 2022-11-29 | Stop reason: HOSPADM

## 2022-11-29 RX ORDER — HYDROMORPHONE HYDROCHLORIDE 1 MG/ML
.25-1 INJECTION, SOLUTION INTRAMUSCULAR; INTRAVENOUS; SUBCUTANEOUS
Status: DISCONTINUED | OUTPATIENT
Start: 2022-11-29 | End: 2022-11-29 | Stop reason: HOSPADM

## 2022-11-29 RX ORDER — DIPHENHYDRAMINE HYDROCHLORIDE 50 MG/ML
12.5 INJECTION, SOLUTION INTRAMUSCULAR; INTRAVENOUS
Status: COMPLETED | OUTPATIENT
Start: 2022-11-29 | End: 2022-11-30

## 2022-11-29 RX ORDER — SERTRALINE HYDROCHLORIDE 50 MG/1
200 TABLET, FILM COATED ORAL EVERY EVENING
Status: DISCONTINUED | OUTPATIENT
Start: 2022-11-29 | End: 2022-11-30 | Stop reason: HOSPADM

## 2022-11-29 RX ORDER — NALOXONE HYDROCHLORIDE 0.4 MG/ML
0.4 INJECTION, SOLUTION INTRAMUSCULAR; INTRAVENOUS; SUBCUTANEOUS AS NEEDED
Status: DISCONTINUED | OUTPATIENT
Start: 2022-11-29 | End: 2022-11-30 | Stop reason: HOSPADM

## 2022-11-29 RX ORDER — HYDROMORPHONE HYDROCHLORIDE 1 MG/ML
0.5 INJECTION, SOLUTION INTRAMUSCULAR; INTRAVENOUS; SUBCUTANEOUS
Status: DISCONTINUED | OUTPATIENT
Start: 2022-11-29 | End: 2022-11-30 | Stop reason: HOSPADM

## 2022-11-29 RX ORDER — HYDROXYCHLOROQUINE SULFATE 200 MG/1
200 TABLET, FILM COATED ORAL 2 TIMES DAILY WITH MEALS
Status: DISCONTINUED | OUTPATIENT
Start: 2022-11-29 | End: 2022-11-30 | Stop reason: HOSPADM

## 2022-11-29 RX ORDER — SUCCINYLCHOLINE CHLORIDE 20 MG/ML
INJECTION INTRAMUSCULAR; INTRAVENOUS AS NEEDED
Status: DISCONTINUED | OUTPATIENT
Start: 2022-11-29 | End: 2022-11-29 | Stop reason: HOSPADM

## 2022-11-29 RX ORDER — LIDOCAINE HYDROCHLORIDE 10 MG/ML
0.1 INJECTION, SOLUTION EPIDURAL; INFILTRATION; INTRACAUDAL; PERINEURAL AS NEEDED
Status: DISCONTINUED | OUTPATIENT
Start: 2022-11-29 | End: 2022-11-29 | Stop reason: HOSPADM

## 2022-11-29 RX ORDER — HYDROMORPHONE HYDROCHLORIDE 2 MG/ML
INJECTION, SOLUTION INTRAMUSCULAR; INTRAVENOUS; SUBCUTANEOUS AS NEEDED
Status: DISCONTINUED | OUTPATIENT
Start: 2022-11-29 | End: 2022-11-29 | Stop reason: HOSPADM

## 2022-11-29 RX ORDER — ACETAMINOPHEN 325 MG/1
650 TABLET ORAL
Status: DISCONTINUED | OUTPATIENT
Start: 2022-11-29 | End: 2022-11-30 | Stop reason: HOSPADM

## 2022-11-29 RX ORDER — SODIUM CHLORIDE, SODIUM LACTATE, POTASSIUM CHLORIDE, CALCIUM CHLORIDE 600; 310; 30; 20 MG/100ML; MG/100ML; MG/100ML; MG/100ML
75 INJECTION, SOLUTION INTRAVENOUS CONTINUOUS
Status: DISCONTINUED | OUTPATIENT
Start: 2022-11-29 | End: 2022-11-29 | Stop reason: HOSPADM

## 2022-11-29 RX ORDER — PANTOPRAZOLE SODIUM 40 MG/1
40 TABLET, DELAYED RELEASE ORAL
Status: DISCONTINUED | OUTPATIENT
Start: 2022-11-30 | End: 2022-11-30 | Stop reason: HOSPADM

## 2022-11-29 RX ADMIN — NEOSTIGMINE METHYLSULFATE 2 MG: 1 INJECTION, SOLUTION INTRAVENOUS at 14:57

## 2022-11-29 RX ADMIN — LIDOCAINE HYDROCHLORIDE 20 MG: 20 INJECTION, SOLUTION INTRAVENOUS at 12:37

## 2022-11-29 RX ADMIN — HYDROMORPHONE HYDROCHLORIDE 0.5 MG: 1 INJECTION, SOLUTION INTRAMUSCULAR; INTRAVENOUS; SUBCUTANEOUS at 15:51

## 2022-11-29 RX ADMIN — GLYCOPYRROLATE 0.4 MG: 0.2 INJECTION INTRAMUSCULAR; INTRAVENOUS at 14:57

## 2022-11-29 RX ADMIN — SODIUM CHLORIDE, SODIUM LACTATE, POTASSIUM CHLORIDE, AND CALCIUM CHLORIDE 50 ML/HR: 600; 310; 30; 20 INJECTION, SOLUTION INTRAVENOUS at 17:21

## 2022-11-29 RX ADMIN — ONDANSETRON 4 MG: 2 INJECTION INTRAMUSCULAR; INTRAVENOUS at 16:30

## 2022-11-29 RX ADMIN — ROCURONIUM BROMIDE 10 MG: 10 INJECTION INTRAVENOUS at 12:37

## 2022-11-29 RX ADMIN — OXYCODONE AND ACETAMINOPHEN 1 TABLET: 5; 325 TABLET ORAL at 20:07

## 2022-11-29 RX ADMIN — FAMOTIDINE 40 MG: 20 TABLET, FILM COATED ORAL at 20:08

## 2022-11-29 RX ADMIN — DEXAMETHASONE SODIUM PHOSPHATE 4 MG: 4 INJECTION, SOLUTION INTRAMUSCULAR; INTRAVENOUS at 12:45

## 2022-11-29 RX ADMIN — HYDROMORPHONE HYDROCHLORIDE 0.5 MG: 1 INJECTION, SOLUTION INTRAMUSCULAR; INTRAVENOUS; SUBCUTANEOUS at 20:06

## 2022-11-29 RX ADMIN — SERTRALINE 200 MG: 50 TABLET, FILM COATED ORAL at 21:41

## 2022-11-29 RX ADMIN — PREGABALIN 225 MG: 75 CAPSULE ORAL at 21:37

## 2022-11-29 RX ADMIN — MIDAZOLAM HYDROCHLORIDE 2 MG: 1 INJECTION, SOLUTION INTRAMUSCULAR; INTRAVENOUS at 12:27

## 2022-11-29 RX ADMIN — MIDAZOLAM HYDROCHLORIDE 1 MG: 1 INJECTION, SOLUTION INTRAMUSCULAR; INTRAVENOUS at 13:45

## 2022-11-29 RX ADMIN — PROPOFOL 30 MG: 10 INJECTION, EMULSION INTRAVENOUS at 13:15

## 2022-11-29 RX ADMIN — HYDROXYCHLOROQUINE SULFATE 200 MG: 200 TABLET ORAL at 21:37

## 2022-11-29 RX ADMIN — HYDROMORPHONE HYDROCHLORIDE 1 MG: 2 INJECTION, SOLUTION INTRAMUSCULAR; INTRAVENOUS; SUBCUTANEOUS at 12:27

## 2022-11-29 RX ADMIN — MIDAZOLAM HYDROCHLORIDE 1 MG: 1 INJECTION, SOLUTION INTRAMUSCULAR; INTRAVENOUS at 12:55

## 2022-11-29 RX ADMIN — ONDANSETRON HYDROCHLORIDE 4 MG: 2 SOLUTION INTRAMUSCULAR; INTRAVENOUS at 14:50

## 2022-11-29 RX ADMIN — ACETAMINOPHEN 650 MG: 325 TABLET ORAL at 22:33

## 2022-11-29 RX ADMIN — SODIUM CHLORIDE, POTASSIUM CHLORIDE, SODIUM LACTATE AND CALCIUM CHLORIDE 75 ML/HR: 600; 310; 30; 20 INJECTION, SOLUTION INTRAVENOUS at 11:35

## 2022-11-29 RX ADMIN — HYDROMORPHONE HYDROCHLORIDE 0.5 MG: 1 INJECTION, SOLUTION INTRAMUSCULAR; INTRAVENOUS; SUBCUTANEOUS at 16:54

## 2022-11-29 RX ADMIN — VANCOMYCIN HYDROCHLORIDE 1500 MG: 10 INJECTION, POWDER, LYOPHILIZED, FOR SOLUTION INTRAVENOUS at 11:53

## 2022-11-29 RX ADMIN — PROPOFOL 150 MG: 10 INJECTION, EMULSION INTRAVENOUS at 12:37

## 2022-11-29 RX ADMIN — HYDROMORPHONE HYDROCHLORIDE 0.5 MG: 1 INJECTION, SOLUTION INTRAMUSCULAR; INTRAVENOUS; SUBCUTANEOUS at 16:17

## 2022-11-29 RX ADMIN — SUCCINYLCHOLINE CHLORIDE 100 MG: 20 INJECTION, SOLUTION INTRAMUSCULAR; INTRAVENOUS at 12:38

## 2022-11-29 RX ADMIN — FAMOTIDINE 20 MG: 10 INJECTION INTRAVENOUS at 12:27

## 2022-11-29 RX ADMIN — PROPOFOL 150 MCG/KG/MIN: 10 INJECTION, EMULSION INTRAVENOUS at 12:41

## 2022-11-29 NOTE — BRIEF OP NOTE
Brief Postoperative Note    Patient: Aspen Chun  YOB: 1960  MRN: [id number]    Date of Procedure: 11/29/2022     Pre-Op Diagnosis: LEFT BREAST CANCER    Post-Op Diagnosis: Same as preoperative diagnosis. Procedure(s):  BILATERAL BREAST TOTAL MASTECTOMIES, LEFT BREAST SENTINEL NODE BIOPSY    Surgeon(s):  Gemini Field MD    Surgical Assistant: Surg Asst-1: Rich ARAUJO    Anesthesia: General     Estimated Blood Loss (mL): less than 50     Complications: None    Specimens:   ID Type Source Tests Collected by Time Destination   1 : Right Breast Preservative Breast  Gemini Field MD 11/29/2022 1317 Pathology   2 : Left Axillary Meredith Node 1 Preservative Axillary  Gemini Field MD 11/29/2022 1330 Pathology   3 : Left Axillary Meredith Node 2 Preservative Axillary  Gemini Field MD 11/29/2022 1357 Pathology   4 : Left Axillary Meredith Node 3 Preservative Axillary  Gemini Field MD 11/29/2022 1358 Pathology   5 : Left Breast Preservative Breast  Gemini Field MD 11/29/2022 1404 Pathology        Implants: * No implants in log *    Drains:   Warner-Antunez Drain 11/29/22 Right; Lower Breast (Active)   Site Assessment Clean, dry, & intact 11/29/22 1416   Dressing Status Clean, dry, & intact 11/29/22 1416   Status Patent; Charged;Draining 11/29/22 1416   Drainage Color Sanguinous 11/29/22 1416       Warner-Antunez Drain 11/29/22 Left; Lower Breast (Active)   Site Assessment Clean, dry, & intact 11/29/22 1416   Dressing Status Clean, dry, & intact 11/29/22 1416   Status Patent; Charged;Draining 11/29/22 1416   Drainage Color Sanguinous 11/29/22 1416       Findings: bilateral breasts, left sent nodes x 3    Electronically Signed by Yuliana Ferreira MD on 75/74/7977 at 2:51 PM

## 2022-11-29 NOTE — PERIOP NOTES
PACU IN REPORT FROM ANESTHESIA    Verbal report received from   [facility]   [] MD/DO-Anesthesiologist    [x] CRNA   [] with student    CHOICE ANESTHESIA:  [x] GENERAL  [x] TIVA  [] MAC  [] LOCAL  [] REGIONAL  [] SPINAL   [] EPIDURAL   **Note the anesthesia record for medications given intraoperatively. **           [] E.R.A.S. PROTOCOL    SURGICAL PROCEDURE: Procedure(s) (LRB):  BILATERAL BREAST TOTAL MASTECTOMIES, LEFT BREAST SENTINEL NODE BIOPSY (Bilateral)     SURGEON: Colten Lucio MD.    Brief Initial Visual Assessment:    Patient Age: [] Infant(1-12mo)      []Pediatric(1-13yrs)    [] Adolescent(13-18yrs)    [x] Adult(18-65yrs)      []Geriatric Adult(>65yrs). Patient    [] Alert           []Calm & Cooperative      [] Anxious  Appearance: [x] Drowsy      [x] Sedated      [] Unresponsive     Oriented x  1            Airway:     [x] Patent          [] \"Difficult Airway\" report by Anesthesia                        [] Obstructs easily/Obstructed on arrival          [] Manual stimulation and/or airway assistance necessary                         [] Airway improved with head/airway repositioning                       Airway Adjuncts Present: [] Oral Airway    [] Nasal Trumpet    [] ETT    [] LMA            Respiratory  [x] Even   [] Labored   [] Shallow   [] Tachypnea   [] Bradypnea  Pattern:    [x] Non-Labored  [] VENT and/or respiratory assistance     being provided. Skin:     [x] Pink [] Dusky    [] Pale        [x] Warm    [] Hot [] Cool       [] Cold   [x]Dry [] Moist [] Diaphoretic     Membranes:  [x] Pink [] Pale       [x] Moist [] Dry     [] Crusty     Pain:   [x] No Acute Discomfort. 0  /10 Scale [] Verbal Numeric   [] Moderate Discomfort.     [] V.A.S. [] Acute Discomfort. [x] A.N.V.    [] Chronic-Issue Related Discomfort.   [] F.L.A.C.C. Note E-MAR for medications administered.   []Faces, Celeste/Baker    Note assessments documented in flowsheets; any assessment variants to be found in comments or narrative perioperative nurse notes.        Post-anesthesia care now assumed by [facility] BSN, RN-BC

## 2022-11-29 NOTE — ANESTHESIA PREPROCEDURE EVALUATION
Relevant Problems   RESPIRATORY SYSTEM   (+) Asthma      CARDIOVASCULAR   (+) Hypertension   (+) Murmur, cardiac      GASTROINTESTINAL   (+) GERD (gastroesophageal reflux disease)      HEMATOLOGY   (+) Acute posthemorrhagic anemia      PERSONAL HX & FAMILY HX OF CANCER   (+) Infiltrating ductal carcinoma of left breast (HCC)       Anesthetic History   No history of anesthetic complications            Review of Systems / Medical History  Patient summary reviewed, nursing notes reviewed and pertinent labs reviewed    Pulmonary    COPD    Sleep apnea: CPAP           Neuro/Psych         Psychiatric history    Comments: Peripheral neuropathy Cardiovascular    Hypertension: well controlled          Hyperlipidemia    Exercise tolerance: >4 METS     GI/Hepatic/Renal     GERD           Endo/Other        Obesity, arthritis and cancer    Comments: Left breast cancer Other Findings   Comments: SLE           Physical Exam    Airway  Mallampati: III  TM Distance: 4 - 6 cm  Neck ROM: normal range of motion   Mouth opening: Normal     Cardiovascular      Rate: normal         Dental    Dentition: Edentulous and Implants     Pulmonary  Breath sounds clear to auscultation               Abdominal         Other Findings            Anesthetic Plan    ASA: 3  Anesthesia type: general          Induction: Intravenous  Anesthetic plan and risks discussed with: Patient      Informed consent obtained.

## 2022-11-29 NOTE — DISCHARGE INSTRUCTIONS
Discharge Instructions from Dr. Almaz Tripp    I will call you with the pathology results, typically within 1 week from today. You may shower, but no hot tubs, swimming pools, or baths until your incision is healed. No heavy lifting with the affected extremity (nothing greater than 5 pounds), and limit its use for the next 4-5 days. You may use an ice pack for comfort for the next couple of days, but do not place ice directly on the skin. Rather, use a towel or clothing to serve as a barrier between skin and ice to prevent injury. If I placed a drain, follow the drain instructions provided, especially as you keep a record of the drain output. Follow medication instructions carefully. Watch for signs of infection as listed below. Redness  Swelling  Drainage from the incision or from your nipple that appears infected  Fever over 101 degrees for consecutive readings, or over 99.5 if you are currently undergoing chemotherapy. Call our office (number is below) for a follow-up appointment. If you have any problems, our phone number is [phone]. DISCHARGE SUMMARY from your Nurse      PATIENT INSTRUCTIONS    After general anesthesia or intravenous sedation, for 24 hours or while taking prescription Narcotics:  Limit your activities  Do not drive and operate hazardous machinery  Do not make important personal or business decisions  Do  not drink alcoholic beverages  If you have not urinated within 8 hours after discharge, please contact your surgeon on call.     Report the following to your surgeon:  Excessive pain, swelling, redness or odor of or around the surgical area  Temperature over 100.5  Nausea and vomiting lasting longer than 4 hours or if unable to take medications  Any signs of decreased circulation or nerve impairment to extremity: change in color, persistent  numbness, tingling, coldness or increase pain  Any questions      GOOD HELP TO FIGHT AN INFECTION  Here are a few tip to help reduce the chance of getting an infection after surgery:  Wash Your Hands  Good handwashing is the most important thing you and your caregiver can do. Wash before and after caring for any wounds. Dry your hand with a clean towel. Wash with soap and water for at least 20 seconds. A TIP: sing the \"Happy Birthday\" song through one time while washing to help with the timing. Use a hand  in between washings. Shower  When your surgeon says it is OK to take a shower, use a new bar of antibacterial soap (if that is what you use, and keep that bar of soap ONLY for your use), or antibacterial body wash. Use a clean wash cloth or sponge when you bathe. Dry off with a clean towel  after every bath - be careful around any wounds, skin staples, sutures or surgical glue over/on wounds. Do not enter swimming pools, hot tubs, lakes, rivers and/or ocean until wounds are healed and your doctor/surgeon says it is OK. Use Clean Sheets  Sleep on freshly laundered sheets after your surgery. Keep the surgery site covered with a clean, dry bandage (if instructed to do so). If the bandage becomes soiled, reapply a new, dry, clean bandage. Do not allow pets to sleep with you while your wound is healing. Lifestyle Modification and Controlling Your Blood Sugar  Smoking slows wound healing. Stop smoking and limit exposure to second-hand smoke. High blood sugar slows wound healing. Eat a well-balanced diet to provide proper nutrition while healing  Monitor your blood sugar (if you are a diabetic) and take your medications as you are suppose to so you can control you blood sugar after surgery. COUGH AND DEEP BREATHE    Breathing deeply and coughing are very important exercises to do after surgery. Deep breathing and coughing open the little air tubes and air sacks in your lungs. You take deep breaths every day.   You may not even notice - it is just something you do when you sigh or yawn.  It is a natural exercise you do to keep these air passages open. After surgery, take deep breaths and cough, on purpose. DIRECTIONS:  Take 10 to 15 slow deep breaths every hour while awake. Breathe in deeply, and hold it for 2 seconds. Exhale slowly through puckered lips, like blowing up a balloon. After every 4th or 5th deep breath, hug your pillow to your chest or belly and give a hard, deep cough. Yes, it will probably hurt. But doing this exercise is a very important part of healing after surgery. Take your pain medicine to help you do this exercise without too much pain. Coughing and deep breathing help prevent bronchitis and pneumonia after surgery. If you had chest or belly surgery, use a pillow as a \"hug boubacar\" and hold it tightly to your chest or belly when you cough. ANKLE PUMPS    Ankle pumps increase the circulation of oxygenated blood to your lower extremities and decrease your risk for circulation problems such as blood clots. They also stretch the muscles, tendons and ligaments in your foot and ankle, and prevent joint contracture in the ankle and foot, especially after surgeries on the legs. It is important to do ankle pump exercises regularly after surgery because immobility increases your risk for developing a blood clot. Your doctor may also have you take an Aspirin for the next few days as well. If your doctor did not ask you to take an Aspirin, consult with him before starting Aspirin therapy on your own. The exercise is quite simple. Slowly point your foot forward, feeling the muscles on the top of your lower leg stretch, and hold this position for 5 seconds. Next, pull your foot back toward you as far as possible, stretching the calf muscles, and hold that position for 5 seconds. Repeat with the other foot.   Perform 10 repetitions every hour while awake for both ankles if possible (down and then up with the foot once is one repetition). You should feel gentle stretching of the muscles in your lower leg when doing this exercise. If you feel pain, or your range of motion is limited, don't push too hard. Only go the limit your joint and muscles will let you go. If you have increasing pain, progressively worsening leg warmth or swelling, STOP the exercise and call your doctor. MEDICATION AND   SIDE EFFECT GUIDE    The [facility] Insurance MEDICATION AND SIDE EFFECT GUIDE was provided to the PATIENT AND CARE PROVIDER. Information provided includes instruction about drug purpose and common side effects for the following medications:   ***        These are general instructions for a healthy lifestyle:    *   Please give a list of your current medications to your Primary Care Provider. *   Please update this list whenever your medications are discontinued, doses are changed, or new medications (including over-the-counter products) are added. *   Please carry medication information at all times in case of emergency situations. About Smoking  No smoking / No tobacco products  Avoid exposure to second hand smoke     Surgeon General's Warning:  Quitting smoking now greatly reduces serious risk to your health. Obesity, smoking, and sedentary lifestyle greatly increases your risk for illness and disease. A healthy diet, regular physical exercise & weight monitoring are important for maintaining a healthy lifestyle. Congestive Heart Failure  You may be retaining fluid if you have a history of heart failure or if you experience any of the following symptoms:  Weight gain of 3 pounds or more overnight or 5 pounds in a week, increased swelling in your hands or feet or shortness of breath while lying flat in bed. Please call your doctor as soon as you notice any of these symptoms; do not wait until your next office visit.       Recognize signs and symptoms of STROKE:  F -  Face looks uneven  A -  Arms unable to move or move evenly  S -  Speech slurred or non-existent  T -  Time-call 911 as soon as signs and symptoms begin-DO NOT go          back to bed or wait to see if you get better-TIME IS BRAIN. Warning Signs of HEART ATTACK   Call 911 if you have these symptoms:    Chest discomfort. Most heart attacks involve discomfort in the center of the chest that lasts more than a few minutes, or that goes away and comes back. It can feel like uncomfortable pressure, squeezing, fullness, or pain. Discomfort in other areas of the upper body. Symptoms can include pain or discomfort in one or both arms, the back, neck, jaw, or stomach. Shortness of breath with or without chest discomfort. Other signs may include breaking out in a cold sweat, nausea, or lightheadedness. Don't wait more than five minutes to call 911 - MINUTES MATTER! Fast action can save your life. Calling 911 is almost always the fastest way to get lifesaving treatment. Emergency Medical Services staff can begin treatment when they arrive -- up to an hour sooner than if someone gets to the hospital by car. Learning About Coronavirus [phone])  Coronavirus [phone]): Overview  What is coronavirus (COVID-19)? The coronavirus disease (COVID-19) is caused by a virus. It is an illness that was first found in [place], [place], in December 2019. It has since spread worldwide. The virus can cause fever, cough, and trouble breathing. In severe cases, it can cause pneumonia and make it hard to breathe without help. It can cause death. Coronaviruses are a large group of viruses. They cause the common cold. They also cause more serious illnesses like Middle East respiratory syndrome (MERS) and severe acute respiratory syndrome (SARS). COVID-19 is caused by a novel coronavirus. That means it's a new type that has not been seen in people before. This virus spreads person-to-person through droplets from coughing and sneezing.  It can also spread when you are close to someone who is infected. And it can spread when you touch something that has the virus on it, such as a doorknob or a tabletop. What can you do to protect yourself from coronavirus (COVID-19)? The best way to protect yourself from getting sick is to: Avoid areas where there is an outbreak. Avoid contact with people who may be infected. Wash your hands often with soap or alcohol-based hand sanitizers. Avoid crowds and try to stay at least 6 feet away from other people. Wash your hands often, especially after you cough or sneeze. Use soap and water, and scrub for at least 20 seconds. If soap and water aren't available, use an alcohol-based hand . Avoid touching your mouth, nose, and eyes. What can you do to avoid spreading the virus to others? To help avoid spreading the virus to others:  Cover your mouth with a tissue when you cough or sneeze. Then throw the tissue in the trash. Use a disinfectant to clean things that you touch often. Stay home if you are sick or have been exposed to the virus. Don't go to school, work, or public areas. And don't use public transportation. If you are sick:  Leave your home only if you need to get medical care. But call the doctor's office first so they know you're coming. And wear a face mask, if you have one. If you have a face mask, wear it whenever you're around other people. It can help stop the spread of the virus when you cough or sneeze. Clean and disinfect your home every day. Use household  and disinfectant wipes or sprays. Take special care to clean things that you grab with your hands. These include doorknobs, remote controls, phones, and handles on your refrigerator and microwave. And don't forget countertops, tabletops, bathrooms, and computer keyboards. When to call for help  Call 911 anytime you think you may need emergency care. For example, call if:  You have severe trouble breathing.  (You can't talk at all.)  You have constant chest pain or pressure. You are severely dizzy or lightheaded. You are confused or can't think clearly. Your face and lips have a blue color. You pass out (lose consciousness) or are very hard to wake up. Call your doctor now if you develop symptoms such as:  Shortness of breath. Fever. Cough. If you need to get care, call ahead to the doctor's office for instructions before you go. Make sure you wear a face mask, if you have one, to prevent exposing other people to the virus. Where can you get the latest information? The following health organizations are tracking and studying this virus. Their websites contain the most up-to-date information. Herlinda Gins also learn what to do if you think you may have been exposed to the virus. U.[place]. Centers for Disease Control and Prevention (CDC): The CDC provides updated news about the disease and travel advice. The website also tells you how to prevent the spread of infection. www.cdc.gov  World Health Organization [facility]): WHO offers information about the virus outbreaks. WHO also has travel advice. www.who.int  Current as of: April 1, 2020               Content Version: 12.4  © 3908-8845 [employer]. Care instructions adapted under license by your healthcare professional. If you have questions about a medical condition or this instruction, always ask your healthcare professional. [address] any warranty or liability for your use of this information. The discharge information has been reviewed with the {PATIENT PARENT GUARDIAN:71054}. Any questions and concerns from the {PATIENT PARENT GUARDIAN:13036} have been addressed. The {PATIENT PARENT GUARDIAN:39319} verbalized understanding.         CONTENTS FOUND IN YOUR DISCHARGE ENVELOPE:  [x]     Surgeon and Hospital Discharge Instructions  [x]     [facility] Care Provider Card  []     Medication & Side Effect Guide            (your newly prescribed medications have been marked/highlighted showing the most common side effects from   the classes of drugs on your prescriptions)  [x]     Medication Prescription(s) x *** ( [] These have been sent electronically to your pharmacy by your surgeon,   - OR -       your surgeon has already provided these to you during a previous/pre-op office visit)  [x]     [facility] Education Information  []     Physical Therapy Prescription  []     Follow-up Appointment Cards  []     Surgery-related Pictures/Media  []     Pain block and/or block with On-Q Catheter from Anesthesia Service (information included in your instructions above)  []     Medical device information sheets/pamphlets from their    []     School/work excuse note. []     /parent work excuse note. The following personal items collected during your admission are returned to you:   Dental Appliance: Dental Appliances: Uppers, Lowers (at home)  Vision: Visual Aid: Glasses  Hearing Aid:    Jewelry: Jewelry: None  Clothing: Clothing: Undergarments, Footwear, Shirt, Pants, Socks  Other Valuables:  Other Valuables: Eyeglasses  Valuables sent to safe:

## 2022-11-29 NOTE — ANESTHESIA POSTPROCEDURE EVALUATION
Procedure(s):  BILATERAL BREAST TOTAL MASTECTOMIES, LEFT BREAST SENTINEL NODE BIOPSY. general    Anesthesia Post Evaluation      Multimodal analgesia: multimodal analgesia used between 6 hours prior to anesthesia start to PACU discharge  Patient location during evaluation: PACU  Patient participation: complete - patient participated  Level of consciousness: awake and alert  Pain management: adequate  Airway patency: patent  Anesthetic complications: no  Cardiovascular status: acceptable  Respiratory status: acceptable  Hydration status: acceptable  Post anesthesia nausea and vomiting:  none  Final Post Anesthesia Temperature Assessment:  Normothermia (36.0-37.5 degrees C)      INITIAL Post-op Vital signs:   Vitals Value Taken Time   /67 11/29/22 1640   Temp 37.3 °C (99.1 °F) 11/29/22 1532   Pulse 56 11/29/22 1643   Resp 15 11/29/22 1643   SpO2 100 % 11/29/22 1643   Vitals shown include unvalidated device data.

## 2022-11-29 NOTE — H&P
HISTORY OF PRESENT ILLNESS  Lindaann Barthel is a 57 y.o. female. HPI  NEW patient consult referred by  Dr. Chuy Ford for LEFT breast IDC. Found on imaging. Denies pain or changes to the breast area. Family History:  None              Breast imaging-   MRI Results (most recent):  Results from Hospital Encounter encounter on 11/10/22     MRI BREAST BI W WO CONT     Narrative  INDICATION: Left breast carcinoma     COMPARISON: Multiple prior breast imaging studies from October 2022. TECHNIQUE:  Multisequence, multiplanar, bilateral breast MRI was performed in prone position  using a dedicated breast coil. Images were obtained without contrast and dynamic  postcontrast images were obtained in multiple phases. 9 mL IV Gadavist was  administered. Subtraction images were reconstructed. Postcontrast images were  reviewed with dedicated kinetic analysis software. FINDINGS:  There is mild background parenchymal enhancement and heterogeneous  fibroglandular tissue. The spiculated mass at 10:00 in the middle to posterior  third of the left breast measures 9 x 12 x 12 mm. A biopsy clip lies immediately  anterior to it. Multiple, subcentimeter, satellite masses in a segmental/ray  distribution extend over 62 mm anteroinferiorly in the upper inner quadrant. The  anteroinferior most mass is at the level of the nipple line. No axillary or  internal mammary chain lymphadenopathy. No suspicious contralateral enhancement. No right lymphadenopathy. A summary portfolio with key images has been sent from kinetic analysis software  to PACS. Impression  1. Left upper inner quadrant breast mass (9 x 12 mm). BI-RADS 6.  2. Multiple satellite masses, and a segmental distribution, extending from  anterior to posterior third, over 62 mm. BI-RADS 4.  3. No lymphadenopathy. 4. No suspicious contralateral enhancement. 5. Overall assessment: BI-RADS Assessment Category 4: Suspicious abnormality.   6. Recommendation: MRI guided left breast biopsy could be performed if it would  . 10/31/2022: LEFT breast mass, ultrasound-guided biopsy. PATH: IDC with tubular features, 0.4cm in greatest dimension in a single core, grade 1, ER+(100%), SC+(100%), HER2-, Ki-67(5%). Past Medical History:   Diagnosis Date    Abnormal screening mammogram 10/10/2022     focal asymmetry in the left breast, slightly medial to the nipple line in the deep 3rd    Asthma       activity induced asthma     Autoimmune disease ([address]       mixed connective tissue disease    Cancer ([address]       skin-basal    Chronic pain       lower back, joints    DDD (degenerative disc disease), lumbar      Dense breast tissue on mammogram 10/02/2020     BI-RADS 1: Negative.     DJD (degenerative joint disease)      ETOH abuse       Sober 12-13-11    GERD (gastroesophageal reflux disease)      H/O diagnostic mammography 10/19/2022     BI-RADS Assessment Category 5: Highly suggestive of malignancy- Appropriate NEEDS BIOPSY LEFT BREAST    Hernia      Hx of bronchitis      Hx of mammogram 10/04/2021     Negative     Hx of seizure disorder 2010     unknown cause, none since    Hx of sinusitis      Hyperlipemia      Hypertension      Hypertriglyceridemia      Hyponatremia      Insomnia      Lupus ([address]      Murmur, cardiac      Neuropathic pain      Osteopenia       DEXA (2019), started on Fosamax per rheumatology    Other bursitis of hip, right hip 09/14/2016     Had a deep hip injection for pain     Psychiatric disorder       anxiety    Routine Papanicolaou smear 09/20/2016     Negative (no hpv)     Seizures ([address] 2011     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     Stroke ([address] 03/2011     PCP states she had a TIA - patient denies this (was low Na), pt denies this    Tobacco abuse       Stopped in 2012               Past Surgical History:   Procedure Laterality Date    HX CYST INCISION AND DRAINAGE Left 08/2016    HX HERNIA REPAIR   10/7/2014 incisional with mesh    HX LAP CHOLECYSTECTOMY   3/11/2014    HX LUMBAR FUSION         10/8/2012    HX LUMBAR LAMINECTOMY         rods in 1996, out 1998    HX LUMBAR LAMINECTOMY   02/2017     [address]     back surgery    HX ORTHOPAEDIC Bilateral 2013 R, 2014 L     carpal tunnel    HX ORTHOPAEDIC   6/27/2013     right knee arthroscopy    HX ORTHOPAEDIC Right 3/2015     trigger thumb release    HX ORTHOPAEDIC   10/8/2012     ALIF/ PLIF with bone stimulator    HX ORTHOPAEDIC   10/23/2012     Back surgery to adjust hardware    HX ORTHOPAEDIC   01/2018     ALIF - fusion L2-L3    HX ORTHOPAEDIC   04/01/2019     Back surgery    HX ORTHOPAEDIC Left 06/2021     toe sx    HX OTHER SURGICAL   10/08/2012     [facility] Bone Growth Stimulator    HX TONSIL AND ADENOIDECTOMY   1962    HX TOTAL ABDOMINAL HYSTERECTOMY   12/20/1992     fibroids; Dr. Arias Sing History            Socioeconomic History    Marital status:        Spouse name: Not on file    Number of children: Not on file    Years of education: Not on file    Highest education level: Not on file   Occupational History    Not on file   Tobacco Use    Smoking status: Former       Packs/day: 2.00       Years: 32.00       Pack years: 64.00       Types: Cigarettes       Quit date: 7/1/2012       Years since quitting: 10.3    Smokeless tobacco: Never    Tobacco comments:       Never used vapor or e-cigs    Substance and Sexual Activity    Alcohol use: No       Alcohol/week: 0.0 standard drinks       Comment: Sober 11 years    Drug use: No    Sexual activity: Not Currently       Birth control/protection: Surgical   Other Topics Concern    Not on file   Social History Narrative    Not on file      Social Determinants of Health      Financial Resource Strain: Not on file   Food Insecurity: Not on file   Transportation Needs: Not on file   Physical Activity: Not on file   Stress: Not on file   Social Connections: Not on file   Intimate Partner Violence: Not on file   Housing Stability: Not on file                Current Outpatient Medications on File Prior to Visit   Medication Sig Dispense Refill    ALPRAZolam (XANAX) 0.5 mg tablet Take 1 Tablet by mouth three (3) times daily as needed for Anxiety (take as directed). Max Daily Amount: 1.5 mg. Indications: anxious 12 Tablet 0    clobetasoL (TEMOVATE) 0.05 % ointment Apply to affected area daily for 1 month, then every other day for 1 month, then continue 2x/wk for maintenance. 45 g 3    estradioL (ESTRACE) 0.01 % (0.1 mg/gram) vaginal cream Insert 0.5gm vaginally or apply to affected area twice a week. 42.5 g 3    sertraline 200 mg cap Take 200 mg by mouth every evening. sucralfate (CARAFATE) 1 gram tablet Take 1 g by mouth daily as needed. valsartan (DIOVAN) 320 mg tablet Take 320 mg by mouth Every morning. famotidine (PEPCID) 40 mg tablet Take 40 mg by mouth every evening. copper gluconate 2 mg capsule Take 2 mg by mouth daily. dexlansoprazole (DEXILANT) 60 mg CpDB capsule (delayed release) Take 1 Capsule by mouth Every morning. furosemide (LASIX) 20 mg tablet Take 20 mg by mouth daily as needed. pregabalin (LYRICA) 225 mg capsule Take 225 mg by mouth two (2) times a day. albuterol (PROVENTIL HFA, VENTOLIN HFA, PROAIR HFA) 90 mcg/actuation inhaler Take 2 Puffs by inhalation every six (6) hours as needed for Wheezing. cyanocobalamin 1,000 mcg tablet Take 1,000 mcg by mouth daily. cycloSPORINE (RESTASIS) 0.05 % dpet Administer 1 Drop to both eyes two (2) times a day. folic acid (FOLVITE) 1 mg tablet Take 2 mg by mouth daily. gemfibroziL (LOPID) 600 mg tablet Take 600 mg by mouth Before breakfast and dinner. hydrOXYchloroQUINE (PLAQUENIL) 200 mg tablet Take 200 mg by mouth two (2) times a day. cholecalciferol, vitamin D3, 50 mcg (2,000 unit) tab Take 2,000 Units by mouth daily.         sodium chloride 1 gram tablet Take 1 g by mouth daily as needed. therapeutic multivitamin (THERAGRAN) tablet Take 1 Tab by mouth daily. pyridoxine, vitamin B6, (VITAMIN B-6) 100 mg tablet Take 100 mg by mouth daily. No current facility-administered medications on file prior to visit. Allergies   Allergen Reactions    Penicillins Rash       Fever. 16 Reports able to take Keflex without problem. Tolerated ancef in     Tramadol Shortness of Breath    Adhesive Tape-Silicones Rash         OB History            0    Para   0    Term   0            AB        Living               SAB        IAB        Ectopic        Molar        Multiple        Live Births   0           Obstetric Comments   Menarche:  4. LMP: 32.  # of Children:  0. Age at Delivery of First Child:  n/a. Hysterectomy/oophorectomy:  YES/NO. Breast Bx:  Yes left . Hx of Breast Feeding:  n/a. BCP:  no. Hormone therapy:  no.                    ROS        Physical Exam  Exam conducted with a chaperone present. Constitutional:       Appearance: She is well-developed. She is not diaphoretic. HENT:      Head: Normocephalic and atraumatic. Right Ear: External ear normal.      Left Ear: External ear normal.   Eyes:      General: No scleral icterus. Right eye: No discharge. Left eye: No discharge. Pupils: Pupils are equal, round, and reactive to light. Neck:      Thyroid: No thyromegaly. Vascular: No JVD. Trachea: No tracheal deviation. Cardiovascular:      Rate and Rhythm: Normal rate and regular rhythm. Heart sounds: Normal heart sounds. Pulmonary:      Effort: Pulmonary effort is normal. No tachypnea, accessory muscle usage or respiratory distress. Breath sounds: Normal breath sounds. No stridor. Chest:   Breasts:     Breasts are symmetrical.      Right: No inverted nipple, mass, nipple discharge, skin change or tenderness.       Left: No inverted nipple, mass, nipple discharge, skin change or tenderness. Abdominal:      General: There is no distension. Palpations: Abdomen is soft. There is no mass. Tenderness: There is no abdominal tenderness. Musculoskeletal:         General: Normal range of motion. Cervical back: Normal range of motion and neck supple. Lymphadenopathy:      Cervical: No cervical adenopathy. Skin:     General: Skin is warm and dry. Neurological:      Mental Status: She is alert and oriented to person, place, and time. Psychiatric:         Speech: Speech normal.         Behavior: Behavior normal.         Thought Content: Thought content normal.         Judgment: Judgment normal.            BREAST ULTRASOUND, Pre-op Planning  Indication: Pre-op planning, LEFT breast, 9:00. Technique: The area was scanned using a high-frequency linear-array near-field transducer  Findings: Irregular hypoechoic mass with clip and really dense breast tissue. Impression: Breast cancer  Disposition: Surgery           ASSESSMENT and PLAN      ICD-10-CM ICD-9-CM     1. Infiltrating ductal carcinoma of left breast (HCC)  C50.912 174.9            New pt presents for consultation for treatment of LEFT breast IDC, ER+(100%)/TN+(100%)/HER2-, Ki-67 (5%). Upon physical examination noted nothing palpable. Ultrasound visualizes irregular hypoechoic mass with clip at 9:00 of LEFT breast and really dense breast tissue. We had a long discussion of options for treatment. The patient was accompanied by her friend during this encounter, and over half the time was spent on counseling and coordination of care. We discussed in depth the pathology results and surgical planning. The goals of treatment are to treat the breast, and to reduce risk of local or distant recurrence. Discussed treatment options with risks, complications, benefits, and limitations, including lumpectomy with XRT, and mastectomy with optional reconstruction, both having the same cure rate. Explained the importance of negative margins, and the need for re-excision in the case of positive margins. Will plan to mobilize breast tissue during lumpectomy to minimize cosmetic defect. Also discussed benefit and technique of SLNBx of 3-4 LNs. We also covered risk reduction strategies including XRT, chemotherapy, and hormonal therapy with estrogen blocker. We also discussed the pts questions and concerns such as comparing risk reduction with lumpectomy vs mastectomy. Question regarding recovery. Pt has elected to proceed with BILATERAL mastectomy with no reconstruction. Pt should feel free to call Rose Thompson or Eunice Salinas, nurse navigators, with any further questions. This plan was reviewed with the patient and patient agrees. All questions were answered.

## 2022-11-29 NOTE — PERIOP NOTES
Family / caregiver update provided, questions answered. Location for room pending assignment. Will update PRN.       4:39 PM  Family brought to bedside - questions answered - still no bed assignment. 5:31 PM  Most recent update from nursing supervisor via discussion with : pt will not be getting a med/surg room tonight. Therefore will be overnight stay in MAIN [facility] OP #17 - family updated and will transfer to [address] [place] [address] shortly. 6:33 PM  Handoff to [address] [place] [address] at staff change - Pt resting NAD, VSS, Questions asked/answered. S/O home at 5:35 PM after above info provided.

## 2022-11-30 VITALS
OXYGEN SATURATION: 100 % | SYSTOLIC BLOOD PRESSURE: 142 MMHG | HEART RATE: 59 BPM | DIASTOLIC BLOOD PRESSURE: 78 MMHG | WEIGHT: 187.17 LBS | BODY MASS INDEX: 31.18 KG/M2 | HEIGHT: 65 IN | RESPIRATION RATE: 16 BRPM | TEMPERATURE: 99.2 F

## 2022-11-30 PROCEDURE — 74011250637 HC RX REV CODE- 250/637: Performed by: SURGERY

## 2022-11-30 PROCEDURE — 74011250636 HC RX REV CODE- 250/636: Performed by: SURGERY

## 2022-11-30 PROCEDURE — G0378 HOSPITAL OBSERVATION PER HR: HCPCS

## 2022-11-30 PROCEDURE — 74011000250 HC RX REV CODE- 250: Performed by: SURGERY

## 2022-11-30 RX ADMIN — PANTOPRAZOLE SODIUM 40 MG: 40 TABLET, DELAYED RELEASE ORAL at 07:05

## 2022-11-30 RX ADMIN — OXYCODONE AND ACETAMINOPHEN 1 TABLET: 5; 325 TABLET ORAL at 03:10

## 2022-11-30 RX ADMIN — OXYCODONE AND ACETAMINOPHEN 1 TABLET: 5; 325 TABLET ORAL at 07:06

## 2022-11-30 RX ADMIN — VALSARTAN 320 MG: 80 TABLET, FILM COATED ORAL at 08:06

## 2022-11-30 RX ADMIN — DIPHENHYDRAMINE HYDROCHLORIDE 12.5 MG: 50 INJECTION, SOLUTION INTRAMUSCULAR; INTRAVENOUS at 00:13

## 2022-11-30 RX ADMIN — SODIUM CHLORIDE, PRESERVATIVE FREE 10 ML: 5 INJECTION INTRAVENOUS at 00:24

## 2022-11-30 RX ADMIN — HYDROMORPHONE HYDROCHLORIDE 0.5 MG: 1 INJECTION, SOLUTION INTRAMUSCULAR; INTRAVENOUS; SUBCUTANEOUS at 00:12

## 2022-11-30 RX ADMIN — HYDROXYCHLOROQUINE SULFATE 200 MG: 200 TABLET ORAL at 08:07

## 2022-11-30 NOTE — PROGRESS NOTES
Doing well, no complaints    AVSS    WILFREDO serosanguineous    Flaps look great, mild ecchymosis, no seroma or hematoma    OK for DC today.

## 2022-11-30 NOTE — PROGRESS NOTES
0700,report received from off-going RN. Pt medicated with Oxy 5mg po for chest incisional pain 6/10. Pt assisted to BR to void,tolerated well.  85:86,VERONIQUE Pacheco in to see pt. DC orders received. Pt ate breakfast and is waiting for her spouse to come get her.  0900,bilateral chest wounds redressed,demonstration given to pt's spouse,drains emptied,supplies given to pt to take home in case beth shankar. Pt and spouse verbalize understanding of all DC instructions. 9:15,pt Dc'd via GAYLE Bond 23.

## 2022-11-30 NOTE — PERIOP NOTES
Bedside, Verbal, and Written shift change report given to DI Hanks (oncoming nurse) by DI CASEY (offgoing nurse). Report included the following information SBAR, OR Summary, Procedure Summary, Intake/Output, MAR, and Cardiac Rhythm NSR .

## 2022-11-30 NOTE — OP NOTES
[facility]  OPERATIVE REPORT    Name:  Richie English  MR#:  [id number]  :  1960  ACCOUNT #:  [de-identified]  DATE OF SERVICE:  2022    PREOPERATIVE DIAGNOSIS:  Carcinoma of the left breast.    POSTOPERATIVE DIAGNOSIS:  Carcinoma of the left breast.    PROCEDURE PERFORMED:  Bilateral total mastectomies with left sentinel node biopsy. SURGEON:  Arturo Medrano MD    ASSISTANT:  Gilberto Madrid. ANESTHESIA:  General.    COMPLICATIONS:  None. SPECIMENS REMOVED:  Bilateral breasts and left axillary sentinel lymph nodes x3. IMPLANTS:  None. ESTIMATED BLOOD LOSS:  Minimal.    INDICATIONS:  The patient is a 49-year-old female with a biopsy-proven adenocarcinoma of left breast who has opted for bilateral mastectomies without reconstruction. PROCEDURE:  After lymphoscintigraphy in Radiology, the patient was brought to the operating room. After satisfactory induction of general endotracheal anesthesia, she was prepped and draped in a sterile fashion. Attention turned to the right side first.  An elliptical incision was made around the breast.  It was deepened through subcutaneous tissue with Bovie cautery. Skin flaps raised superior to the clavicle, medial to the sternum, inferior to the inframammary fold, lateral to the latissimus dorsi muscle. Breast was removed off the chest wall subfascially maintaining hemostasis with the Bovie cautery. The lateral border of pectoralis major muscle, breast was mobilized and amputated at the level of axilla. All dissection planes were hemostatic. The wound was anesthetized with this combination of Marcaine, Exparel and saline and was closed with #19 round WILFREDO drain with an interrupted 3-0 Vicryl and running subcuticular 4-0 Monocryl on the skin. The medial dog ear was resected prior to this and closed in similar fashion.   Attention was then turned to the left side where an elliptical incision was made around the breast.  It was deepened through subcutaneous tissue with Bovie cautery. Skin flaps were raised superior to the clavicle, medial to the sternum, inferior to the inframammary fold, lateral to the latissimus dorsi muscle. Breast was removed off the chest wall subfascially maintaining hemostasis with the Bovie cautery. At the lateral border of pectoralis major muscle, breast was mobilized and amputated at the level of the axilla. All dissection planes were hemostatic. Specimens were oriented, sent to pathology. The axilla was entered and utilizing the Neoprobe, three sentinel nodes were found in the deep axilla. They were removed and sent for permanent section. Operation performed with curative intent: Yes  Tracer(s) used to identify sentinel nodes in the upfront surgery (nonneoadjuvant) setting (select all that apply) : Radioactive tracer  Tracer(s) used to identify sentinel nodes in the neoadjuvant setting (select all that apply): Not Applicable  All nodes (colored or noncolored) present at the end of a dye-filled lymphatic channel were removed: Not Applicable  All significantly radioactive nodes were removed: Yes  All palpably suspicious nodes were removed: Not Applicable  Biopsy-proven positive nodes marked with clips prior to chemotherapy were identified and removed: Not Applicable      All dissection planes were hemostatic. The wound was anesthetized with the same Exparel, Marcaine, saline solution and it was then closed with #19 WILFREDO drain with interrupted 3-0 Vicryl and running subcuticular 4-0 Monocryl on the skin. The patient tolerated the procedure well with no immediate complications. She was taken to recovery room in stable condition. Deja Covarrubias MD      [id number]  D:  11/29/2022 15:13  T:  11/30/2022 2:15  JOB #:  [id number]  CC:   MD Olivia Goodwin MD

## 2022-12-05 ENCOUNTER — TELEPHONE (OUTPATIENT)
Dept: SURGERY | Age: 62
End: 2022-12-05

## 2022-12-05 ENCOUNTER — DOCUMENTATION ONLY (OUTPATIENT)
Dept: SURGERY | Age: 62
End: 2022-12-05

## 2022-12-05 NOTE — PROGRESS NOTES
Mammaprint TRF faxed to [facility].. Confirmation fax received. Insurance letter mailed to patient.

## 2022-12-19 ENCOUNTER — TELEPHONE (OUTPATIENT)
Dept: SURGERY | Age: 62
End: 2022-12-19

## 2022-12-19 NOTE — TELEPHONE ENCOUNTER
Called with low risk mammaprint. The patient will call and make an appointment with Dr. Linda Thorne for a post op and to have her WILFREDO drains removed.

## 2022-12-21 ENCOUNTER — OFFICE VISIT (OUTPATIENT)
Dept: SURGERY | Age: 62
End: 2022-12-21
Payer: COMMERCIAL

## 2022-12-21 DIAGNOSIS — Z90.13 STATUS POST BILATERAL MASTECTOMY: Primary | ICD-10-CM

## 2022-12-21 DIAGNOSIS — L76.82 AXILLARY WEB SYNDROME: ICD-10-CM

## 2022-12-21 DIAGNOSIS — C50.912 INFILTRATING DUCTAL CARCINOMA OF LEFT BREAST (HCC): ICD-10-CM

## 2022-12-21 DIAGNOSIS — L90.5 AXILLARY WEB SYNDROME: ICD-10-CM

## 2022-12-21 PROCEDURE — 99024 POSTOP FOLLOW-UP VISIT: CPT | Performed by: SURGERY

## 2022-12-21 NOTE — PROGRESS NOTES
HISTORY OF PRESENT ILLNESS  Lucille Feliciano is a 57 y.o. female. Post OP Follow Up   ESTABLISHED patient here for POST OP visit for drain removal. Patient is doing well had some numbness. Patient denies pain. 10/31/2022: LEFT breast mass, ultrasound-guided biopsy. PATH: IDC with tubular features, 0.4cm in greatest dimension in a single core, grade 1, ER+(100%), NC+(100%), HER2-, Ki-67(5%). 11/29/2022: BILATERAL mastectomies and LEFT SNLbx. - LEFT breast simple mastectomy: IDC, 3 separate foci measuring 35, 8 and 7 mm in greatest dimension, Grade 1, negative margins, 1/5 lymph nodes with metastatic carcinoma. Pathologic stage: pT2, pN1a. Focal atypical ductal hyperplasia. Intraductal papilloma. -RIGHT breast simple mastectomy: Intraductal papilloma. Usual ductal hyperplasia. Fibroadenomatoid change. 11/29/2022: Mammaprint: LOW RISK, Luminal type A, +0.538. Review of Systems   All other systems reviewed and are negative.     Physical Exam    ASSESSMENT and PLAN  {ASSESSMENT/PLAN:[id number]}

## 2022-12-21 NOTE — PROGRESS NOTES
HISTORY OF PRESENT ILLNESS  Dio Mills is a 57 y.o. female. HPI  ESTABLISHED patient here for POST OP visit for drain removal. Patient is doing well had some numbness but denies pain. Review of Systems   All other systems reviewed and are negative. 10/31/2022: LEFT breast mass, ultrasound-guided biopsy. PATH: IDC with tubular features, 0.4cm in greatest dimension in a single core, grade 1, ER+(100%), OR+(100%), HER2-, Ki-67(5%). 11/29/2022: BILATERAL mastectomies and LEFT SNLbx. - LEFT breast simple mastectomy: IDC, 3 separate foci measuring 35, 8 and 7 mm in greatest dimension, Grade 1, negative margins, 1/5 lymph nodes with metastatic carcinoma. Pathologic stage: pT2, pN1a. Focal atypical ductal hyperplasia. Intraductal papilloma. -RIGHT breast simple mastectomy: Intraductal papilloma. Usual ductal hyperplasia. Fibroadenomatoid change. 11/29/2022: Mammaprint: LOW RISK, Luminal type A, +0.538. Past Medical History:   Diagnosis Date    Abnormal screening mammogram 10/10/2022    focal asymmetry in the left breast, slightly medial to the nipple line in the deep 3rd    Asthma     activity induced asthma     Autoimmune disease ([address]     mixed connective tissue disease    Breast cancer ([address] 10/2022    bilat mastectomy    Cancer ([address] 2013    skin-BCCA    Chronic pain     joints    DDD (degenerative disc disease), lumbar     Dense breast tissue on mammogram 10/02/2020    BI-RADS 1: Negative.     DJD (degenerative joint disease)     Environmental and seasonal allergies     ETOH abuse     Sober 12-13-11    Fibromyalgia     Gastric bleeding 2020    R/T NSAIDS    GERD (gastroesophageal reflux disease)     H/O diagnostic mammography 10/19/2022    BI-RADS Assessment Category 5: Highly suggestive of malignancy- Appropriate NEEDS BIOPSY LEFT BREAST    Hernia 2014    incisional hernia- repaired 2014    Hiatal hernia     Hx of bronchitis     related to allergies    Hx of mammogram 10/04/2021    Negative     Hx of sinusitis     related to allergies    Hyperlipemia     Hypertension     Hyponatremia 2011    Insomnia     Lupus [address]     Neuropathic pain     Osteopenia     Other bursitis of hip, right hip 09/14/2016    Had a deep hip injection for pain     Psychiatric disorder     anxiety    Routine Papanicolaou smear 09/20/2016    Negative (no hpv)     Seizures ([facility] 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     Sleep apnea     C-PAP    Stroke ([facility] 03/2011    PCP states she had a TIA - patient denies this (was low Na), pt denies this    Tobacco abuse     Stopped in 2012       Past Surgical History:   Procedure Laterality Date    HX BREAST BIOPSY Left 10/31/2022    HX CYST INCISION AND DRAINAGE Left 08/2016    shoulder    HX HERNIA REPAIR  10/07/2014    incisional with mesh    HX LAP CHOLECYSTECTOMY  03/11/2014    HX LUMBAR FUSION      10/8/2012    HX LUMBAR LAMINECTOMY      rods in 1996, out [address]  02/2017    HX MALIGNANT SKIN LESION EXCISION Right 2013    ear-BCCA    HX MASTECTOMY Bilateral 11/29/2022    BILATERAL BREAST TOTAL MASTECTOMIES, LEFT BREAST SENTINEL NODE BIOPSY performed by Elizabet Veras MD at [facility] Bilateral 2013 R, 2014 L    carpal tunnel    HX ORTHOPAEDIC  06/27/2013    right knee arthroscopy    HX ORTHOPAEDIC Right 03/2015    trigger thumb release    HX ORTHOPAEDIC  10/08/2012    ALIF/ PLIF with bone stimulator    HX ORTHOPAEDIC  10/23/2012    Back surgery to adjust hardware    HX ORTHOPAEDIC  01/2018    ALIF - fusion L2-L3    HX ORTHOPAEDIC  04/01/2019    L3-S1 hardware revision. T10 to pelvis PSIF. L1-L2 lami. L2-L3 lami revision.     HX ORTHOPAEDIC Left 06/2021    toe surgery    HX ORTHOPAEDIC Left 2020    thumb trigger release    HX OTHER SURGICAL  10/08/2012    [facility] Bone Growth Stimulator    HX TONSIL AND ADENOIDECTOMY  1962    HX TOTAL ABDOMINAL HYSTERECTOMY  12/20/1992    fibroids; Dr. Fidencio Ba History     Socioeconomic History    Marital status:      Spouse name: Not on file    Number of children: Not on file    Years of education: Not on file    Highest education level: Not on file   Occupational History    Not on file   Tobacco Use    Smoking status: Former     Packs/day: 2.00     Years: 32.00     Pack years: 64.00     Types: Cigarettes     Quit date: 7/1/2012     Years since quitting: 10.4    Smokeless tobacco: Never    Tobacco comments:     Never used vapor or e-cigs    Vaping Use    Vaping Use: Never used   Substance and Sexual Activity    Alcohol use: No     Alcohol/week: 0.0 standard drinks     Comment: Sober 11 years    Drug use: No    Sexual activity: Not on file   Other Topics Concern    Not on file   Social History Narrative    Not on file     Social Determinants of Health     Financial Resource Strain: Not on file   Food Insecurity: Not on file   Transportation Needs: Not on file   Physical Activity: Not on file   Stress: Not on file   Social Connections: Not on file   Intimate Partner Violence: Not on file   Housing Stability: Not on file       Current Outpatient Medications on File Prior to Visit   Medication Sig Dispense Refill    fluticasone propion-salmeteroL (ADVAIR/WIXELA) 250-50 mcg/dose diskus inhaler Take 1 Puff by inhalation two (2) times a day. ferrous sulfate 325 mg (65 mg iron) tablet Take 325 mg by mouth Daily (before breakfast). adalimumab (Humira,CF,) 40 mg/0.4 mL sykt 40 mg by SubCUTAneous route two (2) times a week. methotrexate (RHEUMATREX) 2.5 mg tablet Take 10 mg by mouth Every Friday. multivitamin (ONE A DAY) tablet Take 1 Tablet by mouth daily. calcium carbonate (CALTREX) 600 mg calcium (1,500 mg) tablet Take 600 mg by mouth two (2) times a day. COPPER PO Take 4 mg by mouth every evening. acetaminophen-codeine (TYLENOL #3) 300-30 mg per tablet Take 1 Tablet by mouth three (3) times daily as needed for Pain.       zinc 50 mg tab tablet Take 50 mg by mouth every evening. ALPRAZolam (XANAX) 0.5 mg tablet Take 1 Tablet by mouth three (3) times daily as needed for Anxiety (take as directed). Max Daily Amount: 1.5 mg. Indications: anxious 12 Tablet 0    clobetasoL (TEMOVATE) 0.05 % ointment Apply to affected area daily for 1 month, then every other day for 1 month, then continue 2x/wk for maintenance. 45 g 3    sertraline 200 mg cap Take 200 mg by mouth every evening. valsartan (DIOVAN) 320 mg tablet Take 320 mg by mouth Every morning. famotidine (PEPCID) 40 mg tablet Take 40 mg by mouth every evening. dexlansoprazole (DEXILANT) 60 mg CpDB capsule (delayed release) Take 1 Capsule by mouth Every morning. pregabalin (LYRICA) 225 mg capsule Take 225 mg by mouth two (2) times a day. albuterol (PROVENTIL HFA, VENTOLIN HFA, PROAIR HFA) 90 mcg/actuation inhaler Take 2 Puffs by inhalation every six (6) hours as needed for Wheezing. cyanocobalamin 1,000 mcg tablet Take 1,000 mcg by mouth daily. cycloSPORINE (RESTASIS) 0.05 % dpet Administer 1 Drop to both eyes two (2) times a day. folic acid (FOLVITE) 1 mg tablet Take 1 mg by mouth two (2) times a day. gemfibroziL (LOPID) 600 mg tablet Take 600 mg by mouth Before breakfast and dinner. hydrOXYchloroQUINE (PLAQUENIL) 200 mg tablet Take 200 mg by mouth two (2) times a day. cholecalciferol, vitamin D3, 50 mcg (2,000 unit) tab Take 2,000 Units by mouth daily. pyridoxine, vitamin B6, (VITAMIN B-6) 100 mg tablet Take 100 mg by mouth daily. No current facility-administered medications on file prior to visit. Allergies   Allergen Reactions    Nsaids (Non-Steroidal Anti-Inflammatory Drug) Other (comments)     Gastric bleeding    Penicillins Shortness of Breath and Rash     9/14/16 Reports able to take Keflex without problem.   Tolerated ancef in 12/12    Tramadol Shortness of Breath    Adhesive Tape-Silicones Rash       OB History    0    Para   0    Term   0            AB        Living             SAB        IAB        Ectopic        Molar        Multiple        Live Births   0          Obstetric Comments   Menarche:  4. LMP: 32.  # of Children:  0. Age at Delivery of First Child:  n/a. Hysterectomy/oophorectomy:  YES/NO. Breast Bx:  Yes left . Hx of Breast Feeding:  n/a. BCP:  no. Hormone therapy:  no.                ROS      Physical Exam  Exam conducted with a chaperone present. Constitutional:       Appearance: She is well-developed. She is not diaphoretic. HENT:      Head: Normocephalic and atraumatic. Right Ear: External ear normal.      Left Ear: External ear normal.   Eyes:      General: No scleral icterus. Right eye: No discharge. Left eye: No discharge. Pupils: Pupils are equal, round, and reactive to light. Neck:      Thyroid: No thyromegaly. Vascular: No JVD. Trachea: No tracheal deviation. Cardiovascular:      Rate and Rhythm: Normal rate and regular rhythm. Heart sounds: Normal heart sounds. Pulmonary:      Effort: Pulmonary effort is normal. No tachypnea, accessory muscle usage or respiratory distress. Breath sounds: Normal breath sounds. No stridor. Chest:   Breasts:     Breasts are symmetrical.      Right: No inverted nipple, mass, nipple discharge, skin change or tenderness. Left: No inverted nipple, mass, nipple discharge, skin change or tenderness. Abdominal:      General: There is no distension. Palpations: Abdomen is soft. There is no mass. Tenderness: There is no abdominal tenderness. Musculoskeletal:         General: Normal range of motion. Cervical back: Normal range of motion and neck supple. Lymphadenopathy:      Cervical: No cervical adenopathy. Skin:     General: Skin is warm and dry. Neurological:      Mental Status: She is alert and oriented to person, place, and time.    Psychiatric:         Speech: Speech normal.         Behavior: Behavior normal.         Thought Content: Thought content normal.         Judgment: Judgment normal.           ASSESSMENT and PLAN    ICD-10-CM ICD-9-CM    1. Status post bilateral mastectomy  Z90.13 V45.71       2. Infiltrating ductal carcinoma of left breast (HCC)  C50.912 174.9       3. Axillary web syndrome  L76.82 998.89     L90.5 709.2         Patient presents for f/u s/p BILATERAL mastectomies on 11/29/2022, and is doing well overall. Upon examination noted normal and well healed incisions. Also noted patient has mild axillary web syndrome, advised she can do stretches and apply warm compresses to alleviate symptoms. If symptoms get worse will refer to physical therapy. Patient has not met with medical oncologist, will put referral in. Follow up in 6 months with Samara Howard. This plan was reviewed with the patient and patient agrees. All questions were answered.         Written by Justo Shah, as dictated by Dr. Yahaira Lima MD.

## 2022-12-30 ENCOUNTER — DOCUMENTATION ONLY (OUTPATIENT)
Dept: SURGERY | Age: 62
End: 2022-12-30

## 2023-01-18 ENCOUNTER — OFFICE VISIT (OUTPATIENT)
Dept: ONCOLOGY | Age: 63
End: 2023-01-18
Payer: COMMERCIAL

## 2023-01-18 VITALS
HEART RATE: 80 BPM | HEIGHT: 65 IN | TEMPERATURE: 97.8 F | BODY MASS INDEX: 32.51 KG/M2 | DIASTOLIC BLOOD PRESSURE: 80 MMHG | OXYGEN SATURATION: 98 % | WEIGHT: 195.1 LBS | SYSTOLIC BLOOD PRESSURE: 156 MMHG | RESPIRATION RATE: 15 BRPM

## 2023-01-18 DIAGNOSIS — C50.912 INFILTRATING DUCTAL CARCINOMA OF LEFT BREAST (HCC): Primary | ICD-10-CM

## 2023-01-18 RX ORDER — ANASTROZOLE 1 MG/1
1 TABLET ORAL DAILY
Qty: 90 TABLET | Refills: 3 | Status: SHIPPED | OUTPATIENT
Start: 2023-01-18

## 2023-01-18 NOTE — PROGRESS NOTES
[facility] at [address], [address] [place] [address] Road, [facility]: [phone]  F: [phone]    Reason for Visit:   Iona Smiley is a 57 y.o. female who is seen in consultation at the request of Dr. Adolfo Patiño for evaluation of breast cancer. Treatment History:   10/31/2022: LEFT breast mass, ultrasound-guided biopsy. PATH: IDC with tubular features, 0.4cm in greatest dimension in a single core, grade 1, ER+(100%), OK+(100%), HER2-, Ki-67(5%). 11/29/2022: BILATERAL mastectomies and LEFT SNLbx. - LEFT breast simple mastectomy: IDC, 3 separate foci measuring 35, 8 and 7 mm in greatest dimension, Grade 1, negative margins, 1/5 lymph nodes with metastatic carcinoma. Pathologic stage: pT2, pN1a. Focal atypical ductal hyperplasia. Intraductal papilloma. -RIGHT breast simple mastectomy: Intraductal papilloma. Usual ductal hyperplasia. Fibroadenomatoid change. 11/29/2022: Mammaprint: LOW RISK, Luminal type A, +[phone]. STAGE: PATHOLOGIC STAGE CLASSIFICATION (pTNM, AJCC 8th Edition)       TNM Descriptors: m (multiple foci of invasive carcinoma)       Primary Tumor (pT): pT2       Regional Lymph Nodes Modifier: (sn): [place] node(s) evaluated       Regional Lymph Nodes (pN): pN1a   Invasive carcinoma   ER          OK   Interpretation:     Positive     Interpretation:     Positive   Nuclear Staining %:     100%     Nuclear Staining %:     100%   Intensity:     Strong     Intensity:     Strong          HER-2/keren  Immunohistochemistry for Breast tumors   Results:     Negative, Score = 1+   Ki-67 Immunohistochemical Assay  Result:          5% nuclear staining in invasive carcinoma     History of Present Illness:     Pt seen today for office consult for new LEFT breast cancer post b/l mastectomy. Initially had an abnormal screening mammo on LEFT  Biopsy IDC. Breast MRI:  IMPRESSION  1. Left upper inner quadrant breast mass (9 x 12 mm).  BI-RADS 6.  2. Multiple satellite masses, and a segmental distribution, extending from  anterior to posterior third, over 62 mm. BI-RADS 4.  3. No lymphadenopathy. 4. No suspicious contralateral enhancement. 5. Overall assessment: BI-RADS Assessment Category 4: Suspicious abnormality. 6. Recommendation: MRI guided left breast biopsy could be performed if it would  . MP low risk  Had b/l mastectomy 11/22. Did well. Here to discuss adjuvant systemic therapy. Has lots of medical problems/ MCTD on meds. Feels fine today. No fevers/ chills/ chest pain/ SOB/ nausea/ vomiting/diarrhea/ pain/fatigue        Past Medical History:   Diagnosis Date    Abnormal screening mammogram 10/10/2022    focal asymmetry in the left breast, slightly medial to the nipple line in the deep 3rd    Asthma     activity induced asthma     Autoimmune disease ([address]     mixed connective tissue disease    Breast cancer ([address] 10/2022    bilat mastectomy    Cancer ([address] 2013    skin-BCCA    Chronic pain     joints    DDD (degenerative disc disease), lumbar     Dense breast tissue on mammogram 10/02/2020    BI-RADS 1: Negative.     DJD (degenerative joint disease)     Environmental and seasonal allergies     ETOH abuse     Sober 12-13-11    Fibromyalgia     Gastric bleeding 2020    R/T NSAIDS    GERD (gastroesophageal reflux disease)     H/O diagnostic mammography 10/19/2022    BI-RADS Assessment Category 5: Highly suggestive of malignancy- Appropriate NEEDS BIOPSY LEFT BREAST    Hernia 2014    incisional hernia- repaired 2014    Hiatal hernia     Hx of bronchitis     related to allergies    Hx of mammogram 10/04/2021    Negative     Hx of sinusitis     related to allergies    Hyperlipemia     Hypertension     Hyponatremia 2011    Insomnia     Lupus ([address]     Neuropathic pain     Osteopenia     Other bursitis of hip, right hip 09/14/2016    Had a deep hip injection for pain     Psychiatric disorder     anxiety    Routine Papanicolaou smear 09/20/2016    Negative (no hpv)     Seizures ([facility] 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     Sleep apnea     C-PAP    Stroke ([facility] 03/2011    PCP states she had a TIA - patient denies this (was low Na), pt denies this    Tobacco abuse     Stopped in 2012      Past Surgical History:   Procedure Laterality Date    HX BREAST BIOPSY Left 10/31/2022    HX CYST INCISION AND DRAINAGE Left 08/2016    shoulder    HX HERNIA REPAIR  10/07/2014    incisional with mesh    HX LAP CHOLECYSTECTOMY  03/11/2014    HX LUMBAR FUSION      10/8/2012    HX LUMBAR LAMINECTOMY      rods in 1996, out [address]  02/2017    HX MALIGNANT SKIN LESION EXCISION Right 2013    ear-BCCA    HX MASTECTOMY Bilateral 11/29/2022    BILATERAL BREAST TOTAL MASTECTOMIES, LEFT BREAST SENTINEL NODE BIOPSY performed by Kev Coley MD at [facility] 21 Bilateral 2013 R, 2014 L    carpal tunnel    HX ORTHOPAEDIC  06/27/2013    right knee arthroscopy    HX ORTHOPAEDIC Right 03/2015    trigger thumb release    HX ORTHOPAEDIC  10/08/2012    ALIF/ PLIF with bone stimulator    HX ORTHOPAEDIC  10/23/2012    Back surgery to adjust hardware    HX ORTHOPAEDIC  01/2018    ALIF - fusion L2-L3    HX ORTHOPAEDIC  04/01/2019    L3-S1 hardware revision. T10 to pelvis PSIF. L1-L2 lami. L2-L3 lami revision.     HX ORTHOPAEDIC Left 06/2021    toe surgery    HX ORTHOPAEDIC Left 2020    thumb trigger release    HX OTHER SURGICAL  10/08/2012    [facility] Bone Growth Stimulator    HX TONSIL AND ADENOIDECTOMY  1962    HX TOTAL ABDOMINAL HYSTERECTOMY  12/20/1992    fibroids; Dr. Valdez Kidney History     Tobacco Use    Smoking status: Former     Packs/day: 2.00     Years: 32.00     Pack years: 64.00     Types: Cigarettes     Quit date: 7/1/2012     Years since quitting: 10.5    Smokeless tobacco: Never    Tobacco comments:     Never used vapor or e-cigs    Substance Use Topics    Alcohol use: No     Alcohol/week: 0.0 standard drinks Comment: Sober 6 years      Family History   Problem Relation Age of Onset    Hypertension Mother     Stroke Mother         TIA    OSTEOARTHRITIS Father         RA    No Known Problems Sister     No Known Problems Brother     No Known Problems Sister     No Known Problems Sister      Current Outpatient Medications   Medication Sig    anastrozole (ARIMIDEX) 1 mg tablet Take 1 mg by mouth daily. fluticasone propion-salmeteroL (ADVAIR/WIXELA) 250-50 mcg/dose diskus inhaler Take 1 Puff by inhalation two (2) times a day. adalimumab (Humira,CF,) 40 mg/0.4 mL sykt 40 mg by SubCUTAneous route two (2) times a week. methotrexate (RHEUMATREX) 2.5 mg tablet Take 10 mg by mouth Every Friday. multivitamin (ONE A DAY) tablet Take 1 Tablet by mouth daily. calcium carbonate (CALTREX) 600 mg calcium (1,500 mg) tablet Take 600 mg by mouth two (2) times a day. COPPER PO Take 4 mg by mouth every evening. acetaminophen-codeine (TYLENOL #3) 300-30 mg per tablet Take 1 Tablet by mouth three (3) times daily as needed for Pain. zinc 50 mg tab tablet Take 50 mg by mouth every evening. ALPRAZolam (XANAX) 0.5 mg tablet Take 1 Tablet by mouth three (3) times daily as needed for Anxiety (take as directed). Max Daily Amount: 1.5 mg. Indications: anxious    sertraline 200 mg cap Take 200 mg by mouth every evening. valsartan (DIOVAN) 320 mg tablet Take 320 mg by mouth Every morning. famotidine (PEPCID) 40 mg tablet Take 40 mg by mouth every evening. dexlansoprazole (DEXILANT) 60 mg CpDB capsule (delayed release) Take 1 Capsule by mouth Every morning. pregabalin (LYRICA) 225 mg capsule Take 225 mg by mouth two (2) times a day. albuterol (PROVENTIL HFA, VENTOLIN HFA, PROAIR HFA) 90 mcg/actuation inhaler Take 2 Puffs by inhalation every six (6) hours as needed for Wheezing. cyanocobalamin 1,000 mcg tablet Take 1,000 mcg by mouth daily.     cycloSPORINE (RESTASIS) 0.05 % dpet Administer 1 Drop to both eyes two (2) times a day. folic acid (FOLVITE) 1 mg tablet Take 1 mg by mouth two (2) times a day. gemfibroziL (LOPID) 600 mg tablet Take 600 mg by mouth Before breakfast and dinner. hydrOXYchloroQUINE (PLAQUENIL) 200 mg tablet Take 200 mg by mouth two (2) times a day. cholecalciferol, vitamin D3, 50 mcg (2,000 unit) tab Take 2,000 Units by mouth daily. pyridoxine, vitamin B6, (VITAMIN B-6) 100 mg tablet Take 100 mg by mouth daily. ferrous sulfate 325 mg (65 mg iron) tablet Take 325 mg by mouth Daily (before breakfast). clobetasoL (TEMOVATE) 0.05 % ointment Apply to affected area daily for 1 month, then every other day for 1 month, then continue 2x/wk for maintenance. (Patient not taking: Reported on 1/18/2023)     No current facility-administered medications for this visit. Allergies   Allergen Reactions    Nsaids (Non-Steroidal Anti-Inflammatory Drug) Other (comments)     Gastric bleeding    Penicillins Shortness of Breath and Rash     9/14/16 Reports able to take Keflex without problem. Tolerated ancef in 12/12    Tramadol Shortness of Breath    Adhesive Tape-Silicones Rash        A complete review of systems was obtained, negative except as described above and as reported on ROS sheet scanned into system. Physical Exam:   Visit Vitals  BP (!) 156/80 (BP 1 Location: Left arm, BP Patient Position: Sitting, BP Cuff Size: Adult)   Pulse 80   Temp 97.8 °F (36.6 °C)   Resp 15   Ht 5' 5\" (1.651 m)   Wt 195 lb 1.6 oz (88.5 kg)   SpO2 98%   BMI 32.47 kg/m²     ECOG PS: 0  General: No distress  Eyes: PERRLA, anicteric sclerae  HENT: Atraumatic, wearing mask  Neck: Supple  Respiratory: CTAB, normal respiratory effort  CV: Normal rate, regular rhythm, no murmurs, no peripheral edema  GI: Soft, nontender, nondistended, no masses  MS: Normal gait and station. Digits without clubbing or cyanosis. Skin: No rashes, ecchymoses, or petechiae. Normal temperature, turgor, and texture.   Psych: Alert, oriented, appropriate affect, normal judgment/insight  Neuro non focal  Breast b/l mastectomy with healed scars, no lesions/ masses    Results:     Lab Results   Component Value Date/Time    WBC 5.7 11/22/2022 11:05 AM    HGB 13.0 11/22/2022 11:05 AM    HCT 38.9 11/22/2022 11:05 AM    PLATELET 414 87/82/3145 11:05 AM    MCV 95.6 11/22/2022 11:05 AM    ABS. NEUTROPHILS 2.6 11/22/2022 11:05 AM    Hemoglobin (POC) 13.9 12/21/2015 09:39 AM    Hematocrit (POC) 41 12/21/2015 09:39 AM     Lab Results   Component Value Date/Time    Sodium 137 11/22/2022 11:05 AM    Potassium 4.6 11/22/2022 11:05 AM    Chloride 106 11/22/2022 11:05 AM    CO2 27 11/22/2022 11:05 AM    Glucose 91 11/22/2022 11:05 AM    BUN 20 11/22/2022 11:05 AM    Creatinine 1.00 11/22/2022 11:05 AM    GFR est AA >60 12/21/2020 06:00 PM    GFR est non-AA >60 12/21/2020 06:00 PM    Calcium 8.7 11/22/2022 11:05 AM    Sodium (POC) 135 (L) 12/21/2015 09:39 AM    Potassium (POC) 4.4 12/21/2015 09:39 AM    Chloride (POC) 100 12/21/2015 09:39 AM    Glucose (POC) 95 12/21/2015 09:39 AM    BUN (POC) 6 (L) 12/21/2015 09:39 AM    Creatinine (POC) 0.80 11/10/2022 08:39 AM    Calcium, ionized (POC) 1.13 12/21/2015 09:39 AM     Lab Results   Component Value Date/Time    Bilirubin, total 0.4 12/21/2020 06:00 PM    ALT (SGPT) 33 12/21/2020 06:00 PM    Alk. phosphatase 74 12/21/2020 06:00 PM    Protein, total 7.0 12/21/2020 06:00 PM    Albumin 4.0 12/21/2020 06:00 PM    Globulin 3.0 12/21/2020 06:00 PM     MRI Results (most recent):  Results from [place] encounter on 11/10/22    MRI BREAST BI W WO CONT    Narrative  INDICATION: Left breast carcinoma    COMPARISON: Multiple prior breast imaging studies from October 2022. TECHNIQUE:  Multisequence, multiplanar, bilateral breast MRI was performed in prone position  using a dedicated breast coil. Images were obtained without contrast and dynamic  postcontrast images were obtained in multiple phases.  9 mL IV Gadavist was  administered. Subtraction images were reconstructed. Postcontrast images were  reviewed with dedicated kinetic analysis software. FINDINGS:  There is mild background parenchymal enhancement and heterogeneous  fibroglandular tissue. The spiculated mass at 10:00 in the middle to posterior  third of the left breast measures 9 x 12 x 12 mm. A biopsy clip lies immediately  anterior to it. Multiple, subcentimeter, satellite masses in a segmental/ray  distribution extend over 62 mm anteroinferiorly in the upper inner quadrant. The  anteroinferior most mass is at the level of the nipple line. No axillary or  internal mammary chain lymphadenopathy. No suspicious contralateral enhancement. No right lymphadenopathy. A summary portfolio with key images has been sent from kinetic analysis software  to PACS. Impression  1. Left upper inner quadrant breast mass (9 x 12 mm). BI-RADS 6.  2. Multiple satellite masses, and a segmental distribution, extending from  anterior to posterior third, over 62 mm. BI-RADS 4.  3. No lymphadenopathy. 4. No suspicious contralateral enhancement. 5. Overall assessment: BI-RADS Assessment Category 4: Suspicious abnormality. 6. Recommendation: MRI guided left breast biopsy could be performed if it would  . Records reviewed and summarized above. Pathology report(s) reviewed above. Radiology report(s) reviewed above.       Assessment:/PLAN     1)  LEFT breast cancer dx 10/22 post b/l mastectomy 11/22  PATHOLOGIC STAGE CLASSIFICATION (pTNM, AJCC 8th Edition)       TNM Descriptors: m (multiple foci of invasive carcinoma)       Primary Tumor (pT): pT2       Regional Lymph Nodes Modifier: (sn): [place] node(s) evaluated       Regional Lymph Nodes (pN): pN1a   Invasive carcinoma   ER          [place]   Interpretation:     Positive     Interpretation:     Positive   Nuclear Staining %:     100%     Nuclear Staining %:     100%   Intensity:     Strong     Intensity: Strong          HER-2/keren  Immunohistochemistry for Breast tumors   Results:     Negative, Score = 1+   Ki-67 Immunohistochemical Assay  Result:          5% nuclear staining in invasive carcinoma     Records and history reviewed with pt today. Reviewed path and data specifically with pt. Discussed dx, stage and treatment of breast cancer. Reviewed low risk MP today. Discussed adjuvant chemo and hormonal therapy options. Does not want/ need chemo. Does not want to see Rad/onc. Discussed adjuvant hormonal therapy today with AI / tamoxifen. The risks and benefits of aromatase inhibitors (anastrozole, letrozole, and exemestane) were discussed in detail and the patient was informed of the following: Risks include the development of painful muscles and joints (arthralgia/myalgia) and bone loss. Muscle and joint pain can be severe but rarely result in any tissue damage; symptoms usually resolve in several weeks when the medication is stopped. Bone loss is common and a bone density test is recommended as a baseline and then yearly to every several years depending on initial results. The risk of fractures is increased by a few percent in patients taking these drugs, but careful monitoring of bone density and using bone protecting agents when indicated can minimize these risks. Unlike tamoxifen there is no increased risk of blood clots or endometrial cancer. AIs can cause or worsen vaginal dryness but women using these drugs should not use vaginal estrogen preparations for these symptoms. AIs can also cause or increase hot flashes. Any other symptoms should be reported. Pt is open to trying adjuvant AI. Rx anastrozole  Will start once she gets it. Pt clinically stable today. Feels well. Labs and routine HM with PCP/ Rheum. Fu in 3 months    2) MCTD/ Lupus. Per Rheum. 3) arthritis/ HTN/ sleep apnea/ uterine fibroids/ hx of colon polyps. Per PCP. 4) vaginal dryness on vaginal estrogen. Ok to use and pt will check with GYN also. 4) Psychosocial. Mood good. Coping well. Here with wife today. Has good support. Fu here in 3 months  Call if questions    I appreciate the opportunity to participate in Ms. Cely Sousa's care.     Signed By: Tam Weaver,

## 2023-01-18 NOTE — PATIENT INSTRUCTIONS
Anastrozole (Arimidex®)   This information is about a hormonal therapy used to treat breast cancer called anastrozole, which is also called Arimidex®. Throughout this page we refer to it by its more commonly used name, Arimidex. This information should ideally be read with our general information about breast cancer or secondary breast cancer. Arimidex   Arimidex is a type of hormonal therapy used to treat breast cancer in women who have been through the menopause (change of life). You will see your doctor regularly while you have this treatment so they can monitor its effects. Hormonal therapies interfere with the production or action of particular hormones in the body. Hormones are substances produced naturally in the body. They act as chemical messengers and help control the activity of cells and organs. Aromatase inhibitors   Many breast cancers rely on the hormone oestrogen to grow. These cancers are known as hormone-sensitive breast cancers. In women who have had their menopause, the main source of oestrogen is through the conversion of androgens (sex hormones produced by the adrenal glands) into oestrogens. This is carried out by an enzyme called aromatase. The conversion process is known as aromatisation, and it happens mainly in the fatty tissues of the body. Arimidex is a drug that blocks the process of aromatisation, and so reduces the amount of oestrogen in the body. As less oestrogen reaches the cancer cells, they grow more slowly or stop growing altogether. Drugs that work in this way are known as aromatase inhibitors. Other aromatase inhibitors include letrozole (Femara®) and exemestane (Aromasin®). How Arimidex is taken   Arimidex is a tablet that is taken once a day. It should be swallowed whole with a glass of water, at about the same time each day. It doesn't matter whether this is in the morning or evening.    When it is given   Your doctor will take into account a number of different factors when planning your treatment. Arimidex is used to treat post-menopausal women with hormone-sensitive breast cancer. Early breast cancer   Arimidex may be given to women with early breast cancer (cancer that has not spread) after they have had surgery to remove the cancer. Giving treatment after surgery to reduce the chance of the cancer coming back is known as adjuvant therapy. For some women, Arimidex may be more effective than tamoxifen, and it has different side effects. Studies show that switching to Arimidex after taking tamoxifen for 2-3 years may be better than five years of tamoxifen for some women. Advanced breast cancer   Arimidex can be used to treat women who have breast cancer that has spread to other parts of the body (advanced or secondary breast cancer). It can also be used to treat women whose breast cancer has come back after initial treatment. You might find our information about staging and grading of breast cancer useful. Length of treatment   Your doctors will discuss the length of treatment they feel is appropriate for you. Arimidex may be given over a number of years, or for as long as it is controlling your cancer, depending on your individual situation. Possible side effects   Each person's reaction to any medicine is different. Most people have very few side effects with Arimidex, while others may experience more. The side effects described here won't affect everyone and may be different if you are taking more than one drug. We have outlined the most common side effects, but haven't included those that are rare and therefore unlikely to affect you. If you notice any effects which aren't listed here, discuss them with your doctor or nurse. You may have some of the following side effects, to varying degrees:   Hot flushes and sweats   These are usually mild and may wear off after a period of time.  Sometimes people find it helps to cut down on tea, coffee, nicotine and alcohol. Research suggests that hormones called progestogens or some types of antidepressants may be helpful in controlling this side effect. Your doctor or nurse can discuss this with you. Some people find complementary therapies such as acupuncture helpful. Your GP may be able to give you details about having these on the NHS. If you find your own therapist, make sure that they are properly qualified and registered. You can read more about treatments for menopausal symptoms like hot flushes in our information about managing menopausal symptoms. Vaginal dryness   This may occur while using Arimidex. Gels that can help to overcome the dryness are available. You can buy these from a  or your doctor can prescribe them. Feeling sick (nausea) and being sick (vomiting)   These side effects are rare and usually mild. They can usually be effectively treated, so let your doctor know if you are affected. Feeling sick can often be relieved by taking your tablet with food or at night. Diarrhoea   If you have diarrhoea it's important to drink plenty of fluids. Hair thinning   Some women notice that their hair becomes thinner while taking Arimidex. This is usually mild and the hair grows back at the end of treatment. Headaches   Some people have headaches while taking Arimidex, but this is not common. It's important to drink plenty of fluids. Let your doctor know if you are getting headaches, as they can prescribe medication to help. Skin rashes   Rarely, Arimidex can cause skin rashes. Vaginal bleeding   Some women have some vaginal bleeding, usually in the first few weeks of treatment. This is rare and usually occurs after changing from other hormonal therapies to treatment with Arimidex. If the bleeding continues, tell your doctor or breast care nurse. Joint pains/muscular stiffness   Some women have pain and stiffness in their joints while taking Arimidex.  Let your doctor know if these effects are a problem. You may find it helpful to take mild painkillers. Tiredness (fatigue) and lethargy   Some people can have increased tiredness, especially at the start of treatment. It's important to get plenty of rest. If you are very sleepy you should not drive or operate machinery. Risk of osteoporosis   Women who have, or are at risk of, osteoporosis (weakened bones), should have their bone strength assessed before and during treatment with Arimidex. Some women may need to take bone-strengthening drugs to help prevent osteoporosis developing. Always let your doctor or nurse know about any side effects you have. There are usually ways in which they can be controlled or improved. Things to remember about Arimidex tablets   Arimidex may interact with other medicines. Tell your doctor about any medicines you are taking, including non-prescribed drugs such as complementary therapies, vitamins and herbal drugs. Keep the tablets in a safe place, out of the reach of children. Keep the tablets in the original packaging and store them at room temperature, away from heat and direct sunlight. Don't worry if you forget to take your tablet. Do not take a double dose. The levels of the drug in your blood will not change very much, but try not to miss more than one or two tablets in a row. If your doctor decides to stop the treatment, return any remaining tablets to the pharmacist. Don't flush them down the toilet or throw them away. Remember to get a new prescription a few weeks before you run out of tablets, and make sure that you have plenty for holidays. References   This information is based on our Anastrozole (Arimidex®) fact sheet and has been compiled using information from a number of reliable sources, including:   sergio Fields. [place]: The Complete Drug Reference. 36th edition. 2009. [employer]. [employer]. 59th edition. 2010.  [employer] and [address]. Early and locally advanced breast cancer: diagnosis and treatment. February 2009. National institute for Health and Clinical Excellence (NICE). electronic Medicines Compendium [facility]). www.medicines. org.uk (accessed September 2010).

## 2023-01-18 NOTE — PROGRESS NOTES
Verified Name and  of the patient. Chief Complaint   Patient presents with    New Patient       Health maintenance will be addressed with Primary Care Provider at next visit. Vitals:    23 1339 23 1342   BP: (!) 160/81 (!) 156/80   Pulse: 80    Resp: 15    Temp: 97.8 °F (36.6 °C)    SpO2: 98%    Weight: 195 lb 1.6 oz (88.5 kg)    Height: 5' 5\" (1.651 m)    PainSc:   0 - No pain        1. Have you been to the ER, urgent care clinic since your last visit? Hospitalized since your last visit? No    2. Have you seen or consulted any other health care providers outside of the [address] [place] [address] since your last visit? Include any pap smears or colon screening.  No

## 2023-01-18 NOTE — LETTER
1/18/2023    Patient: David Wilkins   YOB: 1960   Date of Visit: 1/18/2023     Darcy Wilson MD  [facility] [address] [phone]  Via Fax: [phone]    Dear Darcy Wilson MD,      Thank you for referring Ms. Roberto Benjamin to [address] [place] [address] for evaluation. My notes for this consultation are attached. If you have questions, please do not hesitate to call me. I look forward to following your patient along with you.       Sincerely,    Piage Villa, DO

## 2023-01-23 ENCOUNTER — DOCUMENTATION ONLY (OUTPATIENT)
Dept: ONCOLOGY | Age: 63
End: 2023-01-23

## 2023-01-23 ENCOUNTER — TELEPHONE (OUTPATIENT)
Dept: ONCOLOGY | Age: 63
End: 2023-01-23

## 2023-01-23 NOTE — PROGRESS NOTES
Oncology Social Work  Psychosocial Assessment    Reason for Assessment:      [] Social Work Referral [x] Initial Assessment [x] New Diagnosis [] Other    [facility] [address]   Patient's Arlenn is: -   Confirm Advance Directive Yes, on file       Sources of Information:    [x]Patient  []Family  [x]Staff  [x]Medical Record    Mental Status:    [x]Alert  []Lethargic  []Unresponsive   [] Unable to assess   Oriented to:  [x]Person  [x]Place  [x]Time  [x]Situation      Relationship Status:  []Single  [x]  []Significant Other/Life Partner  []  []  []    Living Circumstances:  []Lives Alone  [x]Family/Significant Other in Household  []Roommates  []Children in the Home  []Paid Caregivers  []Assisted Living Facility/Group [address]  []Homeless  []Incarcerated  []Environmental/Care Concerns  []Other:    Employment Status:  []Employed Full-time []Employed Part-time []Homemaker  [x] Retired [] Short-Term Disability [] Long-Term Disability  [] Unemployed   [] [place]   [] SSDI  [] Self-Employment    Barriers to Learning:    []Language  []Developmental  []Cognitive  []Altered Mental Status  []Visual/Hearing Impairment  []Unable to Read/Write  []Motivational  [] Challenges Understanding Medical Jargon [x]No Barriers Identified      Financial/Legal Concerns:    []Uninsured  [x]Limited Income/Resources  []Non-Citizen  []Food Insecurity [x]No Concerns Identified   []Other:    Taoism/Spiritual/Existential:  Does patient have any spiritual or Yazidism beliefs? [x] Yes [] No  Is the patient involved in a spiritual, sukhdev or Yazidism community?  [] Yes [x] No  Patient expressing spiritual/existential angst? [] Yes [x] No  Notes:    Support System:    Identified Support Person/Group:  []Strong  [x]Fair  []Limited    Coping with Illness:   [x]  Coping Well  [] Challenges Coping with Serious Illness [] Situational Depression [] Situational Anxiety [] Anticipatory Grief  [] Recent Loss [] Caregiver Holden            Narrative:    Spoke with patient  over phone on Jan 23 to introduce social work navigator role and supports. Pt  to Sagrario [place] [address] for 28 years and they have 1 daughter. PT retired from [employer] career with [employer]. Pt has grandchildren now and enjoy babysitting for their grandkids. Pt seems to think she will not need radiation and will only have to take a pill. No concerns voiced and pt took SW # down if needing any support in the future. Plan:   Introduce self and role of the  [facility] the [facility] [address] Informed the patient of the [facility] and available resources there. Continue to meet with the patient when she returns to the clinic for ongoing assessment of the patient's adjustment to her diagnosis and treatment. Ongoing psychosocial support as desired by patient. Referral/Handouts:       Complementary therapies referral  Insurance/Entitlements referral  Financial/Medication assistance referral       Alejandra Ellis.  VICKI Young/PURNIMA  Supervisee in Social Work

## 2023-01-26 ENCOUNTER — HOSPITAL ENCOUNTER (OUTPATIENT)
Dept: PHYSICAL THERAPY | Age: 63
Discharge: HOME | End: 2023-01-26
Payer: COMMERCIAL

## 2023-01-26 ENCOUNTER — DOCUMENTATION ONLY (OUTPATIENT)
Dept: SURGERY | Age: 63
End: 2023-01-26

## 2023-01-26 PROCEDURE — 97161 PT EVAL LOW COMPLEX 20 MIN: CPT

## 2023-01-26 PROCEDURE — 97140 MANUAL THERAPY 1/> REGIONS: CPT

## 2023-01-26 PROCEDURE — 97110 THERAPEUTIC EXERCISES: CPT

## 2023-01-26 PROCEDURE — 97760 ORTHOTIC MGMT&TRAING 1ST ENC: CPT

## 2023-01-26 NOTE — THERAPY EVALUATION
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 []   I87.2 Venous Insufficiency   [x]   I89.0 Lymphedema, other secondary  []   I83.019 Venous Stasis Ulcer LE, Right   []   I89.9 Unspecified Lymphatic Disorder []   I83.029 Venous Stasis Ulcer LE, Left   [x]   R60.9 Swelling not relieved by elevation []   Q82.0 Hereditary/ Congenital Lymphedema   [x]   C50.919 Malignant neoplasm of breast with lumpectomy or mastectomy []   G89.3  Cancer associated pain   [x]   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 []   L03.115 LE Cellulitis, Right   []    []   L03.116 LE Cellulitis, Left                                                           Angel Decker Lars    1960  Page 2 of 2    Physician Order for DME for Diagnosis of  as Listed on Statement of Medical Necessity, Page 1        Recommended Product:  Units Upper Extremity Rt Lt Units Lower Extremity Rt Lt    Circ-Aid, Ready Wrap, Sigvaris Arm    Inelastic binders (Circ-Aid, Farrow)  []Foot   []Below Knee   []Knee   []Thigh      Circ-Aid Ready Wrap, Sigvaris hand    Edilberto Jonathon, night use []Full Leg  []Lower Leg     1 Tribute Arm, night use    Tribute, night use  []Full Leg  []Lower Leg      Edilberto Jonathon Arm, night use    Calvin Sleeve Leg/ Foot, night use     2 Gradiant Compression Sleeves & Gloves  []Custom [x] RM Arm:  []CCL1 [x]CCL2 []CCL3  []Custom [x] RM Gauntlet: []CCL1 [x]CCL2 []CCL3    x  Gradient Compression Stockings   []Custom  []RM Lower Extremity:   []CCL1       []CCL2         []CCL3   []Knee       []Thigh        []Waist Length      Calvin sleeve arm w/ hand, night use    Tribute Wrap, night use      Compression Bra          Compression Vest         The above patient was referred for treatment of Lymphedema due to the diagnosis indicated above.   Lymphedema is a progressive and chronic condition. It may cause acute infections, muscle atrophy, discomfort, and connective tissue fibrosis with irreversible tissue damage, decreased ROM in the affected limb and diminished functional mobility possibly interfering with independence and ability to work. Recurrent infections and wound complications that commonly occur with Lymphedema often require hospitalization and extensive wound care, thus increasing medical costs. The patient has received complete decongestive therapy which includes manual lymphatic drainage, lymphedema specific exercises, compression bandaging, and hygiene/skin care. Goals of therapy are to reduce the edema and prevent re-accumulation of fluid with its complications. It is medically necessary for this patient to have daytime garments to control this condition. They will need 2 sets (one set to wear and one set to wash for adequate skin care and wearing time). Garments must be replaced every 4-6 months for effectiveness. There are no substitutes available that offer acceptable compression treatment for this Lymphedema patient. If further information is requested, please contact our certified lymphedema therapists at [phone].     [] Craig Gamez, PT, DPT, CLT-SURAJ  [] Mir Goldstein, PT, CLT-SURAJ  [] Minesh Ambrocio, PT, DPT, CLT-SURAJ  [x] Gretel Hensley, PT, DPT, [address]    Printed  Provider Name Kathleen Harp MD Provider Signature  Date    Provider NPI [id number]

## 2023-01-26 NOTE — THERAPY EVALUATION
[address]  [facility]  [address] [facility]  [address]  [address]      INITIAL EVALUATION    NAME: Nisa Sousa AGE: 57 y.o. GENDER: female  DATE: 1/26/2023  REFERRING PHYSICIAN: Brian Moeller MD  HISTORY AND BACKGROUND: Patient is a 58 y/o female presenting to outpatient lymphedema clinic with reports of L forearm, and wrist swelling. She initially noticed the swelling when her L arm skin felt tight, and began to notice a visual change in the size over a couple days. She notes it sometimes is worse when she wakes up, and does not usually fluctuate much. She also notes increased difficulty with reaching behind her back, and some tightness of the L axilla and along the inside of her arm when reaching up causing discomfort with performing ADLs like dressing. She notes increased pain as well along the medial aspect of the forearm which she associates with the swelling as they started around the same time. 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 Pain in arm and wrist with increased swelling when participating in recreational activities including fishing, gardening, and woodworking. Lymphedema Life Impact Scale (LLIS): 34/68 50%.   Past Medical History:   Past Medical History:   Diagnosis Date    Abnormal screening mammogram 10/10/2022    focal asymmetry in the left breast, slightly medial to the nipple line in the deep 3rd    Asthma     activity induced asthma     Autoimmune disease ([address]     mixed connective tissue disease    Breast cancer ([facility] 10/2022    bilat mastectomy    Cancer ([facility] 2013    skin-BCCA    Chronic pain     joints    DDD (degenerative disc disease), lumbar     Dense breast tissue on mammogram 10/02/2020    BI-RADS 1: Negative.     DJD (degenerative joint disease)     Environmental and seasonal allergies     ETOH abuse     Sober 12-13-11    Fibromyalgia     Gastric bleeding 2020    R/T NSAIDS    GERD (gastroesophageal reflux disease)     H/O diagnostic mammography 10/19/2022    BI-RADS Assessment Category 5: Highly suggestive of malignancy- Appropriate NEEDS BIOPSY LEFT BREAST    Hernia 2014    incisional hernia- repaired 2014    Hiatal hernia     Hx of bronchitis     related to allergies    Hx of mammogram 10/04/2021    Negative     Hx of sinusitis     related to allergies    Hyperlipemia     Hypertension     Hyponatremia 2011    Insomnia     Lupus ([facility]     Neuropathic pain     Osteopenia     Other bursitis of hip, right hip 09/14/2016    Had a deep hip injection for pain     Psychiatric disorder     anxiety    Routine Papanicolaou smear 09/20/2016    Negative (no hpv)     Seizures ([facility] 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     Sleep apnea     C-PAP    Stroke ([facility] 03/2011    PCP states she had a TIA - patient denies this (was low Na), pt denies this    Tobacco abuse     Stopped in 2012     Past Surgical History:   Procedure Laterality Date    HX BREAST BIOPSY Left 10/31/2022    HX CYST INCISION AND DRAINAGE Left 08/2016    shoulder    HX HERNIA REPAIR  10/07/2014    incisional with mesh    HX LAP CHOLECYSTECTOMY  03/11/2014    HX LUMBAR FUSION      10/8/2012    HX LUMBAR LAMINECTOMY      rods in 1996, out [facility]  02/2017    HX MALIGNANT SKIN LESION EXCISION Right 2013    ear-BCCA    HX MASTECTOMY Bilateral 11/29/2022    BILATERAL BREAST TOTAL MASTECTOMIES, LEFT BREAST SENTINEL NODE BIOPSY performed by Jani Vieyra MD at [facility]    HX ORTHOPAEDIC Bilateral 2013 R, 2014 L    carpal tunnel    HX ORTHOPAEDIC  06/27/2013    right knee arthroscopy    HX ORTHOPAEDIC Right 03/2015    trigger thumb release    HX ORTHOPAEDIC  10/08/2012    ALIF/ PLIF with bone stimulator    HX ORTHOPAEDIC  10/23/2012    Back surgery to adjust hardware    HX ORTHOPAEDIC  01/2018    ALIF - fusion L2-L3    HX ORTHOPAEDIC  04/01/2019    L3-S1 hardware revision. T10 to pelvis PSIF. L1-L2 lami. L2-L3 lami revision. HX ORTHOPAEDIC Left 06/2021    toe surgery    HX ORTHOPAEDIC Left 2020    thumb trigger release    HX OTHER SURGICAL  10/08/2012    [facility] Bone Growth Stimulator    HX TONSIL AND ADENOIDECTOMY  1962    HX TOTAL ABDOMINAL HYSTERECTOMY  12/20/1992    fibroids; Dr. Aruna Roberts     Current Medications:    Current Outpatient Medications   Medication Sig    anastrozole (ARIMIDEX) 1 mg tablet Take 1 mg by mouth daily. fluticasone propion-salmeteroL (ADVAIR/WIXELA) 250-50 mcg/dose diskus inhaler Take 1 Puff by inhalation two (2) times a day. ferrous sulfate 325 mg (65 mg iron) tablet Take 325 mg by mouth Daily (before breakfast). adalimumab (Humira,CF,) 40 mg/0.4 mL sykt 40 mg by SubCUTAneous route two (2) times a week. methotrexate (RHEUMATREX) 2.5 mg tablet Take 10 mg by mouth Every Friday. multivitamin (ONE A DAY) tablet Take 1 Tablet by mouth daily. calcium carbonate (CALTREX) 600 mg calcium (1,500 mg) tablet Take 600 mg by mouth two (2) times a day. COPPER PO Take 4 mg by mouth every evening. acetaminophen-codeine (TYLENOL #3) 300-30 mg per tablet Take 1 Tablet by mouth three (3) times daily as needed for Pain. zinc 50 mg tab tablet Take 50 mg by mouth every evening. ALPRAZolam (XANAX) 0.5 mg tablet Take 1 Tablet by mouth three (3) times daily as needed for Anxiety (take as directed). Max Daily Amount: 1.5 mg.  Indications: anxious    clobetasoL (TEMOVATE) 0.05 % ointment Apply to affected area daily for 1 month, then every other day for 1 month, then continue 2x/wk for maintenance. (Patient not taking: Reported on 1/18/2023)    sertraline 200 mg cap Take 200 mg by mouth every evening. valsartan (DIOVAN) 320 mg tablet Take 320 mg by mouth Every morning. famotidine (PEPCID) 40 mg tablet Take 40 mg by mouth every evening. dexlansoprazole (DEXILANT) 60 mg CpDB capsule (delayed release) Take 1 Capsule by mouth Every morning. pregabalin (LYRICA) 225 mg capsule Take 225 mg by mouth two (2) times a day. albuterol (PROVENTIL HFA, VENTOLIN HFA, PROAIR HFA) 90 mcg/actuation inhaler Take 2 Puffs by inhalation every six (6) hours as needed for Wheezing. cyanocobalamin 1,000 mcg tablet Take 1,000 mcg by mouth daily. cycloSPORINE (RESTASIS) 0.05 % dpet Administer 1 Drop to both eyes two (2) times a day. folic acid (FOLVITE) 1 mg tablet Take 1 mg by mouth two (2) times a day. gemfibroziL (LOPID) 600 mg tablet Take 600 mg by mouth Before breakfast and dinner. hydrOXYchloroQUINE (PLAQUENIL) 200 mg tablet Take 200 mg by mouth two (2) times a day. cholecalciferol, vitamin D3, 50 mcg (2,000 unit) tab Take 2,000 Units by mouth daily. pyridoxine, vitamin B6, (VITAMIN B-6) 100 mg tablet Take 100 mg by mouth daily. No current facility-administered medications for this encounter. Allergies: Allergies   Allergen Reactions    Nsaids (Non-Steroidal Anti-Inflammatory Drug) Other (comments)     Gastric bleeding    Penicillins Shortness of Breath and Rash     9/14/16 Reports able to take Keflex without problem.   Tolerated ancef in 12/12    Tramadol Shortness of Breath    Adhesive Tape-Silicones Rash        Prior Level of Function/Social/Work History/Personal factors and/or comorbidities impacting plan of care: Patient retired .  Living Situation: Lives with spouse   Trainable Caregiver?: Yes  Mobility: Independent gait without any assistive device for community distances  Sleeping Arrangement:  in bed at night  Adaptive Equipment Owned: cane: straight and walker: rolling  Other: Patient is able to don compression stockings and does have assistance spouse. Community Resources Addressed (if applicable): Yes    Previous Therapy:  None for this condition. Compression/Lymphedema Equipment: none    SUBJECTIVE:   Patients goals for therapy: decrease swelling and to be measured for new compression garments. OBJECTIVE DATA SUMMARY:   EXAMINATION/PRESENTATION/DECISION MAKING:   Pain: 2/10 arm and wrist currently       Self-Care and ADLs: independent but experiencing increasing difficulty    Skin and Tissue Assessment:  Dermal Status: Dry and Scars    Texture/Consistency: Boggy  and Pitting Edema    Pigmentation/Color Change: Hyperpigmented    Anomalies: N/A    Circulatory: Pulses    Nails: Normal    Stemmers Sign: Negative    Height:     Weight:      BMI:     (36 or greater: adversely affecting lymphedema)  Wound/Ulcer:  none present         Volumetric Measurements:   Right:  3118. 99 mL Left:  3389.26 mL   % Difference: 8.67 Dominance: L   (See scanned graph)  Range of Motion: [facility] extremities, except UE: LUE Epley IR L5, RUE T7    Strength: WNL     Strength   (90 degree elbow flexion, 0 degree shoulder flexion) Average of 3 trials (lbs)   Right 63.3 lbs   Left 56.6 lbs       Sensation:  Impaired Altered below the elbow of the LUE with medial parasthesias present but patient reports are transient, RUE intact    Mobility:    Bed/Chair Mobility: Independent  Transfers: Independent  Gait: Independent  Endurance: intact  Other:     Safety:  Patient is alert and oriented: x4  Safety awareness: intact  Fall risk?: Yes, reporting recent about 2 weeks ago.  Will require further assessment with potential referral.  Patient given written fall prevention handout: Yes       Physical Therapy Evaluation Charge Determination   History Examination Presentation Decision-Making   HIGH Complexity :3+ comorbidities / personal factors will impact the outcome/ POC  MEDIUM Complexity : 3 Standardized tests and measures addressing body structure, function, activity limitation and / or participation in recreation  LOW Complexity : Stable, uncomplicated  Other outcome measures LLIS,  strength, ROM, volumetric measurements  LOW       Based on the above components, the patient evaluation is determined to be of the following complexity level: LOW     Evaluation Time: 4519-8541 20 mins    TREATMENT PROVIDED:   1. Treatment description:  Therapeutic Exercise and Procedure: Patient instructed in activity restrictions and exercise guidelines. Therapist demonstrated deep abdominal breathing and a remedial lymphedema range of motion exercise program to be done in sitting. Patient was able to complete the routine times 5 reps and deep abdominal breathing with good performance. Patient has been asked to complete breathing exercises and the decongestive exercise routine daily. Patient does participate in either a walking or other exercise program almost daily. Treatment time:  5273-9633  Minutes: 15    2. Treatment description:  Manual Therapy: Patient instructed in skin care principles and anatomy of the lymphatic system. Therapist able to demonstrate manual lymphatic drainage techniques for the drainage of LUE and skin care protocol: eucerin dailly. Standard lymphedema precautions to include avoiding blood pressure readings, injections and IVs or other procedures/acts that could lead to broken skin on affected area, and avoiding excessive heat, resistive activity or altitude without compression garment. Treatment time:  4516-3852  Minutes: 12    Pain Level: 1-2/10    3.  Treatment description: Orthotic Assessment Measurement  Upper/Lower Extremity Compression: Fitted for and Measured for the following compression products:    UE: left palm wrist elbow mid forearm axilla left upper extremity: Arm sleeve and Gauntlet    Style: Circular knit    Brand: George    Type: Non-Custom: Size: 2001 size IV Max Regular with silicone band class II, Gauntlet size L class II     Vendor: [employer]    Education: Educated patient in compression garment donning and doffing. Glove use with donning. Daily wear schedule. Daily laundering. Garment lifespan. Return and reordering process (by bringing prior garments into the clinic). Educated patient to monitor for redness or pressure points on the skin. If new pain occurs they should contact their therapist.    Patient/family demonstrated donning and doffing with independence    HOME PROGRAM:  Compression Arm Sleeve Instructions:  Apply the sleeve and glove on a clean, dry arm first thing in the morning. Wear all day until you go to bed. Adjust the sleeve during the day as needed with rubber gloves to protect the fabric, watching area at the wrist and bend in the elbow, removing any creases in the sleeve that may occur with movement. Perform arm exercises as instructed with sleeve in place. Do not wear the sleeve without the hand piece for extended periods of time; it is ok to eat meals, wash dishes, or perform grooming activities without the glove in place, but remember to put it back on afterwards. Discontinue wearing your sleeve under the following conditions:  -numbness/tingling in the extremity   - Compromise in circulation: fingers feel cold   -onset of pain in the arm that is sudden and severe in nature  -Redness, warmth in the limb and/or fever, flu-like symptoms, which may indicate an infection. If this occurs, call your doctor right away and discontinue wearing the sleeve and gauntlet   -skin irritation or breakdown    Wash instructions: You need to wash and dry both your glove and your sleeve after each wear in order for this piece of medical equipment to offer prescribed compression to effectively manage your lymphedema.     Wash it in a garment bag or pillow case in cool water, gentle cycle, no bleach, no fabric softener, and no woolite. You may hand wash it if you prefer. Place it in the dryer on low heat to help restore the elasticity of the garments. ASSESSMENT:   Brent López is a 57 y.o. female who presents with stage II lymphedema of her UE: left palm wrist elbow mid forearm axilla. Also noted on exam presence fo L axillary cording likely contributing to sensory alteration of the LUE and painful response limiting ROM of the LUE compared to RUE. Patient is highly motivated to improve her condition and is seeking to expand her compression to include compression sleeve, gauntlet, and possible night time if needed. Patient's condition is complicated by L lymph node removal, current hormonal therapy, recent mastectomy B. Patient will benefit from complete decongestive therapy (CDT) including: Manual lymphatic drainage (MLD) technique, Short-stretch textile bandages/compression system to decongest limb, and Kinesiotaping as appropriate. Due to patient's cancer status, the lymphedema has the potential to significantly worsen over time if it is not managed well. This care is medically necessary due to the infection risk with lymphedema and to improve functional activities. CDT is necessary to resolve swelling to allow patient to return to wearing normal clothes/footwear and prevent worsening of symptoms, such as infections and/or hospitalizations. Patient will be independent with home program strategies to allow improved ADL ability and mobility and to allow patient to return to greatest functional independence. Rehabilitation potential is considered to be Good. Factors which may influence rehabilitation potential include baseline independence, high motivation, strong social support. Patient will benefit from 12 physical therapy visits over 12 weeks to optimize improvement in these areas.     PLAN OF CARE:   Recommendations and Planned Interventions: Manual lymph drainage/decongestive therapy, Multi-layer compression bandaging (short-stretch), Compression garment fitting/provision, Lymphedema therapeutic exercise, AROM/PROM/Strength/Coordination, Self-care training, Functional mobility training, Education in skin care and lymphedema precautions, Self-MLD education per home program, Self-bandaging education per home program, Caregiver education as needed, and Would care as needed    GOALS  Short term goals  Time frame: 6 weeks  1. Patient will demonstrate knowledge of signs/symptoms of infections/cellulitis and be independent in skin care to prevent cellulitis. 2.  Patient will demonstrate independence in lymphedema home program of therapeutic exercises to improve circulation and decongest limb to improve ADLs. 3.  Patient will tolerate compression therapy to the LUE and show measureable decrease in limb volume to <5% difference to allow ordering of home compression system (daytime, nighttime garments and pump as needed). 4. LUE ROM to equal RUE ROM as required for improved independence with performing ADLs, and self care such as bathing/grooming/dressing. Long term goals  Time frame: 12 weeks  1. Pt will show improvement in the LLIS by decreasing the score to 24/68 and thus allow improvement in patient's quality of life. 2.  Patient will be independent with don/doff of compression system and use in order to prevent reaccumulation of fluid at discharge. 3.  Pt will be independent in self-MLD and show stable limb volumes showing decongestion and pt. ready for transition to independent restorative phase of lymphedema therapy. Patient has participated in goal setting and agrees to work toward plan of care. Patient was instructed to call if questions or concerns arise. Thank you for this referral.  Hollis Henderson, PT , DPT, CLT   Time Calculation: 62 mins    TREATMENT PLAN EFFECTIVE DATES:   1/26/2023 TO 4/20/2023  I have read the above plan of care for [address].   I certify the above prescribed services are required by this patient and are medically necessary.   The above plan of care has been developed in conjunction with the lymphedema/physical therapist.       Physician Signature: ____________________________________Date:______________

## 2023-02-02 ENCOUNTER — HOSPITAL ENCOUNTER (OUTPATIENT)
Dept: PHYSICAL THERAPY | Age: 63
Discharge: HOME | End: 2023-02-02
Payer: COMMERCIAL

## 2023-02-02 PROCEDURE — 97530 THERAPEUTIC ACTIVITIES: CPT

## 2023-02-02 PROCEDURE — 97140 MANUAL THERAPY 1/> REGIONS: CPT

## 2023-02-02 PROCEDURE — 97110 THERAPEUTIC EXERCISES: CPT

## 2023-02-02 NOTE — PROGRESS NOTES
LYMPHEDEMA PT DAILY TREATMENT NOTE - [facility] 2-15    Patient Name: Joanna Doyle  Date:2023  : 1960  [x]  Patient  Verified  Payor: Laila Emanuel / Plan: [address] [place] [address] / Product Type: PPO /    In VUXE:[id number]  Out time:0907  Total Treatment Time (min): 53  Total Timed Codes (min): 53  Visit #: 2     Treatment Area: Malignant neoplasm of unspecified site of left female breast [C50.912]  Lymphedema, not elsewhere classified [I89.0]    SUBJECTIVE  Pain Level (0-10 scale): 4-5/10 L wrist  Any medication changes, allergies to medications, adverse drug reactions, diagnosis change, or new procedure performed?: [x] No    [] Yes (see summary sheet for update)  Subjective functional status/changes:   [x] No changes reported  Patient reports she has been compliant with wearing compression sleeve. She notes her wrist and forearm have softened and vora not feel as heavy. She also notes she may be having some joint pain due to the weather change because the temperature dropped and it started snowing, that usually will flare her arthritis up. Patient agreeable to treatment. OBJECTIVE    8 min Therapeutic Exercise:  [] Dhruv Junes Exercises [] Remedial Lymphedema Exercise Program [] Axillary Web Exercise Program      [x] Shoulder ROM Exercises  Door way stretch hi and middle 3x30s, and towel stretch IR L shoulder 3x30s   Rationale: Activate muscle pump to improve lymphatic fluid movement and decrease swelling to improve the patients ability to perform ADL and IADL skills and prevent worsening of swelling over time. 10 min Therapeutic Activity:    Volumetric measurements re-measured demonstrating decrease in LUE edema reducing percent difference between UEs to 5.43% from 8.67%. Patient educated and instructed in s/s of infection, and continued wear schedule. Rationale:  improve LUE functional activity tolerance .     35 min Manual Therapy    Kinesiotaping: deferred       Manual Lymphatic Drainage (MLD):  Area to decongest: UE: left palm wrist elbow mid forearm axilla   Sequence used and effectiveness: Modifications were made to manual lymph drainage sequence to exclude cervical techniques secondary to patient's age. Secondary sequence for upper extremity without trunk involvement. Skin/wound care/debridement: Reviewed skin care principles:   Skin care products  Sarna for itching   Applied multi-layer compression bandaging to: Patient arrived with compression sleeve donned. left  upper extremity      Rationale: decrease pain, increase ROM, increase tissue extensibility, and decrease edema  to improve the patients ability to all ADLs, and functional activity participation            With   [x] TE   [x] TA   [x] MT   [] SC   [] other: Patient Education: [x] Review HEP    [x] MLD Patient Education Reviewed with patient, as well as demonstration and instruction during MLD portion of the session. [x] Progressed/Changed HEP based on:   [] positioning   [] Kinesiotape   [x] Skin care   [] wound care   [] other:   [] Precautions. Patient / caregiver re-demonstrated bandaging. [] Yes  [x] No  Compression bandaging/garment precautions reviewed: [x] Yes  [] No     Other Objective/Functional Measures:   Lymphedema Life Impact Scale (LLIS): 34/68 50%. Height:     Weight:      BMI:     (36 or greater: adversely affecting lymphedema)    Volumes:  Date Right (mL) Left (mL) Volume difference %  Difference   2/2/23 3118.99 3288.45 169.46 5.43   1/26/23 3118.99 3389.26 270.27 8.67     Pain Level (0-10 scale) post treatment: 2/10 L wrist      ASSESSMENT/Changes in Function:   Patient tolerating treatment well today able to complete all sets/reps fo exercise, and reporting decreased pain following manual therapy. Patient demonstrates reduced volumetric measurements with consistent wear of compression sleeve without increased volume of the hand and fingers.  Compression sleeve and gauntlet as been ordered, and patient to transition to edema maintenance upon fit testing. Patient will benefit from continued ROM interventions in order to promote optimal functional ROM of the LUE, and progression of therapeutic exercises. Continued skilled therapy is medically necessary in order to promote return to PLOF. Patient will continue to benefit from skilled PT services to  address functional mobility deficits, address ROM deficits, address swelling, analyze and address soft tissue restrictions, analyze and cue movement patterns, analyze compression product fit and use, instruct in home lymphedema management program, measure for compression products, and modify and progress therapeutic interventions to attain remaining goals. [x]  See Plan of Care  []  See progress note/recertification  []  See Discharge Summary         Progress towards goals / Updated goals:  GOALS  Short term goals  Time frame: 6 weeks  1. Patient will demonstrate knowledge of signs/symptoms of infections/cellulitis and be independent in skin care to prevent cellulitis. 2.  Patient will demonstrate independence in lymphedema home program of therapeutic exercises to improve circulation and decongest limb to improve ADLs. 3.  Patient will tolerate compression therapy to the LUE and show measureable decrease in limb volume to <5% difference to allow ordering of home compression system (daytime, nighttime garments and pump as needed). 4. LUE ROM to equal RUE ROM as required for improved independence with performing ADLs, and self care such as bathing/grooming/dressing. Long term goals  Time frame: 12 weeks  1. Pt will show improvement in the LLIS by decreasing the score to 24/68 and thus allow improvement in patient's quality of life. 2.  Patient will be independent with don/doff of compression system and use in order to prevent reaccumulation of fluid at discharge.   3.  Pt will be independent in self-MLD and show stable limb volumes showing decongestion and pt. ready for transition to independent restorative phase of lymphedema therapy    PLAN  []  Upgrade activities as tolerated     [x]  Continue plan of care  []  Update interventions per flow sheet       []  Discharge due to:_  []  Other:_      Alexandra Zabala, PT, DPT, CLT  2/2/2023

## 2023-02-13 ENCOUNTER — DOCUMENTATION ONLY (OUTPATIENT)
Dept: SURGERY | Age: 63
End: 2023-02-13

## 2023-03-10 ENCOUNTER — HOSPITAL ENCOUNTER (OUTPATIENT)
Dept: PHYSICAL THERAPY | Age: 63
Discharge: HOME | End: 2023-03-10
Payer: COMMERCIAL

## 2023-03-10 PROCEDURE — 97530 THERAPEUTIC ACTIVITIES: CPT

## 2023-03-10 PROCEDURE — 97760 ORTHOTIC MGMT&TRAING 1ST ENC: CPT

## 2023-03-10 NOTE — PROGRESS NOTES
[address] [facility] [address]  Kassidy Galvan    LYMPHEDEMA THERAPY DISCHARGE NOTE      3/10/2023:  Patient will be discharged from lymphedema therapy at this time. Criteria for termination of care:  Goals achieved    ASSESSMENT/Changes in Function:   Patient presents for 2nd follow up since IE. Progress documented during session to note patient has met 7/7 short term and long term goals since IE. Patient has demonstrated independence with performance of home exercise program, and now demonstrates ability to perform B AROM equal and WNL without pain aiding in ability to perform all ADLs/IADLs and recreational activities as required. Curing session today volumes re-checked revealing continued reduction of LUE volume to 3.22% less than the unaffected limb meeting all long term goals related to volumetric changes. Patient also demonstrates reduction in disability as evidenced by scoring on LLIS outcome measure from 34/68 at evaluation to 2/68 currently representing 50% impairment to 2.9% impairment. Patient has demonstrated adequate knowledge of s/s of infection, symptoms of progressing lymphedema, garment wear schedule and maintenance, as well as performance of HEP at this time. Patient has met all goals and will be discharged to maintenance with use of compression garments. Patient instructed to continue monitoring condition, and contact therapist or MD if symptoms associated to pain or lymphedema progress. Progress towards goals / Updated goals:  GOALS  Short term goals  Time frame: 6 weeks  1. Patient will demonstrate knowledge of signs/symptoms of infections/cellulitis and be independent in skin care to prevent cellulitis. MET 3/10/23  2. Patient will demonstrate independence in lymphedema home program of therapeutic exercises to improve circulation and decongest limb to improve ADLs. MET 3/10/23  3. Patient will tolerate compression therapy to the LUE and show measureable decrease in limb volume to <5% difference to allow ordering of home compression system (daytime, nighttime garments and pump as needed). MET 3/10/23  4. LUE ROM to equal RUE ROM as required for improved independence with performing ADLs, and self care such as bathing/grooming/dressing. MET 3/10/23  Long term goals  Time frame: 12 weeks  1. Pt will show improvement in the LLIS by decreasing the score to 24/68 and thus allow improvement in patient's quality of life. MET 3/10/23  2. Patient will be independent with don/doff of compression system and use in order to prevent reaccumulation of fluid at discharge. MET 3/10/23  3. Pt will be independent in self-MLD and show stable limb volumes showing decongestion and pt. ready for transition to independent restorative phase of lymphedema therapy MET 3/10/23     PLAN  []  Upgrade activities as tolerated     []  Continue plan of care  []  Update interventions per flow sheet       [x]  Discharge due to: Goals Met  []  Other:_      If you need any further information, please contact us at [phone].     Thank you for this referral.  Federica Duarte, PT, DPT, CLT

## 2023-03-10 NOTE — PROGRESS NOTES
LYMPHEDEMA PT DAILY TREATMENT NOTE - [facility] 2-15    Patient Name: Denise Reynolds  Date:3/10/2023  : 1960  [x]  Patient  Verified  Payor: BLUE CROSS / Plan: [address] [place] [address] / Product Type: PPO /    In ROGQ:[id number]  Out time:939  Total Treatment Time (min):24  Total Timed Codes (min): 24  Visit #: 3     Treatment Area: Malignant neoplasm of unspecified site of left female breast [C50.912]  Lymphedema, not elsewhere classified [I89.0]    SUBJECTIVE  Pain Level (0-10 scale): 0/10 L wrist  Any medication changes, allergies to medications, adverse drug reactions, diagnosis change, or new procedure performed?: [x] No    [] Yes (see summary sheet for update)  Subjective functional status/changes:   [x] No changes reported  Patient reports she has been compliant with wearing her compression sleeve. Patient notes she has received her new sleeve and gauntlet. She tried them on at home and they fit well. She has been participating in exercises given to her by her PT and now feels she can move her arm without increased pain. She does note some discomfort of the L wrist when she woke up on Wednesday, but it dissipated in the morning. She attributes it to sleeping on her wrist weird. Otherwise she has been able to perform all her daily duties without pain. Patient agreeable to treatment. OBJECTIVE     min Therapeutic Exercise:  [] Nolon Fan Exercises [] Remedial Lymphedema Exercise Program [] Axillary Web Exercise Program      [x] Shoulder ROM Exercises  Door way stretch hi and middle 3x30s, and towel stretch IR L shoulder 3x30s   Rationale: Activate muscle pump to improve lymphatic fluid movement and decrease swelling to improve the patients ability to perform ADL and IADL skills and prevent worsening of swelling over time. 14 min Therapeutic Activity:    Volumetric measurements re-measured demonstrating decrease in LUE volume to 3.22% less than unaffected limb.  Patient educated and reviewed regarding s/s of infection with patient able to verbalize 3/3 s/s independently during session. Patient educated and reviewed high risk activities to include flying, changes in elevation, high heat environments, and importance of proper skin care. Patient verbalizes understanding. AROM screening performed with patient demonstrating BUE AROM equal and painless B as required for performance of all ADLs/IADLs. Rationale:  improve LUE functional activity tolerance . 10 min Orthotic Management Subsequent Encounter  Upper/Lower Extremity Compression: Fitted for the following compression products:     UE: left palm wrist elbow mid forearm axilla left upper extremity: Arm sleeve and Gauntlet     Style: Circular knit     Brand: [employer]     Type: Non-Custom: Size: 2001 size IV Max Regular with silicone band class II, Gauntlet size L class II      Vendor: [employer]     Education: Educated patient in compression garment donning and doffing. Glove use with donning. Daily wear schedule. Daily laundering. Garment lifespan. Return and reordering process (by bringing prior garments into the clinic). Educated patient to monitor for redness or pressure points on the skin. If new pain occurs they should contact their therapist.     Patient/family demonstrated donning and doffing with independence     HOME PROGRAM:  Compression Arm Sleeve Instructions:  Apply the sleeve and glove on a clean, dry arm first thing in the morning. Wear all day until you go to bed. Adjust the sleeve during the day as needed with rubber gloves to protect the fabric, watching area at the wrist and bend in the elbow, removing any creases in the sleeve that may occur with movement. Perform arm exercises as instructed with sleeve in place. Do not wear the sleeve without the hand piece for extended periods of time; it is ok to eat meals, wash dishes, or perform grooming activities without the glove in place, but remember to put it back on afterwards. Discontinue wearing your sleeve under the following conditions:  -numbness/tingling in the extremity   - Compromise in circulation: fingers feel cold   -onset of pain in the arm that is sudden and severe in nature  -Redness, warmth in the limb and/or fever, flu-like symptoms, which may indicate an infection. If this occurs, call your doctor right away and discontinue wearing the sleeve and gauntlet   -skin irritation or breakdown     Wash instructions: You need to wash and dry both your glove and your sleeve after each wear in order for this piece of medical equipment to offer prescribed compression to effectively manage your lymphedema. Wash it in a garment bag or pillow case in cool water, gentle cycle, no bleach, no fabric softener, and no woolite. You may hand wash it if you prefer. Place it in the dryer on low heat to help restore the elasticity of the garments. Rationale: decrease pain, increase ROM, increase tissue extensibility, and decrease edema  to improve the patients ability to all ADLs, and functional activity participation            With   [x] TE   [x] TA   [x] MT   [] SC   [] other: Patient Education: [x] Review HEP    [x] MLD Patient Education Reviewed with patient, as well as demonstration and instruction during MLD portion of the session. [x] Progressed/Changed HEP based on:   [] positioning   [] Kinesiotape   [x] Skin care   [] wound care   [] other:   [] Precautions. Patient / caregiver re-demonstrated bandaging.  [] Yes  [x] No  Compression bandaging/garment precautions reviewed: [x] Yes  [] No     Other Objective/Functional Measures:   Lymphedema Life Impact Scale (LLIS): 89/00 50% at evaluation, and 2/68 or 2.9% today   Height:     Weight:      BMI:     (36 or greater: adversely affecting lymphedema)    Volumes:  Date Right (mL) Left (mL) Volume difference %  Difference   3/10/23 3118.99 3018.66 -100.33 -3.22   2/2/23 3118.99 3288.45 169.46 5.43   1/26/23 3118.99 3389.26 270.27 8.67     Pain Level (0-10 scale) post treatment: 0/10 overall      ASSESSMENT/Changes in Function:   Patient presents for 2nd follow up since IE. Progress documented during session to note patient has met 7/7 short term and long term goals since IE. Patient has demonstrated independence with performance of home exercise program, and now demonstrates ability to perform B AROM equal and WNL without pain aiding in ability to perform all ADLs/IADLs and recreational activities as required. Patient also demonstrates reduction in disability as evidenced by scoring on LLIS outcome measure from 34/68 at evaluation to 2/68 currently representing 50% impairment to 2.9% impairment. Patient has demonstrated adequate knowledge of s/s of infection, symptoms of progressing lymphedema, garment wear schedule and maintenance, as well as performance of HEP at this time. Patient has met all goals and will be discharged to maintenance with use of compression garments. Patient instructed to continue monitoring condition, and contact therapist or MD if symptoms associated to pain or lymphedema progress. []  See Plan of Care  []  See progress note/recertification  [x]  See Discharge Summary         Progress towards goals / Updated goals:  GOALS  Short term goals  Time frame: 6 weeks  1. Patient will demonstrate knowledge of signs/symptoms of infections/cellulitis and be independent in skin care to prevent cellulitis. MET 3/10/23  2. Patient will demonstrate independence in lymphedema home program of therapeutic exercises to improve circulation and decongest limb to improve ADLs. MET 3/10/23  3. Patient will tolerate compression therapy to the LUE and show measureable decrease in limb volume to <5% difference to allow ordering of home compression system (daytime, nighttime garments and pump as needed). MET 3/10/23  4.  LUE ROM to equal RUE ROM as required for improved independence with performing ADLs, and self care such as bathing/grooming/dressing. MET 3/10/23  Long term goals  Time frame: 12 weeks  1. Pt will show improvement in the LLIS by decreasing the score to 24/68 and thus allow improvement in patient's quality of life. MET 3/10/23  2. Patient will be independent with don/doff of compression system and use in order to prevent reaccumulation of fluid at discharge. MET 3/10/23  3.   Pt will be independent in self-MLD and show stable limb volumes showing decongestion and pt. ready for transition to independent restorative phase of lymphedema therapy MET 3/10/23    PLAN  []  Upgrade activities as tolerated     []  Continue plan of care  []  Update interventions per flow sheet       [x]  Discharge due to: Goals Met  []  Other:_      Celestina Sanford, PT, DPT, CLT  3/10/2023

## 2023-03-28 ENCOUNTER — TELEPHONE (OUTPATIENT)
Dept: OBGYN CLINIC | Age: 63
End: 2023-03-28

## 2023-03-28 NOTE — TELEPHONE ENCOUNTER
Two patient identifiers used  [facility] with [facility] mail order pharmacy calling to ask about the prescription sent on     clobetasoL (TEMOVATE) 0.05 % ointment [[id number]]     Order Details  Dose, Route, Frequency: As Directed   Dispense Quantity: 45 g Refills: 3          Sig: Apply to affected area daily for 1 month, then every other day for 1 month, then continue 2x/wk for maintenance. Patient not taking: Reported on 1/18/2023         Start Date: 10/31/22 End Date: --   Written Date: 10/31/22 Expiration Date: --   Original Order:  clobetasoL (TEMOVATE) 0.05 % ointment [[id number]]       This nurse advised of MD instructions for the medication and that the patient is being followed on annual basis    Pharmacy verbalized understanding.

## 2023-04-17 NOTE — PROGRESS NOTES
[facility] [place] at [place] [address] [address], [address], [place]: [phone]  F: [phone]    Reason for Visit:   Ran Pittman is a 57 y.o. female who is seen for fu of breast cancer. Treatment History:   10/31/2022: LEFT breast mass, ultrasound-guided biopsy. PATH: IDC with tubular features, 0.4cm in greatest dimension in a single core, grade 1, ER+(100%), [place]+(100%), HER2-, Ki-67(5%). 11/29/2022: BILATERAL mastectomies and LEFT SNLbx. - LEFT breast simple mastectomy: IDC, 3 separate foci measuring 35, 8 and 7 mm in greatest dimension, Grade 1, negative margins, 1/5 lymph nodes with metastatic carcinoma. Pathologic stage: pT2, pN1a. Focal atypical ductal hyperplasia. Intraductal papilloma. -RIGHT breast simple mastectomy: Intraductal papilloma. Usual ductal hyperplasia. Fibroadenomatoid change. 11/29/2022: Mammaprint: LOW RISK, Luminal type A, +0.538. Started AI 1/23      STAGE: PATHOLOGIC STAGE CLASSIFICATION (pTNM, AJCC 8th Edition)       TNM Descriptors: m (multiple foci of invasive carcinoma)       Primary Tumor (pT): pT2       Regional Lymph Nodes Modifier: (sn): [place] node(s) evaluated       Regional Lymph Nodes (pN): pN1a   Invasive carcinoma   ER          [place]   Interpretation:     Positive     Interpretation:     Positive   Nuclear Staining %:     100%     Nuclear Staining %:     100%   Intensity:     Strong     Intensity:     Strong          HER-2/keren  Immunohistochemistry for Breast tumors   Results:     Negative, Score = 1+   Ki-67 Immunohistochemical Assay  Result:          5% nuclear staining in invasive carcinoma     History of Present Illness:     Pt seen today for office fu for breast cancer on adjuvant AI. Tolerating ok so far with occ hot flashes. Had knee replacement/ using a cane. No fevers/ chills/ chest pain/ SOB/ nausea/ vomiting/diarrhea/            Last visit:   consult for new LEFT breast cancer post b/l mastectomy. Initially had an abnormal screening mammo on LEFT  Biopsy IDC. Breast MRI:  IMPRESSION  1. Left upper inner quadrant breast mass (9 x 12 mm). BI-RADS 6.  2. Multiple satellite masses, and a segmental distribution, extending from  anterior to posterior third, over 62 mm. BI-RADS 4.  3. No lymphadenopathy. 4. No suspicious contralateral enhancement. 5. Overall assessment: BI-RADS Assessment Category 4: Suspicious abnormality. 6. Recommendation: MRI guided left breast biopsy could be performed if it would  . MP low risk  Had b/l mastectomy 11/22. Did well. Here to discuss adjuvant systemic therapy. Has lots of medical problems/ MCTD on meds. Feels fine today. No fevers/ chills/ chest pain/ SOB/ nausea/ vomiting/diarrhea/ pain/fatigue        Past Medical History:   Diagnosis Date    Abnormal screening mammogram 10/10/2022    focal asymmetry in the left breast, slightly medial to the nipple line in the deep 3rd    Asthma     activity induced asthma     Autoimmune disease ([address]     mixed connective tissue disease    Breast cancer ([address] 10/2022    bilat mastectomy    Cancer ([address] 2013    skin-BCCA    Chronic pain     joints    DDD (degenerative disc disease), lumbar     Dense breast tissue on mammogram 10/02/2020    BI-RADS 1: Negative.     DJD (degenerative joint disease)     Environmental and seasonal allergies     ETOH abuse     Sober 12-13-11    Fibromyalgia     Gastric bleeding 2020    R/T NSAIDS    GERD (gastroesophageal reflux disease)     H/O diagnostic mammography 10/19/2022    BI-RADS Assessment Category 5: Highly suggestive of malignancy- Appropriate NEEDS BIOPSY LEFT BREAST    Hernia 2014    incisional hernia- repaired 2014    Hiatal hernia     Hx of bronchitis     related to allergies    Hx of mammogram 10/04/2021    Negative     Hx of sinusitis     related to allergies    Hyperlipemia     Hypertension     Hyponatremia 2011    Insomnia     Lupus (HCC)     Neuropathic pain Osteopenia     Other bursitis of hip, right hip 09/14/2016    Had a deep hip injection for pain     Psychiatric disorder     anxiety    Routine Papanicolaou smear 09/20/2016    Negative (no hpv)     Seizures [facility] 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     Sleep apnea     C-PAP    Stroke ([facility] 03/2011    PCP states she had a TIA - patient denies this (was low Na), pt denies this    Tobacco abuse     Stopped in 2012      Past Surgical History:   Procedure Laterality Date    HX BREAST BIOPSY Left 10/31/2022    HX CYST INCISION AND DRAINAGE Left 08/2016    shoulder    HX HERNIA REPAIR  10/07/2014    incisional with mesh    HX LAP CHOLECYSTECTOMY  03/11/2014    HX LUMBAR FUSION      10/8/2012    HX LUMBAR LAMINECTOMY      rods in 1996, out [address]  02/2017    HX MALIGNANT SKIN LESION EXCISION Right 2013    ear-BCCA    HX MASTECTOMY Bilateral 11/29/2022    BILATERAL BREAST TOTAL MASTECTOMIES, LEFT BREAST SENTINEL NODE BIOPSY performed by Priscilla Francisco MD at [facility] Bilateral 2013 R, 2014 L    carpal tunnel    HX ORTHOPAEDIC  06/27/2013    right knee arthroscopy    HX ORTHOPAEDIC Right 03/2015    trigger thumb release    HX ORTHOPAEDIC  10/08/2012    ALIF/ PLIF with bone stimulator    HX ORTHOPAEDIC  10/23/2012    Back surgery to adjust hardware    HX ORTHOPAEDIC  01/2018    ALIF - fusion L2-L3    HX ORTHOPAEDIC  04/01/2019    L3-S1 hardware revision. T10 to pelvis PSIF. L1-L2 lami. L2-L3 lami revision.     HX ORTHOPAEDIC Left 06/2021    toe surgery    HX ORTHOPAEDIC Left 2020    thumb trigger release    HX OTHER SURGICAL  10/08/2012    [facility] Bone Growth Stimulator    HX TONSIL AND ADENOIDECTOMY  1962    HX TOTAL ABDOMINAL HYSTERECTOMY  12/20/1992    fibroids; Dr. Hernández Rather History     Tobacco Use    Smoking status: Former     Packs/day: 2.00     Years: 32.00     Pack years: 64.00     Types: Cigarettes     Quit date: 7/1/2012     Years since quitting: 10.8    Smokeless tobacco: Never    Tobacco comments:     Never used vapor or e-cigs    Substance Use Topics    Alcohol use: No     Alcohol/week: 0.0 standard drinks     Comment: Sober 11 years      Family History   Problem Relation Age of Onset    Hypertension Mother     Stroke Mother         TIA    OSTEOARTHRITIS Father         RA    No Known Problems Sister     No Known Problems Brother     No Known Problems Sister     No Known Problems Sister      Current Outpatient Medications   Medication Sig    oxyCODONE IR (ROXICODONE) 5 mg immediate release tablet Take 1 Tablet by mouth every four (4) hours as needed. Max Daily Amount: 30 mg.    rivaroxaban (XARELTO) 10 mg tablet Take 1 Tablet by mouth daily. anastrozole (ARIMIDEX) 1 mg tablet Take 1 mg by mouth daily. fluticasone propion-salmeteroL (ADVAIR/WIXELA) 250-50 mcg/dose diskus inhaler Take 1 Puff by inhalation two (2) times a day. ferrous sulfate 325 mg (65 mg iron) tablet Take 1 Tablet by mouth Daily (before breakfast). adalimumab (Humira,CF,) 40 mg/0.4 mL sykt 40 mg by SubCUTAneous route two (2) times a week. methotrexate (RHEUMATREX) 2.5 mg tablet Take 4 Tablets by mouth Every Friday. multivitamin (ONE A DAY) tablet Take 1 Tablet by mouth daily. calcium carbonate (CALTREX) 600 mg calcium (1,500 mg) tablet Take 1 Tablet by mouth two (2) times a day. COPPER PO Take 4 mg by mouth every evening. acetaminophen-codeine (TYLENOL #3) 300-30 mg per tablet Take 1 Tablet by mouth three (3) times daily as needed for Pain. zinc 50 mg tab tablet Take 1 Tablet by mouth every evening. sertraline 200 mg cap Take 200 mg by mouth every evening. valsartan (DIOVAN) 320 mg tablet Take 1 Tablet by mouth Every morning. dexlansoprazole (DEXILANT) 60 mg CpDB capsule (delayed release) Take 1 Capsule by mouth Every morning. pregabalin (LYRICA) 225 mg capsule Take 1 Capsule by mouth two (2) times a day.     albuterol (PROVENTIL HFA, VENTOLIN HFA, PROAIR HFA) 90 mcg/actuation inhaler Take 2 Puffs by inhalation every six (6) hours as needed for Wheezing. cyanocobalamin 1,000 mcg tablet Take 1 Tablet by mouth daily. cycloSPORINE (RESTASIS) 0.05 % dpet Administer 1 Drop to both eyes two (2) times a day. folic acid (FOLVITE) 1 mg tablet Take 1 Tablet by mouth two (2) times a day. gemfibroziL (LOPID) 600 mg tablet Take 1 Tablet by mouth Before breakfast and dinner. hydrOXYchloroQUINE (PLAQUENIL) 200 mg tablet Take 1 Tablet by mouth two (2) times a day. cholecalciferol, vitamin D3, 50 mcg (2,000 unit) tab Take 1 Tablet by mouth daily. pyridoxine, vitamin B6, (VITAMIN B-6) 100 mg tablet Take 1 Tablet by mouth daily. estradioL (ESTRACE) 0.01 % (0.1 mg/gram) vaginal cream     oxyCODONE IR (ROXICODONE) 5 mg immediate release tablet     Xarelto 10 mg tablet     famotidine (PEPCID) 20 mg tablet     clobetasoL (TEMOVATE) 0.05 % ointment Apply to affected area daily for 1 month, then every other day for 1 month, then continue 2x/wk for maintenance. (Patient not taking: Reported on 1/18/2023)     No current facility-administered medications for this visit. Allergies   Allergen Reactions    Nsaids (Non-Steroidal Anti-Inflammatory Drug) Other (comments)     Gastric bleeding    Penicillins Shortness of Breath and Rash     9/14/16 Reports able to take Keflex without problem. Tolerated ancef in 12/12    Tramadol Shortness of Breath    Adhesive Tape-Silicones Rash        A complete review of systems was obtained, negative except as described above and as reported on ROS sheet scanned into system.      Physical Exam:   Visit Vitals  /77 (BP 1 Location: Right upper arm, BP Patient Position: Sitting, BP Cuff Size: Adult)   Pulse 70   Temp 97.4 °F (36.3 °C) (Temporal)   Resp 16   Ht 5' 5\" (1.651 m)   Wt 189 lb (85.7 kg)   SpO2 98%   BMI 31.45 kg/m²       ECOG PS: 0  General: No distress  Eyes: PERRLA, anicteric sclerae  HENT: Atraumatic  Neck: Supple  Respiratory: CTAB, normal respiratory effort  CV: Normal rate, regular rhythm, no murmurs, no peripheral edema  GI: Soft, nontender, nondistended, no masses  MS: Normal gait and station. Digits without clubbing or cyanosis. Skin: No rashes, ecchymoses, or petechiae. Normal temperature, turgor, and texture. Psych: Alert, oriented, appropriate affect, normal judgment/insight  Neuro non focal  Breast b/l mastectomy, no lesions/ masses    Results:     Lab Results   Component Value Date/Time    WBC 5.7 11/22/2022 11:05 AM    HGB 13.0 11/22/2022 11:05 AM    HCT 38.9 11/22/2022 11:05 AM    PLATELET 374 69/75/8104 11:05 AM    MCV 95.6 11/22/2022 11:05 AM    ABS. NEUTROPHILS 2.6 11/22/2022 11:05 AM    Hemoglobin (POC) 13.9 12/21/2015 09:39 AM    Hematocrit (POC) 41 12/21/2015 09:39 AM     Lab Results   Component Value Date/Time    Sodium 137 11/22/2022 11:05 AM    Potassium 4.6 11/22/2022 11:05 AM    Chloride 106 11/22/2022 11:05 AM    CO2 27 11/22/2022 11:05 AM    Glucose 91 11/22/2022 11:05 AM    BUN 20 11/22/2022 11:05 AM    Creatinine 1.00 11/22/2022 11:05 AM    GFR est AA >60 12/21/2020 06:00 PM    GFR est non-AA >60 12/21/2020 06:00 PM    Calcium 8.7 11/22/2022 11:05 AM    Sodium (POC) 135 (L) 12/21/2015 09:39 AM    Potassium (POC) 4.4 12/21/2015 09:39 AM    Chloride (POC) 100 12/21/2015 09:39 AM    Glucose (POC) 95 12/21/2015 09:39 AM    BUN (POC) 6 (L) 12/21/2015 09:39 AM    Creatinine (POC) 0.80 11/10/2022 08:39 AM    Calcium, ionized (POC) 1.13 12/21/2015 09:39 AM     Lab Results   Component Value Date/Time    Bilirubin, total 0.4 12/21/2020 06:00 PM    ALT (SGPT) 33 12/21/2020 06:00 PM    Alk.  phosphatase 74 12/21/2020 06:00 PM    Protein, total 7.0 12/21/2020 06:00 PM    Albumin 4.0 12/21/2020 06:00 PM    Globulin 3.0 12/21/2020 06:00 PM     MRI Results (most recent):  Results from [place] encounter on 11/10/22    MRI BREAST BI W WO CONT    Narrative  INDICATION: Left breast carcinoma    COMPARISON: Multiple prior breast imaging studies from October 2022. TECHNIQUE:  Multisequence, multiplanar, bilateral breast MRI was performed in prone position  using a dedicated breast coil. Images were obtained without contrast and dynamic  postcontrast images were obtained in multiple phases. 9 mL IV Gadavist was  administered. Subtraction images were reconstructed. Postcontrast images were  reviewed with dedicated kinetic analysis software. FINDINGS:  There is mild background parenchymal enhancement and heterogeneous  fibroglandular tissue. The spiculated mass at 10:00 in the middle to posterior  third of the left breast measures 9 x 12 x 12 mm. A biopsy clip lies immediately  anterior to it. Multiple, subcentimeter, satellite masses in a segmental/ray  distribution extend over 62 mm anteroinferiorly in the upper inner quadrant. The  anteroinferior most mass is at the level of the nipple line. No axillary or  internal mammary chain lymphadenopathy. No suspicious contralateral enhancement. No right lymphadenopathy. A summary portfolio with key images has been sent from kinetic analysis software  to PACS. Impression  1. Left upper inner quadrant breast mass (9 x 12 mm). BI-RADS 6.  2. Multiple satellite masses, and a segmental distribution, extending from  anterior to posterior third, over 62 mm. BI-RADS 4.  3. No lymphadenopathy. 4. No suspicious contralateral enhancement. 5. Overall assessment: BI-RADS Assessment Category 4: Suspicious abnormality. 6. Recommendation: MRI guided left breast biopsy could be performed if it would  . Records reviewed and summarized above. Pathology report(s) reviewed above. Radiology report(s) reviewed above.       Assessment:/PLAN     1)  LEFT breast cancer dx 10/22 post b/l mastectomy 11/22  PATHOLOGIC STAGE CLASSIFICATION (pTNM, AJCC 8th Edition)       TNM Descriptors: m (multiple foci of invasive carcinoma) Primary Tumor (pT): pT2       Regional Lymph Nodes Modifier: (sn): [place] node(s) evaluated       Regional Lymph Nodes (pN): pN1a   Invasive carcinoma   ER          [place]   Interpretation:     Positive     Interpretation:     Positive   Nuclear Staining %:     100%     Nuclear Staining %:     100%   Intensity:     Strong     Intensity:     Strong          HER-2/keren  Immunohistochemistry for Breast tumors   Results:     Negative, Score = 1+   Ki-67 Immunohistochemical Assay  Result:          5% nuclear staining in invasive carcinoma     low risk MP today. No adjuvant chemo. Did not see Rad/onc per pt preference. Seen today for fu.   on adjuvant hormonal therapy today with AI  tolerating well. Pt clinically stable today. Feels well. Did knee surgery. Labs and routine HM with PCP/ Rheum. No records in our system. Fu in 6 months, seeing surgery in 3 months    2) MCTD/ Lupus. Per Rheum. 3) arthritis/ HTN/ sleep apnea/ uterine fibroids/ hx of colon polyps. Per PCP. 4) vaginal dryness on vaginal estrogen. Ok to use and pt will check with GYN also. 4) Psychosocial. Mood good. Coping well. Here with wife today. Has good support. Fu here in 6 months, surgery in 3 months  Call if questions    I appreciate the opportunity to participate in Ms. Yinka Avalos [facility]'s care.     Signed By: Russell Boucher DO

## 2023-04-18 ENCOUNTER — OFFICE VISIT (OUTPATIENT)
Dept: ONCOLOGY | Age: 63
End: 2023-04-18
Payer: COMMERCIAL

## 2023-04-18 VITALS
DIASTOLIC BLOOD PRESSURE: 77 MMHG | TEMPERATURE: 97.4 F | HEART RATE: 70 BPM | HEIGHT: 65 IN | OXYGEN SATURATION: 98 % | RESPIRATION RATE: 16 BRPM | SYSTOLIC BLOOD PRESSURE: 130 MMHG | WEIGHT: 189 LBS | BODY MASS INDEX: 31.49 KG/M2

## 2023-04-18 DIAGNOSIS — C50.912 INFILTRATING DUCTAL CARCINOMA OF LEFT BREAST (HCC): Primary | ICD-10-CM

## 2023-04-18 PROCEDURE — 3075F SYST BP GE 130 - 139MM HG: CPT | Performed by: INTERNAL MEDICINE

## 2023-04-18 PROCEDURE — 99213 OFFICE O/P EST LOW 20 MIN: CPT | Performed by: INTERNAL MEDICINE

## 2023-04-18 PROCEDURE — 3078F DIAST BP <80 MM HG: CPT | Performed by: INTERNAL MEDICINE

## 2023-04-18 RX ORDER — OXYCODONE HYDROCHLORIDE 5 MG/1
5 TABLET ORAL
COMMUNITY
Start: 2023-04-13

## 2023-04-18 RX ORDER — ESTRADIOL 0.1 MG/G
CREAM VAGINAL
COMMUNITY
Start: 2023-03-25

## 2023-04-18 RX ORDER — FAMOTIDINE 20 MG/1
TABLET, FILM COATED ORAL
COMMUNITY
Start: 2023-01-27

## 2023-04-18 RX ORDER — RIVAROXABAN 10 MG/1
TABLET, FILM COATED ORAL
COMMUNITY
Start: 2023-03-22

## 2023-04-18 RX ORDER — OXYCODONE HYDROCHLORIDE 5 MG/1
TABLET ORAL
COMMUNITY
Start: 2023-04-13

## 2023-04-18 NOTE — PROGRESS NOTES
Gerhardt Ape is a 57 y.o. female    Chief Complaint   Patient presents with    Follow-up     3 month follow up       Visit Vitals  /77 (BP 1 Location: Right upper arm, BP Patient Position: Sitting, BP Cuff Size: Adult)   Pulse 70   Temp 97.4 °F (36.3 °C) (Temporal)   Resp 16   Ht 5' 5\" (1.651 m)   Wt 189 lb (85.7 kg)   SpO2 98%   BMI 31.45 kg/m²           1. Have you been to the ER, urgent care clinic since your last visit? Hospitalized since your last visit? NO    2. Have you seen or consulted any other health care providers outside of the [address] [place] [address] since your last visit? Include any pap smears or colon screening.  NO

## 2023-06-22 ENCOUNTER — OFFICE VISIT (OUTPATIENT)
Age: 63
End: 2023-06-22
Payer: COMMERCIAL

## 2023-06-22 VITALS — HEIGHT: 65 IN | WEIGHT: 182 LBS | BODY MASS INDEX: 30.32 KG/M2

## 2023-06-22 DIAGNOSIS — Z85.3 HISTORY OF BREAST CANCER IN FEMALE: ICD-10-CM

## 2023-06-22 DIAGNOSIS — C50.912 MALIGNANT NEOPLASM OF LEFT BREAST IN FEMALE, ESTROGEN RECEPTOR POSITIVE, UNSPECIFIED SITE OF BREAST (HCC): Primary | ICD-10-CM

## 2023-06-22 DIAGNOSIS — Z90.13 STATUS POST BILATERAL MASTECTOMY: ICD-10-CM

## 2023-06-22 DIAGNOSIS — Z17.0 MALIGNANT NEOPLASM OF LEFT BREAST IN FEMALE, ESTROGEN RECEPTOR POSITIVE, UNSPECIFIED SITE OF BREAST (HCC): Primary | ICD-10-CM

## 2023-06-22 PROCEDURE — 99213 OFFICE O/P EST LOW 20 MIN: CPT | Performed by: NURSE PRACTITIONER

## 2023-06-22 NOTE — PROGRESS NOTES
HISTORY OF PRESENT ILLNESS  Meenakshi Bush is a 57 y.o. female     HPI Established patient presents for follow-up for LEFT breast cancer. S/P BILATERAL mastectomy. No concerns today. Breast history -   Referring - Dr. Isi Joel  10/31/2022: LEFT breast mass, ultrasound-guided biopsy. PATH: IDC with tubular features, 0.4cm in greatest dimension in a single core, grade 1, ER+(100%), [place]+(100%), HER2-,  Ki-67(5%). 2022: BILATERAL mastectomies and LEFT SNLbx - Dr. Marivel Branham  - LEFT breast simple mastectomy: IDC, 3 separate foci measuring 35, 8 and 7 mm in greatest dimension, Grade 1, negative margins, 1/5 lymph nodes with metastatic carcinoma. Pathologic stage: pT2, pN1a. Focal atypical ductal hyperplasia. Intraductal papilloma. - RIGHT breast simple mastectomy: Intraductal papilloma. Usual ductal hyperplasia. Fibroadenomatoid change. 2022: Mammaprint: LOW RISK, Luminal type A, +0.538.  2023 - started AI - Dr. Kerby Duverney       Family history -   No family history of breast or ovarian cancer      OB History          0    Para        Term   0            AB        Living             SAB        IAB        Ectopic        Molar        Multiple        Live Births              Obstetric Comments   Menarche:  4. LMP: 32.  # of Children:  0. Age at Delivery of First Child:  n/a. Hysterectomy/oophorectomy:  YES/NO. Breast Bx:  Yes left . Hx of Breast Feeding:  n/a.   BCP:  no. Hormone therapy:  no.                 Past Surgical History:   Procedure Laterality Date    BREAST BIOPSY Left 10/31/2022    CHOLECYSTECTOMY, LAPAROSCOPIC  2014    CYST INCISION AND DRAINAGE Left 2016    shoulder    HERNIA REPAIR  10/07/2014    incisional with mesh    HYSTERECTOMY, TOTAL ABDOMINAL (CERVIX REMOVED)  1992    fibroids; Dr. Kimmy Costello      10/8/2012    LUMBAR LAMINECTOMY  2017    LUMBAR LAMINECTOMY      rods in , out [place] Right

## 2023-07-19 ENCOUNTER — TRANSCRIBE ORDERS (OUTPATIENT)
Facility: HOSPITAL | Age: 63
End: 2023-07-19

## 2023-07-19 DIAGNOSIS — M75.121 NONTRAUMATIC COMPLETE TEAR OF RIGHT ROTATOR CUFF: Primary | ICD-10-CM

## 2023-07-27 ENCOUNTER — HOSPITAL ENCOUNTER (OUTPATIENT)
Facility: HOSPITAL | Age: 63
Discharge: HOME | End: 2023-07-27
Attending: ORTHOPAEDIC SURGERY
Payer: MEDICARE

## 2023-07-27 DIAGNOSIS — M75.121 NONTRAUMATIC COMPLETE TEAR OF RIGHT ROTATOR CUFF: ICD-10-CM

## 2023-07-27 PROCEDURE — 73221 MRI JOINT UPR EXTREM W/O DYE: CPT

## 2023-09-05 DIAGNOSIS — Z17.0 MALIGNANT NEOPLASM OF LEFT BREAST IN FEMALE, ESTROGEN RECEPTOR POSITIVE, UNSPECIFIED SITE OF BREAST (HCC): Primary | ICD-10-CM

## 2023-09-05 DIAGNOSIS — C50.912 MALIGNANT NEOPLASM OF LEFT BREAST IN FEMALE, ESTROGEN RECEPTOR POSITIVE, UNSPECIFIED SITE OF BREAST (HCC): Primary | ICD-10-CM

## 2023-09-05 RX ORDER — ANASTROZOLE 1 MG/1
1 TABLET ORAL DAILY
Qty: 90 TABLET | Refills: 3 | Status: SHIPPED | OUTPATIENT
Start: 2023-09-05

## 2023-10-12 ENCOUNTER — OFFICE VISIT (OUTPATIENT)
Age: 63
End: 2023-10-12
Payer: COMMERCIAL

## 2023-10-12 VITALS
HEART RATE: 88 BPM | WEIGHT: 192 LBS | SYSTOLIC BLOOD PRESSURE: 141 MMHG | DIASTOLIC BLOOD PRESSURE: 68 MMHG | BODY MASS INDEX: 31.95 KG/M2

## 2023-10-12 DIAGNOSIS — Z01.419 ENCOUNTER FOR WELL WOMAN EXAM WITH ROUTINE GYNECOLOGICAL EXAM: Primary | ICD-10-CM

## 2023-10-12 DIAGNOSIS — Z85.3 PERSONAL HISTORY OF BREAST CANCER: ICD-10-CM

## 2023-10-12 PROCEDURE — 99397 PER PM REEVAL EST PAT 65+ YR: CPT | Performed by: OBSTETRICS & GYNECOLOGY

## 2023-10-12 PROCEDURE — 3078F DIAST BP <80 MM HG: CPT | Performed by: OBSTETRICS & GYNECOLOGY

## 2023-10-12 PROCEDURE — 3077F SYST BP >= 140 MM HG: CPT | Performed by: OBSTETRICS & GYNECOLOGY

## 2023-10-12 PROCEDURE — G8484 FLU IMMUNIZE NO ADMIN: HCPCS | Performed by: OBSTETRICS & GYNECOLOGY

## 2023-10-17 NOTE — PROGRESS NOTES
[facility] [place] at [address], [address] [place] [address], [address] [place], [place] [address]way: [phone]  F: [phone]    Reason for Visit:   Celso Mccauley is a 70 y.o. female seen today in office for follow up of Left Breast Cancer. Treatment History:   10/31/2022: LEFT Breast Mass, Ultrasound-Guided Biopsy. PATH: IDC with tubular features, 0.4cm in greatest dimension in a single core, grade 1, ER+(100%), [place]+(100%), HER2-,  Ki-67(5%)  11/29/2022: BILATERAL Mastectomies and LEFT SNLbx  LEFT Breast Simple Mastectomy: PATH - IDC, 3 separate foci measuring 35, 8 and 7 mm in greatest dimension, Grade 1, negative margins, 1/5 lymph nodes with metastatic carcinoma. Pathologic stage: pT2, pN1a. Focal atypical ductal hyperplasia. Intraductal papilloma  RIGHT breast simple mastectomy: PATH - Intraductal papilloma. Usual ductal hyperplasia. Fibroadenomatoid change  11/29/2022: Mammaprint: LOW RISK, Luminal type A, +0.538  No chemo or radiation   Adjuvant Anastrozole 1/23 - Current      STAGE: pT2 pN1a      History of Present Illness:   Celso Mccauley is a 70 y.o. female seen today in office for follow up of ER/IN+ Her2 Negative Left Breast Cancer on adjuvant Anastrozole since 1/23. She reports that she feels well overall today. She is tolerating Anastrozole well overall with occasional hot flashes that are tolerable. Her appetite and energy levels are good. She denies fever, chills, mouth sores, cough, SOB, CP, nausea, vomiting, diarrhea, and constipation. She has arthritic type pain in hands and feet.  She is here alone today    Past Medical History:   Diagnosis Date    Abnormal screening mammogram 10/10/2022    focal asymmetry in the left breast, slightly medial to the nipple line in the deep 3rd    Asthma     activity induced asthma     Autoimmune disease ([address])     mixed connective tissue disease    Autoimmune disorder ([address])     Breast cancer ([address]) 10/2022    bilat mastectomy    Cancer ([address]) 2013    skin-BCCA

## 2023-10-18 ENCOUNTER — OFFICE VISIT (OUTPATIENT)
Age: 63
End: 2023-10-18
Payer: MEDICARE

## 2023-10-18 VITALS
RESPIRATION RATE: 18 BRPM | TEMPERATURE: 98 F | HEART RATE: 62 BPM | WEIGHT: 194 LBS | SYSTOLIC BLOOD PRESSURE: 154 MMHG | DIASTOLIC BLOOD PRESSURE: 71 MMHG | BODY MASS INDEX: 32.28 KG/M2 | OXYGEN SATURATION: 99 %

## 2023-10-18 DIAGNOSIS — I10 ESSENTIAL (PRIMARY) HYPERTENSION: ICD-10-CM

## 2023-10-18 DIAGNOSIS — Z86.010 HISTORY OF COLON POLYPS: ICD-10-CM

## 2023-10-18 DIAGNOSIS — C50.912 MALIGNANT NEOPLASM OF LEFT BREAST IN FEMALE, ESTROGEN RECEPTOR POSITIVE, UNSPECIFIED SITE OF BREAST (HCC): Primary | ICD-10-CM

## 2023-10-18 DIAGNOSIS — Z90.13 HISTORY OF BILATERAL MASTECTOMY: ICD-10-CM

## 2023-10-18 DIAGNOSIS — M35.1 MCTD (MIXED CONNECTIVE TISSUE DISEASE) (HCC): ICD-10-CM

## 2023-10-18 DIAGNOSIS — G47.30 SLEEP APNEA, UNSPECIFIED TYPE: ICD-10-CM

## 2023-10-18 DIAGNOSIS — Z17.0 MALIGNANT NEOPLASM OF LEFT BREAST IN FEMALE, ESTROGEN RECEPTOR POSITIVE, UNSPECIFIED SITE OF BREAST (HCC): Primary | ICD-10-CM

## 2023-10-18 DIAGNOSIS — Z79.811 USE OF ANASTROZOLE (ARIMIDEX): ICD-10-CM

## 2023-10-18 DIAGNOSIS — M19.90 ARTHRITIS: ICD-10-CM

## 2023-10-18 DIAGNOSIS — Z86.018 HISTORY OF UTERINE FIBROID: ICD-10-CM

## 2023-10-18 DIAGNOSIS — M32.9 LUPUS (HCC): ICD-10-CM

## 2023-10-18 DIAGNOSIS — N89.8 VAGINAL DRYNESS: ICD-10-CM

## 2023-10-18 PROBLEM — Z86.0100 HISTORY OF COLON POLYPS: Status: ACTIVE | Noted: 2023-10-18

## 2023-10-18 PROCEDURE — G8427 DOCREV CUR MEDS BY ELIG CLIN: HCPCS | Performed by: INTERNAL MEDICINE

## 2023-10-18 PROCEDURE — 1123F ACP DISCUSS/DSCN MKR DOCD: CPT | Performed by: INTERNAL MEDICINE

## 2023-10-18 PROCEDURE — 3077F SYST BP >= 140 MM HG: CPT | Performed by: INTERNAL MEDICINE

## 2023-10-18 PROCEDURE — 99213 OFFICE O/P EST LOW 20 MIN: CPT | Performed by: INTERNAL MEDICINE

## 2023-10-18 PROCEDURE — 3017F COLORECTAL CA SCREEN DOC REV: CPT | Performed by: INTERNAL MEDICINE

## 2023-10-18 PROCEDURE — G8417 CALC BMI ABV UP PARAM F/U: HCPCS | Performed by: INTERNAL MEDICINE

## 2023-10-18 PROCEDURE — G8484 FLU IMMUNIZE NO ADMIN: HCPCS | Performed by: INTERNAL MEDICINE

## 2023-10-18 PROCEDURE — 1090F PRES/ABSN URINE INCON ASSESS: CPT | Performed by: INTERNAL MEDICINE

## 2023-10-18 PROCEDURE — 3078F DIAST BP <80 MM HG: CPT | Performed by: INTERNAL MEDICINE

## 2023-10-18 PROCEDURE — 1036F TOBACCO NON-USER: CPT | Performed by: INTERNAL MEDICINE

## 2023-10-18 PROCEDURE — G8400 PT W/DXA NO RESULTS DOC: HCPCS | Performed by: INTERNAL MEDICINE

## 2023-10-18 NOTE — PROGRESS NOTES
Tai Ramirez is a 70 y.o. female    Chief Complaint   Patient presents with    Follow-up     breast cancer       1. Have you been to the ER, urgent care clinic since your last visit? Hospitalized since your last visit? No    2. Have you seen or consulted any other health care providers outside of the [address] [place] [address] since your last visit? Include any pap smears or colon screening.  Dr. Radu Singer

## 2023-11-29 LAB — HBA1C MFR BLD HPLC: 5.8 %

## 2024-01-16 NOTE — PROGRESS NOTES
[address]  Breast Navigator Encounter    Name:    Nolberto Polo  Age:    57 y.o. Diagnosis:    LEFT breast cancer    Attempted to reach out to patient prior to her appointment next week with Dr. Armida Pollock for her newly diagnosed LEFT breast cancer. She was not available, so I left a message asking her to call me back at her convenience. I also mentioned on the message that I had tentatively gotten her set up for a breast MRI on 11/10 at [facility].                                    Teagan Guevara RN, BSN, [facility]  Oncology Breast Navigator     [address]  [address] [place] [address].  W: [phone]  F: [phone]  [email or web address]  Good Help to Those in [facility]

## 2024-01-23 ENCOUNTER — OFFICE VISIT (OUTPATIENT)
Age: 64
End: 2024-01-23
Payer: MEDICARE

## 2024-01-23 VITALS — WEIGHT: 182 LBS | HEIGHT: 65 IN | BODY MASS INDEX: 30.32 KG/M2

## 2024-01-23 DIAGNOSIS — Z90.13 STATUS POST BILATERAL MASTECTOMY: ICD-10-CM

## 2024-01-23 DIAGNOSIS — Z17.0 MALIGNANT NEOPLASM OF LEFT BREAST IN FEMALE, ESTROGEN RECEPTOR POSITIVE, UNSPECIFIED SITE OF BREAST (HCC): Primary | ICD-10-CM

## 2024-01-23 DIAGNOSIS — Z85.3 HISTORY OF BREAST CANCER IN FEMALE: ICD-10-CM

## 2024-01-23 DIAGNOSIS — C50.912 MALIGNANT NEOPLASM OF LEFT BREAST IN FEMALE, ESTROGEN RECEPTOR POSITIVE, UNSPECIFIED SITE OF BREAST (HCC): Primary | ICD-10-CM

## 2024-01-23 PROCEDURE — G8417 CALC BMI ABV UP PARAM F/U: HCPCS | Performed by: NURSE PRACTITIONER

## 2024-01-23 PROCEDURE — 1090F PRES/ABSN URINE INCON ASSESS: CPT | Performed by: NURSE PRACTITIONER

## 2024-01-23 PROCEDURE — 3017F COLORECTAL CA SCREEN DOC REV: CPT | Performed by: NURSE PRACTITIONER

## 2024-01-23 PROCEDURE — G8400 PT W/DXA NO RESULTS DOC: HCPCS | Performed by: NURSE PRACTITIONER

## 2024-01-23 PROCEDURE — 99213 OFFICE O/P EST LOW 20 MIN: CPT | Performed by: NURSE PRACTITIONER

## 2024-01-23 PROCEDURE — G8484 FLU IMMUNIZE NO ADMIN: HCPCS | Performed by: NURSE PRACTITIONER

## 2024-01-23 PROCEDURE — 1036F TOBACCO NON-USER: CPT | Performed by: NURSE PRACTITIONER

## 2024-01-23 PROCEDURE — G8427 DOCREV CUR MEDS BY ELIG CLIN: HCPCS | Performed by: NURSE PRACTITIONER

## 2024-01-23 PROCEDURE — 1123F ACP DISCUSS/DSCN MKR DOCD: CPT | Performed by: NURSE PRACTITIONER

## 2024-01-23 NOTE — PROGRESS NOTES
HISTORY OF PRESENT ILLNESS  Tanya Caraballo is a 63 y.o. female     HPI  Established patient presents for follow-up for LEFT breast cancer.  S/P BILATERAL mastectomy.   No concerns today.        Breast history -   Referring - Dr. Barger  10/31/2022: LEFT breast mass, ultrasound-guided biopsy. PATH: IDC with tubular features, 0.4cm in greatest dimension in a single core, grade 1, ER+(100%), [place]+(100%), HER2-,  Ki-67(5%).  2022: BILATERAL mastectomies and LEFT SNLbx - Dr. Langford  - LEFT breast simple mastectomy: IDC, 3 separate foci measuring 35, 8 and 7 mm in greatest dimension, Grade 1, negative margins, 1/5 lymph nodes with metastatic carcinoma. Pathologic stage: pT2, pN1a. Focal atypical ductal hyperplasia.  Intraductal papilloma.   - RIGHT breast simple mastectomy: Intraductal papilloma.   Usual ductal hyperplasia. Fibroadenomatoid change.   2022: Mammaprint: LOW RISK, Luminal type A, +0.538.  2023 - started AI - Dr. Harp         Family history -   No family history of breast or ovarian cancer      OB History          0    Para        Term   0            AB        Living             SAB        IAB        Ectopic        Molar        Multiple        Live Births              Obstetric Comments   Menarche:  9.   LMP: 32.  # of Children:  0.  Age at Delivery of First Child:  n/a.   Hysterectomy/oophorectomy:  YES/NO.  Breast Bx:  Yes left .  Hx of Breast Feeding:  n/a.  BCP:  no. Hormone therapy:  no.                 Past Surgical History:   Procedure Laterality Date    BREAST BIOPSY Left 10/31/2022    CHOLECYSTECTOMY, LAPAROSCOPIC  2014    CYST INCISION AND DRAINAGE Left 2016    shoulder    HERNIA REPAIR  10/07/2014    incisional with mesh    HYSTERECTOMY, TOTAL ABDOMINAL (CERVIX REMOVED)  1992    fibroids; Dr. Myers    LUMBAR FUSION      10/8/2012    LUMBAR LAMINECTOMY  2017    LUMBAR LAMINECTOMY      rods in , out     MALIGNANT SKIN LESION EXCISION Right

## 2024-02-09 ENCOUNTER — HOSPITAL ENCOUNTER (OUTPATIENT)
Facility: HOSPITAL | Age: 64
End: 2024-02-09
Payer: MEDICARE

## 2024-02-09 DIAGNOSIS — M19.012 OSTEOARTHRITIS OF LEFT GLENOHUMERAL JOINT: ICD-10-CM

## 2024-02-09 PROCEDURE — 73200 CT UPPER EXTREMITY W/O DYE: CPT

## 2024-02-15 ENCOUNTER — OFFICE VISIT (OUTPATIENT)
Age: 64
End: 2024-02-15
Payer: MEDICARE

## 2024-02-15 VITALS
HEART RATE: 87 BPM | HEIGHT: 65 IN | BODY MASS INDEX: 31.86 KG/M2 | TEMPERATURE: 98.3 F | DIASTOLIC BLOOD PRESSURE: 64 MMHG | OXYGEN SATURATION: 98 % | SYSTOLIC BLOOD PRESSURE: 120 MMHG | WEIGHT: 191.2 LBS

## 2024-02-15 DIAGNOSIS — R74.8 ABNORMAL LEVELS OF OTHER SERUM ENZYMES: Primary | ICD-10-CM

## 2024-02-15 DIAGNOSIS — Z72.89 OTHER PROBLEMS RELATED TO LIFESTYLE: ICD-10-CM

## 2024-02-15 DIAGNOSIS — Z11.59 ENCOUNTER FOR SCREENING FOR OTHER VIRAL DISEASES: ICD-10-CM

## 2024-02-15 LAB
INR PPP: 1 (ref 0.9–1.1)
PROTHROMBIN TIME: 10.2 SEC (ref 9–11.1)

## 2024-02-15 PROCEDURE — 91200 LIVER ELASTOGRAPHY: CPT | Performed by: PHYSICIAN ASSISTANT

## 2024-02-15 PROCEDURE — 99204 OFFICE O/P NEW MOD 45 MIN: CPT | Performed by: PHYSICIAN ASSISTANT

## 2024-02-15 RX ORDER — FLUTICASONE PROPIONATE AND SALMETEROL 250; 50 UG/1; UG/1
1 POWDER RESPIRATORY (INHALATION) 2 TIMES DAILY
COMMUNITY
Start: 2017-02-26

## 2024-02-15 NOTE — PROGRESS NOTES
[facility]      Adis Moreno MD, FACP, FACG, FAASLD      PJ George-SAÚL Moss, [facility]   Es Ratpablo, [facility]-   Fannyfritz Fulton, [facility]-  Александр Chapman, [facility]   Danelle Marshall, [facility]   Carmella Aston, [facility]   [address], [address]   [place]  [address] [phone]   FAX: [phone]  [facility]   [address], [address]   [place]  [address] [phone]   FAX: [phone]     Patient Care Team:  Magdalena Delatorre MD as PCP - General  [facility], Comfort MCCLURE MD as Physician  Lorraine Heller MD (Internal Med Rheumatology)    Patient Active Problem List   Diagnosis    Hyperlipemia    Hypertriglyceridemia    Lupus (HCC)    DJD (degenerative joint disease)    GERD (gastroesophageal reflux disease)    Trigger thumb, left thumb    Tobacco abuse    Degenerative arthritis of metacarpophalangeal joint of left thumb    Cervical stenosis of spine    Neuropathic pain    Asthma    Hyponatremia    Hypertension    Acute posthemorrhagic anemia    ETOH abuse    DDD (degenerative disc disease), lumbar    De Quervain's tenosynovitis, left    Hx of seizure disorder    Routine Papanicolaou smear    Murmur, cardiac    Infiltrating ductal carcinoma of left breast (HCC)    Breast cancer (HCC)    Malignant neoplasm of left breast in female, estrogen receptor positive (HCC)    History of bilateral mastectomy    Use of anastrozole (Arimidex)    MCTD (mixed connective tissue disease) (HCC)    Arthritis    Essential (primary) hypertension    Sleep apnea    History of uterine fibroid    History of colon polyps    Vaginal dryness     The clinicians listed above have asked me to see Tanya Caraballo in consultation regarding elevated liver enzymes and its management.  All medical records sent by the

## 2024-02-15 NOTE — PROGRESS NOTES
Identified pt with two pt identifiers(name and ). Reviewed record in preparation for visit and have obtained necessary documentation.    Chief Complaint   Patient presents with    New Patient     Establish care     /64 (Site: Right Upper Arm, Position: Sitting, Cuff Size: Large Adult)   Pulse 87   Temp 98.3 °F (36.8 °C)   Ht 1.651 m (5' 5\")   Wt 86.7 kg (191 lb 3.2 oz)   SpO2 98%   BMI 31.82 kg/m²       1. \"Have you been to the ER, urgent care clinic since your last visit?  Hospitalized since your last visit?\" No    2. \"Have you seen or consulted any other health care providers outside of the [facility] since your last visit?\" No     Patient is accompanied by self I have received verbal consent from Tanya Caraballo to discuss any/all medical information while they are present in the room.

## 2024-02-16 LAB
ALBUMIN SERPL-MCNC: 4.3 G/DL (ref 3.5–5)
ALBUMIN/GLOB SERPL: 1.7 (ref 1.1–2.2)
ALP SERPL-CCNC: 93 U/L (ref 45–117)
ALT SERPL-CCNC: 44 U/L (ref 12–78)
ANION GAP SERPL CALC-SCNC: 6 MMOL/L (ref 5–15)
AST SERPL-CCNC: 46 U/L (ref 15–37)
BILIRUB DIRECT SERPL-MCNC: <0.1 MG/DL (ref 0–0.2)
BILIRUB SERPL-MCNC: 0.4 MG/DL (ref 0.2–1)
BUN SERPL-MCNC: 14 MG/DL (ref 6–20)
BUN/CREAT SERPL: 16 (ref 12–20)
CALCIUM SERPL-MCNC: 9.6 MG/DL (ref 8.5–10.1)
CHLORIDE SERPL-SCNC: 102 MMOL/L (ref 97–108)
CO2 SERPL-SCNC: 27 MMOL/L (ref 21–32)
CREAT SERPL-MCNC: 0.87 MG/DL (ref 0.55–1.02)
ERYTHROCYTE [DISTWIDTH] IN BLOOD BY AUTOMATED COUNT: 13.1 % (ref 11.5–14.5)
FERRITIN SERPL-MCNC: 74 NG/ML (ref 26–388)
GLOBULIN SER CALC-MCNC: 2.6 G/DL (ref 2–4)
GLUCOSE SERPL-MCNC: 107 MG/DL (ref 65–100)
HBV SURFACE AB SER QL: NONREACTIVE
HBV SURFACE AB SER-ACNC: <3.1 MIU/ML
HBV SURFACE AG SER QL: <0.1 INDEX
HBV SURFACE AG SER QL: NEGATIVE
HCT VFR BLD AUTO: 37.9 % (ref 35–47)
HCV AB SER IA-ACNC: 0.09 INDEX
HCV AB SERPL QL IA: NONREACTIVE
HGB BLD-MCNC: 12.6 G/DL (ref 11.5–16)
IRON SATN MFR SERPL: 17 % (ref 20–50)
IRON SERPL-MCNC: 75 UG/DL (ref 35–150)
MCH RBC QN AUTO: 31.9 PG (ref 26–34)
MCHC RBC AUTO-ENTMCNC: 33.2 G/DL (ref 30–36.5)
MCV RBC AUTO: 95.9 FL (ref 80–99)
NRBC # BLD: 0 K/UL (ref 0–0.01)
NRBC BLD-RTO: 0 PER 100 WBC
PLATELET # BLD AUTO: 303 K/UL (ref 150–400)
PMV BLD AUTO: 11 FL (ref 8.9–12.9)
POTASSIUM SERPL-SCNC: 4.4 MMOL/L (ref 3.5–5.1)
PROT SERPL-MCNC: 6.9 G/DL (ref 6.4–8.2)
RBC # BLD AUTO: 3.95 M/UL (ref 3.8–5.2)
SODIUM SERPL-SCNC: 135 MMOL/L (ref 136–145)
TIBC SERPL-MCNC: 453 UG/DL (ref 250–450)
WBC # BLD AUTO: 6.2 K/UL (ref 3.6–11)

## 2024-02-17 LAB
HAV AB SER QL IA: NEGATIVE
HBV CORE AB SERPL QL IA: NEGATIVE

## 2024-02-18 LAB
A1AT SERPL-MCNC: 162 MG/DL (ref 101–187)
CERULOPLASMIN SERPL-MCNC: 26.1 MG/DL (ref 19–39)
MITOCHONDRIA M2 IGG SER-ACNC: <20 UNITS (ref 0–20)
SMA IGG SER-ACNC: 3 UNITS (ref 0–19)

## 2024-02-20 LAB
A2 MACROGLOB SERPL-MCNC: 148 MG/DL (ref 110–276)
ALT SERPL-CCNC: 41 IU/L (ref 0–40)
APO A-I SERPL-MCNC: 158 MG/DL (ref 116–209)
AST SERPL W P-5'-P-CCNC: 47 IU/L (ref 0–40)
BILIRUB SERPL-MCNC: 0.2 MG/DL (ref 0–1.2)
CHOLEST SERPL-MCNC: 185 MG/DL (ref 100–199)
FIBROSIS SCORING:: ABNORMAL
FIBROSIS STAGE SERPL QL: ABNORMAL
GGT SERPL-CCNC: 11 IU/L (ref 0–60)
GLUCOSE SERPL-MCNC: 98 MG/DL (ref 70–99)
HAPTOGLOB SERPL-MCNC: 110 MG/DL (ref 37–355)
INTERPRETATION: ABNORMAL
LABORATORY COMMENT REPORT: ABNORMAL
LIVER FIBR SCORE SERPL CALC.FIBROSURE: 0.05 (ref 0–0.21)
Lab: ABNORMAL
NASH - STEATOSIS GRADE: ABNORMAL
NASH - STEATOSIS GRADING: ABNORMAL
NASH - STEATOSIS SCORE: 0.53 (ref 0–0.4)
NASH SCORING: ABNORMAL
NECROINFLAMMATORY ACT GRADE SERPL QL: ABNORMAL
NECROINFLAMMATORY ACT SCORE SERPL: 0.61 (ref 0–0.25)
SERVICE CMNT-IMP: ABNORMAL
TRIGL SERPL-MCNC: 178 MG/DL (ref 0–149)

## 2024-02-22 ENCOUNTER — HOSPITAL ENCOUNTER (OUTPATIENT)
Facility: HOSPITAL | Age: 64
Discharge: HOME | End: 2024-02-22
Payer: MEDICARE

## 2024-02-22 DIAGNOSIS — R74.8 ABNORMAL LEVELS OF OTHER SERUM ENZYMES: ICD-10-CM

## 2024-02-22 PROCEDURE — 76700 US EXAM ABDOM COMPLETE: CPT

## 2024-04-24 ENCOUNTER — OFFICE VISIT (OUTPATIENT)
Age: 64
End: 2024-04-24
Payer: MEDICARE

## 2024-04-24 VITALS
RESPIRATION RATE: 18 BRPM | OXYGEN SATURATION: 96 % | TEMPERATURE: 98.2 F | DIASTOLIC BLOOD PRESSURE: 71 MMHG | WEIGHT: 194 LBS | HEART RATE: 74 BPM | BODY MASS INDEX: 32.28 KG/M2 | SYSTOLIC BLOOD PRESSURE: 131 MMHG

## 2024-04-24 DIAGNOSIS — Z90.13 HISTORY OF BILATERAL MASTECTOMY: ICD-10-CM

## 2024-04-24 DIAGNOSIS — C50.912 MALIGNANT NEOPLASM OF LEFT BREAST IN FEMALE, ESTROGEN RECEPTOR POSITIVE, UNSPECIFIED SITE OF BREAST (HCC): Primary | ICD-10-CM

## 2024-04-24 DIAGNOSIS — M32.9 LUPUS (HCC): ICD-10-CM

## 2024-04-24 DIAGNOSIS — Z86.018 HISTORY OF UTERINE FIBROID: ICD-10-CM

## 2024-04-24 DIAGNOSIS — Z86.010 HISTORY OF COLON POLYPS: ICD-10-CM

## 2024-04-24 DIAGNOSIS — M19.90 ARTHRITIS: ICD-10-CM

## 2024-04-24 DIAGNOSIS — G47.30 SLEEP APNEA, UNSPECIFIED TYPE: ICD-10-CM

## 2024-04-24 DIAGNOSIS — N89.8 VAGINAL DRYNESS: ICD-10-CM

## 2024-04-24 DIAGNOSIS — Z79.811 USE OF ANASTROZOLE (ARIMIDEX): ICD-10-CM

## 2024-04-24 DIAGNOSIS — Z17.0 MALIGNANT NEOPLASM OF LEFT BREAST IN FEMALE, ESTROGEN RECEPTOR POSITIVE, UNSPECIFIED SITE OF BREAST (HCC): Primary | ICD-10-CM

## 2024-04-24 DIAGNOSIS — I10 ESSENTIAL (PRIMARY) HYPERTENSION: ICD-10-CM

## 2024-04-24 DIAGNOSIS — M35.1 MCTD (MIXED CONNECTIVE TISSUE DISEASE) (HCC): ICD-10-CM

## 2024-04-24 PROCEDURE — 99213 OFFICE O/P EST LOW 20 MIN: CPT | Performed by: NURSE PRACTITIONER

## 2024-04-24 PROCEDURE — G8427 DOCREV CUR MEDS BY ELIG CLIN: HCPCS | Performed by: NURSE PRACTITIONER

## 2024-04-24 PROCEDURE — 3017F COLORECTAL CA SCREEN DOC REV: CPT | Performed by: NURSE PRACTITIONER

## 2024-04-24 PROCEDURE — 1123F ACP DISCUSS/DSCN MKR DOCD: CPT | Performed by: NURSE PRACTITIONER

## 2024-04-24 PROCEDURE — 3078F DIAST BP <80 MM HG: CPT | Performed by: NURSE PRACTITIONER

## 2024-04-24 PROCEDURE — G8417 CALC BMI ABV UP PARAM F/U: HCPCS | Performed by: NURSE PRACTITIONER

## 2024-04-24 PROCEDURE — 1036F TOBACCO NON-USER: CPT | Performed by: NURSE PRACTITIONER

## 2024-04-24 PROCEDURE — 3075F SYST BP GE 130 - 139MM HG: CPT | Performed by: NURSE PRACTITIONER

## 2024-04-24 PROCEDURE — 1090F PRES/ABSN URINE INCON ASSESS: CPT | Performed by: NURSE PRACTITIONER

## 2024-04-24 PROCEDURE — G8400 PT W/DXA NO RESULTS DOC: HCPCS | Performed by: NURSE PRACTITIONER

## 2024-04-24 NOTE — PROGRESS NOTES
Tanya Caraballo is a 63 y.o. female    Chief Complaint   Patient presents with    Follow-up     Left Breast Cancer       1. Have you been to the ER, urgent care clinic since your last visit?  Hospitalized since your last visit?No    2. Have you seen or consulted any other health care providers outside of the [facility] since your last visit?  Include any pap smears or colon screening. Yes, [facility]      
(ie, OTCs not working) then can use sparingly  She is currently using OTC which helps.      5) Psychosocial  Mood good, coping well.   She has good family support.   SW/NN support as needed.     Call if questions.  Follow up in 6 months, seeing Breast Surgery in 3 months.   I appreciate the opportunity to participate in Ms. Tanya Caraballo's care.    Signed By: ESTEVAN Torres - NP

## 2024-06-01 ENCOUNTER — HOSPITAL ENCOUNTER (OUTPATIENT)
Facility: HOSPITAL | Age: 64
End: 2024-06-01
Payer: MEDICARE

## 2024-06-01 DIAGNOSIS — M19.041 ARTHRITIS OF RIGHT HAND: ICD-10-CM

## 2024-06-01 PROCEDURE — 73200 CT UPPER EXTREMITY W/O DYE: CPT

## 2024-06-22 DIAGNOSIS — C50.912 MALIGNANT NEOPLASM OF LEFT BREAST IN FEMALE, ESTROGEN RECEPTOR POSITIVE, UNSPECIFIED SITE OF BREAST (HCC): ICD-10-CM

## 2024-06-22 DIAGNOSIS — Z17.0 MALIGNANT NEOPLASM OF LEFT BREAST IN FEMALE, ESTROGEN RECEPTOR POSITIVE, UNSPECIFIED SITE OF BREAST (HCC): ICD-10-CM

## 2024-06-24 RX ORDER — ANASTROZOLE 1 MG/1
1 TABLET ORAL DAILY
Qty: 90 TABLET | Refills: 3 | Status: SHIPPED | OUTPATIENT
Start: 2024-06-24

## 2024-06-24 NOTE — TELEPHONE ENCOUNTER
Oral Hormone therapy     Tanya Caraballo is a  63 y.o.female  diagnosed with breast cancer . Ms. Caraballo is being treated with Anastrozole.     Medication name: Anastrozole    Dose:  1 mg   Frequency: daily    Ordering provider: Tanya Rubio DO  Start date: 1/2023        Last OV 4/4/24  Next OV 10/22/24    Refill for Anastrozole sent to pharmacy on file       Sherlyn Jamil, JORDEND, BCOP, BCPS    For Pharmacy Admin Tracking Only    Program: Medical Group  CPA in place:  Yes  Recommendation Provided To: Patient/Caregiver: 1 via Telephone  Intervention Detail: Refill(s) Provided  Intervention Accepted By: Patient/Caregiver: 1    Time Spent (min): 10

## 2024-07-23 ENCOUNTER — OFFICE VISIT (OUTPATIENT)
Age: 64
End: 2024-07-23
Payer: MEDICARE

## 2024-07-23 VITALS — HEIGHT: 65 IN | WEIGHT: 194 LBS | BODY MASS INDEX: 32.32 KG/M2

## 2024-07-23 DIAGNOSIS — Z90.13 STATUS POST BILATERAL MASTECTOMY: ICD-10-CM

## 2024-07-23 DIAGNOSIS — Z85.3 HISTORY OF BREAST CANCER IN FEMALE: ICD-10-CM

## 2024-07-23 DIAGNOSIS — C50.912 MALIGNANT NEOPLASM OF LEFT BREAST IN FEMALE, ESTROGEN RECEPTOR POSITIVE, UNSPECIFIED SITE OF BREAST (HCC): Primary | ICD-10-CM

## 2024-07-23 DIAGNOSIS — Z17.0 MALIGNANT NEOPLASM OF LEFT BREAST IN FEMALE, ESTROGEN RECEPTOR POSITIVE, UNSPECIFIED SITE OF BREAST (HCC): Primary | ICD-10-CM

## 2024-07-23 DIAGNOSIS — I89.0 LYMPHEDEMA: ICD-10-CM

## 2024-07-23 PROCEDURE — 3017F COLORECTAL CA SCREEN DOC REV: CPT | Performed by: NURSE PRACTITIONER

## 2024-07-23 PROCEDURE — G8427 DOCREV CUR MEDS BY ELIG CLIN: HCPCS | Performed by: NURSE PRACTITIONER

## 2024-07-23 PROCEDURE — G8400 PT W/DXA NO RESULTS DOC: HCPCS | Performed by: NURSE PRACTITIONER

## 2024-07-23 PROCEDURE — 1090F PRES/ABSN URINE INCON ASSESS: CPT | Performed by: NURSE PRACTITIONER

## 2024-07-23 PROCEDURE — 99213 OFFICE O/P EST LOW 20 MIN: CPT | Performed by: NURSE PRACTITIONER

## 2024-07-23 PROCEDURE — 1123F ACP DISCUSS/DSCN MKR DOCD: CPT | Performed by: NURSE PRACTITIONER

## 2024-07-23 PROCEDURE — G8417 CALC BMI ABV UP PARAM F/U: HCPCS | Performed by: NURSE PRACTITIONER

## 2024-07-23 PROCEDURE — 1036F TOBACCO NON-USER: CPT | Performed by: NURSE PRACTITIONER

## 2024-07-23 NOTE — PROGRESS NOTES
HISTORY OF PRESENT ILLNESS  Tanya Caraballo is a 63 y.o. female     HPI  Established patient presents for follow-up for LEFT breast cancer.  S/P BILATERAL mastectomy.   No concerns today.        Breast history -   Referring - Dr. Barger  10/31/2022: LEFT breast mass, ultrasound-guided biopsy. PATH: IDC with tubular features, 0.4cm in greatest dimension in a single core, grade 1, ER+(100%), KS+(100%), HER2-,  Ki-67(5%).  2022: BILATERAL mastectomies and LEFT SNLbx - Dr. Langford  - LEFT breast simple mastectomy: IDC, 3 separate foci measuring 35, 8 and 7 mm in greatest dimension, Grade 1, negative margins, 1/5 lymph nodes with metastatic carcinoma. Pathologic stage: pT2, pN1a. Focal atypical ductal hyperplasia.  Intraductal papilloma.   - RIGHT breast simple mastectomy: Intraductal papilloma.   Usual ductal hyperplasia. Fibroadenomatoid change.   2022: Mammaprint: LOW RISK, Luminal type A, +0.538.  2023 - started AI - Dr. Harp         Family history -   No family history of breast or ovarian cancer        OB History          0    Para        Term   0            AB        Living             SAB        IAB        Ectopic        Molar        Multiple        Live Births              Obstetric Comments   Menarche:  9.   LMP: 32.  # of Children:  0.  Age at Delivery of First Child:  n/a.   Hysterectomy/oophorectomy:  YES/NO.  Breast Bx:  Yes left .  Hx of Breast Feeding:  n/a.  BCP:  no. Hormone therapy:  no.                   Past Surgical History:   Procedure Laterality Date    BREAST BIOPSY Left 10/31/2022    CHOLECYSTECTOMY, LAPAROSCOPIC  2014    CYST INCISION AND DRAINAGE Left 2016    shoulder    HERNIA REPAIR  10/07/2014    incisional with mesh    HYSTERECTOMY, TOTAL ABDOMINAL (CERVIX REMOVED)  1992    fibroids; Dr. Myers    JOINT REPLACEMENT  3/24/2023    KNEE ARTHROPLASTY  8/15/2023    LUMBAR FUSION      10/8/2012    LUMBAR LAMINECTOMY  2017    LUMBAR LAMINECTOMY

## 2024-08-20 ENCOUNTER — OFFICE VISIT (OUTPATIENT)
Age: 64
End: 2024-08-20
Payer: MEDICARE

## 2024-08-20 VITALS
HEART RATE: 68 BPM | WEIGHT: 187.2 LBS | OXYGEN SATURATION: 98 % | BODY MASS INDEX: 31.19 KG/M2 | DIASTOLIC BLOOD PRESSURE: 64 MMHG | SYSTOLIC BLOOD PRESSURE: 108 MMHG | HEIGHT: 65 IN | RESPIRATION RATE: 16 BRPM | TEMPERATURE: 98 F

## 2024-08-20 DIAGNOSIS — R74.8 ABNORMAL LEVELS OF OTHER SERUM ENZYMES: Primary | ICD-10-CM

## 2024-08-20 PROCEDURE — 91200 LIVER ELASTOGRAPHY: CPT | Performed by: PHYSICIAN ASSISTANT

## 2024-08-20 PROCEDURE — 99214 OFFICE O/P EST MOD 30 MIN: CPT | Performed by: PHYSICIAN ASSISTANT

## 2024-08-20 NOTE — PROGRESS NOTES
[facility]      Adis Moreno MD, FACP, FACG, FAASLD      PJ George-SAÚL Moss, [facility]   Es Carmela, [facility]-   Fanny Fulton, [facility]-  Александр Chapman, [facility]-   Danelle Marshall, [facility]   Carmella Bower, [facility]   [address], [address]   [place]  [address] [phone]   FAX: [phone]  Critical access hospital   [address]   [place]  [address] [phone]   FAX: [phone]     Patient Care Team:  Magdalena Delatorre MD as PCP - General  [facility], Comfort MCCLURE MD as Physician  Lorraine Heller MD (Internal Med Rheumatology)    Patient Active Problem List   Diagnosis    Hyperlipemia    Hypertriglyceridemia    Lupus (HCC)    DJD (degenerative joint disease)    GERD (gastroesophageal reflux disease)    Trigger thumb, left thumb    Tobacco abuse    Degenerative arthritis of metacarpophalangeal joint of left thumb    Cervical stenosis of spine    Neuropathic pain    Asthma    Hyponatremia    Hypertension    Acute posthemorrhagic anemia    ETOH abuse    DDD (degenerative disc disease), lumbar    De Quervain's tenosynovitis, left    Hx of seizure disorder    Routine Papanicolaou smear    Murmur, cardiac    Infiltrating ductal carcinoma of left breast (HCC)    Breast cancer (HCC)    Malignant neoplasm of left breast in female, estrogen receptor positive (HCC)    History of bilateral mastectomy    Use of anastrozole (Arimidex)    MCTD (mixed connective tissue disease) (HCC)    Arthritis    Essential (primary) hypertension    Sleep apnea    History of uterine fibroid    History of colon polyps    Vaginal dryness     Tanya MEGHNA Caraballo is being seen at [facility] for management of elevated liver enzymes thought to be due to Steatotic Liver Disease (SLD) formerly called

## 2024-08-20 NOTE — PROGRESS NOTES
Chief Complaint   Patient presents with    6 Month Follow-Up     Fibroscan         /64 (Site: Left Upper Arm)   Pulse 68   Temp 98 °F (36.7 °C)   Resp 16   Ht 1.651 m (5' 5\")   Wt 84.9 kg (187 lb 3.2 oz)   SpO2 98%   BMI 31.15 kg/m²      1. Have you been to the ER, urgent care clinic since your last visit?  Hospitalized since your last visit? No     2. Have you seen or consulted any other health care providers outside of the [facility] since your last visit?  Include any pap smears or colon screening. No     Health Maintenance Due   Topic Date Due    DTaP/Tdap/Td vaccine (1 - Tdap) Never done    Colorectal Cancer Screen  Never done    Lung Cancer Screening &/or Counseling  Never done    DEXA (modify frequency per FRAX score)  Never done    Respiratory Syncytial Virus (RSV) Pregnant or age 60 yrs+ (1 - 1-dose 60+ series) Never done    Shingles vaccine (2 of 3) 09/18/2018    COVID-19 Vaccine (6 - 2023-24 season) 09/01/2023    Annual Wellness Visit (Medicare Advantage)  Never done    Flu vaccine (1) 08/01/2024             No data to display                 Failed to redirect to the Timeline version of the [employer] SmartLink.    Failed to redirect to the Timeline version of the [employer] SmartLink.

## 2024-10-08 NOTE — PROGRESS NOTES
Added Plan: If not resolved pt will call the office Detail Level: Detailed Initiate Treatment: Hydrocortisone cream bid x 2 weeks

## 2024-10-22 ENCOUNTER — OFFICE VISIT (OUTPATIENT)
Age: 64
End: 2024-10-22
Payer: MEDICARE

## 2024-10-22 VITALS
SYSTOLIC BLOOD PRESSURE: 126 MMHG | OXYGEN SATURATION: 98 % | WEIGHT: 183 LBS | RESPIRATION RATE: 18 BRPM | BODY MASS INDEX: 30.45 KG/M2 | HEART RATE: 81 BPM | DIASTOLIC BLOOD PRESSURE: 67 MMHG | TEMPERATURE: 98.1 F

## 2024-10-22 DIAGNOSIS — Z79.811 USE OF ANASTROZOLE (ARIMIDEX): ICD-10-CM

## 2024-10-22 DIAGNOSIS — M35.1 MCTD (MIXED CONNECTIVE TISSUE DISEASE) (HCC): ICD-10-CM

## 2024-10-22 DIAGNOSIS — M19.90 ARTHRITIS: ICD-10-CM

## 2024-10-22 DIAGNOSIS — Z90.13 HISTORY OF BILATERAL MASTECTOMY: ICD-10-CM

## 2024-10-22 DIAGNOSIS — C50.912 MALIGNANT NEOPLASM OF LEFT BREAST IN FEMALE, ESTROGEN RECEPTOR POSITIVE, UNSPECIFIED SITE OF BREAST (HCC): Primary | ICD-10-CM

## 2024-10-22 DIAGNOSIS — Z17.0 MALIGNANT NEOPLASM OF LEFT BREAST IN FEMALE, ESTROGEN RECEPTOR POSITIVE, UNSPECIFIED SITE OF BREAST (HCC): Primary | ICD-10-CM

## 2024-10-22 PROCEDURE — G8427 DOCREV CUR MEDS BY ELIG CLIN: HCPCS | Performed by: INTERNAL MEDICINE

## 2024-10-22 PROCEDURE — G8484 FLU IMMUNIZE NO ADMIN: HCPCS | Performed by: INTERNAL MEDICINE

## 2024-10-22 PROCEDURE — 99213 OFFICE O/P EST LOW 20 MIN: CPT | Performed by: INTERNAL MEDICINE

## 2024-10-22 PROCEDURE — 1123F ACP DISCUSS/DSCN MKR DOCD: CPT | Performed by: INTERNAL MEDICINE

## 2024-10-22 PROCEDURE — 3074F SYST BP LT 130 MM HG: CPT | Performed by: INTERNAL MEDICINE

## 2024-10-22 PROCEDURE — G8400 PT W/DXA NO RESULTS DOC: HCPCS | Performed by: INTERNAL MEDICINE

## 2024-10-22 PROCEDURE — 3017F COLORECTAL CA SCREEN DOC REV: CPT | Performed by: INTERNAL MEDICINE

## 2024-10-22 PROCEDURE — G8417 CALC BMI ABV UP PARAM F/U: HCPCS | Performed by: INTERNAL MEDICINE

## 2024-10-22 PROCEDURE — 1036F TOBACCO NON-USER: CPT | Performed by: INTERNAL MEDICINE

## 2024-10-22 PROCEDURE — 3078F DIAST BP <80 MM HG: CPT | Performed by: INTERNAL MEDICINE

## 2024-10-22 PROCEDURE — 1090F PRES/ABSN URINE INCON ASSESS: CPT | Performed by: INTERNAL MEDICINE

## 2024-10-22 NOTE — PROGRESS NOTES
Tanya Caraballo is a 64 y.o. female    Chief Complaint   Patient presents with    Follow-up      Left Breast Cancer       1. Have you been to the ER, urgent care clinic since your last visit?  Hospitalized since your last visit?No    2. Have you seen or consulted any other health care providers outside of the [facility] since your last visit?  Include any pap smears or colon screening. Yes, [facility]      
UT) TABS Take 1 tablet by mouth daily    cyanocobalamin 1000 MCG tablet Take 1 tablet by mouth daily    cycloSPORINE (RESTASIS) 0.05 % ophthalmic emulsion Apply 1 drop to eye 2 times daily    dexlansoprazole (DEXILANT) 60 MG CPDR delayed release capsule Take 1 capsule by mouth every morning    famotidine (PEPCID) 40 MG tablet Take 1 tablet by mouth every evening    ferrous sulfate (IRON 325) 325 (65 Fe) MG tablet Take 1 tablet by mouth every morning (before breakfast)    folic acid (FOLVITE) 1 MG tablet Take 1 tablet by mouth 2 times daily    gemfibrozil (LOPID) 600 MG tablet Take 1 tablet by mouth 2 times daily (before meals)    hydroxychloroquine (PLAQUENIL) 200 MG tablet Take 1 tablet by mouth 2 times daily    methotrexate (RHEUMATREX) 2.5 MG chemo tablet Take 4 tablets by mouth 6 times daily 6 TABLETS ONCE PER WEEK    pregabalin (LYRICA) 225 MG capsule Take 1 capsule by mouth 2 times daily.    pyridoxine (B-6) 100 MG tablet Take 1 tablet by mouth daily    Sertraline HCl 200 MG CAPS Take 200 mg by mouth every evening    valsartan (DIOVAN) 320 MG tablet Take 1 tablet by mouth every morning     No current facility-administered medications for this visit.      Allergies   Allergen Reactions    Codeine Shortness Of Breath    Penicillins Rash and Shortness Of Breath     9/14/16 Reports able to take Keflex without problem.  Tolerated ancef in 12/12    Tramadol Shortness Of Breath     Other reaction(s): SOB, HEADACHE    Carbapenems      Other reaction(s): RASH, THROAT CLOSES    Cephalosporins      Other reaction(s): RASH, THROAT CLOSES    Loracarbef      Other reaction(s): Rash, THROAT CLOSES    Nsaids Other (See Comments)     Gastric bleeding    Adhesive Tape Rash      Review of Systems:  A complete review of systems was obtained, negative except as described above and as reported on ROS sheet scanned into system.     Physical Exam:   /67 (Site: Right Upper Arm, Position: Sitting)   Pulse 81   Temp 98.1 °F

## 2025-01-02 ENCOUNTER — TELEPHONE (OUTPATIENT)
Age: 65
End: 2025-01-02

## 2025-01-02 NOTE — TELEPHONE ENCOUNTER
[id number]  Call to #[phone], [facility], requested cb, supplied LPN contact info. Sent [facility] with update.

## 2025-01-28 ENCOUNTER — OFFICE VISIT (OUTPATIENT)
Age: 65
End: 2025-01-28
Payer: MEDICARE

## 2025-01-28 VITALS — BODY MASS INDEX: 30.49 KG/M2 | WEIGHT: 183 LBS | HEIGHT: 65 IN

## 2025-01-28 DIAGNOSIS — Z17.0 MALIGNANT NEOPLASM OF LEFT BREAST IN FEMALE, ESTROGEN RECEPTOR POSITIVE, UNSPECIFIED SITE OF BREAST (HCC): Primary | ICD-10-CM

## 2025-01-28 DIAGNOSIS — C50.912 MALIGNANT NEOPLASM OF LEFT BREAST IN FEMALE, ESTROGEN RECEPTOR POSITIVE, UNSPECIFIED SITE OF BREAST (HCC): Primary | ICD-10-CM

## 2025-01-28 DIAGNOSIS — Z85.3 HISTORY OF BREAST CANCER IN FEMALE: ICD-10-CM

## 2025-01-28 DIAGNOSIS — Z90.13 STATUS POST BILATERAL MASTECTOMY: ICD-10-CM

## 2025-01-28 PROCEDURE — 3017F COLORECTAL CA SCREEN DOC REV: CPT | Performed by: NURSE PRACTITIONER

## 2025-01-28 PROCEDURE — G8417 CALC BMI ABV UP PARAM F/U: HCPCS | Performed by: NURSE PRACTITIONER

## 2025-01-28 PROCEDURE — G8400 PT W/DXA NO RESULTS DOC: HCPCS | Performed by: NURSE PRACTITIONER

## 2025-01-28 PROCEDURE — 1123F ACP DISCUSS/DSCN MKR DOCD: CPT | Performed by: NURSE PRACTITIONER

## 2025-01-28 PROCEDURE — 1159F MED LIST DOCD IN RCRD: CPT | Performed by: NURSE PRACTITIONER

## 2025-01-28 PROCEDURE — 99213 OFFICE O/P EST LOW 20 MIN: CPT | Performed by: NURSE PRACTITIONER

## 2025-01-28 PROCEDURE — G8427 DOCREV CUR MEDS BY ELIG CLIN: HCPCS | Performed by: NURSE PRACTITIONER

## 2025-01-28 PROCEDURE — 1160F RVW MEDS BY RX/DR IN RCRD: CPT | Performed by: NURSE PRACTITIONER

## 2025-01-28 PROCEDURE — 1090F PRES/ABSN URINE INCON ASSESS: CPT | Performed by: NURSE PRACTITIONER

## 2025-01-28 PROCEDURE — 1036F TOBACCO NON-USER: CPT | Performed by: NURSE PRACTITIONER

## 2025-01-28 NOTE — PROGRESS NOTES
HISTORY OF PRESENT ILLNESS  Tanya Caraballo is a 64 y.o. female     HPI  Established patient presents for follow-up for LEFT breast cancer.  S/P BILATERAL mastectomy.   No concerns today.        Breast history -   Referring - Dr. Barger  10/31/2022: LEFT breast mass, ultrasound-guided biopsy. PATH: IDC with tubular features, 0.4cm in greatest dimension in a single core, grade 1, ER+(100%), [place]+(100%), HER2-,  Ki-67(5%).  2022: BILATERAL mastectomies and LEFT SNLbx - Dr. Langford  - LEFT breast simple mastectomy: IDC, 3 separate foci measuring 35, 8 and 7 mm in greatest dimension, Grade 1, negative margins, 1/5 lymph nodes with metastatic carcinoma. Pathologic stage: pT2, pN1a. Focal atypical ductal hyperplasia.  Intraductal papilloma.   - RIGHT breast simple mastectomy: Intraductal papilloma.   Usual ductal hyperplasia. Fibroadenomatoid change.   2022: Mammaprint: LOW RISK, Luminal type A, +0.538.  2023 - started AI - Dr. Harp         Family history -   No family history of breast or ovarian cancer      OB History          0    Para        Term   0            AB        Living             SAB        IAB        Ectopic        Molar        Multiple        Live Births              Obstetric Comments   Menarche:  9.   LMP: 32.  # of Children:  0.  Age at Delivery of First Child:  n/a.   Hysterectomy/oophorectomy:  YES/NO.  Breast Bx:  Yes left .  Hx of Breast Feeding:  n/a.  BCP:  no. Hormone therapy:  no.                   Past Surgical History:   Procedure Laterality Date    BREAST BIOPSY Left 10/31/2022    CHOLECYSTECTOMY, LAPAROSCOPIC  2014    CYST INCISION AND DRAINAGE Left 2016    shoulder    HERNIA REPAIR  10/07/2014    incisional with mesh    HYSTERECTOMY, TOTAL ABDOMINAL (CERVIX REMOVED)  1992    fibroids; Dr. Myers    JOINT REPLACEMENT  3/24/2023    KNEE ARTHROPLASTY  8/15/2023    LUMBAR FUSION      10/8/2012    LUMBAR LAMINECTOMY  2017    LUMBAR LAMINECTOMY

## 2025-03-03 ENCOUNTER — OFFICE VISIT (OUTPATIENT)
Age: 65
End: 2025-03-03
Payer: MEDICARE

## 2025-03-03 VITALS
BODY MASS INDEX: 30.79 KG/M2 | RESPIRATION RATE: 18 BRPM | SYSTOLIC BLOOD PRESSURE: 124 MMHG | OXYGEN SATURATION: 97 % | WEIGHT: 185 LBS | HEART RATE: 63 BPM | TEMPERATURE: 98.2 F | DIASTOLIC BLOOD PRESSURE: 70 MMHG

## 2025-03-03 DIAGNOSIS — Z17.0 MALIGNANT NEOPLASM OF LEFT BREAST IN FEMALE, ESTROGEN RECEPTOR POSITIVE, UNSPECIFIED SITE OF BREAST (HCC): Primary | ICD-10-CM

## 2025-03-03 DIAGNOSIS — M19.90 ARTHRITIS: ICD-10-CM

## 2025-03-03 DIAGNOSIS — C50.912 MALIGNANT NEOPLASM OF LEFT BREAST IN FEMALE, ESTROGEN RECEPTOR POSITIVE, UNSPECIFIED SITE OF BREAST (HCC): Primary | ICD-10-CM

## 2025-03-03 DIAGNOSIS — M35.1 MCTD (MIXED CONNECTIVE TISSUE DISEASE): ICD-10-CM

## 2025-03-03 DIAGNOSIS — I10 ESSENTIAL (PRIMARY) HYPERTENSION: ICD-10-CM

## 2025-03-03 DIAGNOSIS — Z79.811 USE OF ANASTROZOLE (ARIMIDEX): ICD-10-CM

## 2025-03-03 DIAGNOSIS — Z90.13 HISTORY OF BILATERAL MASTECTOMY: ICD-10-CM

## 2025-03-03 PROCEDURE — 3078F DIAST BP <80 MM HG: CPT | Performed by: INTERNAL MEDICINE

## 2025-03-03 PROCEDURE — 1036F TOBACCO NON-USER: CPT | Performed by: INTERNAL MEDICINE

## 2025-03-03 PROCEDURE — G8417 CALC BMI ABV UP PARAM F/U: HCPCS | Performed by: INTERNAL MEDICINE

## 2025-03-03 PROCEDURE — 3074F SYST BP LT 130 MM HG: CPT | Performed by: INTERNAL MEDICINE

## 2025-03-03 PROCEDURE — 1123F ACP DISCUSS/DSCN MKR DOCD: CPT | Performed by: INTERNAL MEDICINE

## 2025-03-03 PROCEDURE — 1125F AMNT PAIN NOTED PAIN PRSNT: CPT | Performed by: INTERNAL MEDICINE

## 2025-03-03 PROCEDURE — 3017F COLORECTAL CA SCREEN DOC REV: CPT | Performed by: INTERNAL MEDICINE

## 2025-03-03 PROCEDURE — G8427 DOCREV CUR MEDS BY ELIG CLIN: HCPCS | Performed by: INTERNAL MEDICINE

## 2025-03-03 PROCEDURE — 1160F RVW MEDS BY RX/DR IN RCRD: CPT | Performed by: INTERNAL MEDICINE

## 2025-03-03 PROCEDURE — 1159F MED LIST DOCD IN RCRD: CPT | Performed by: INTERNAL MEDICINE

## 2025-03-03 PROCEDURE — 99215 OFFICE O/P EST HI 40 MIN: CPT | Performed by: INTERNAL MEDICINE

## 2025-03-03 PROCEDURE — 1090F PRES/ABSN URINE INCON ASSESS: CPT | Performed by: INTERNAL MEDICINE

## 2025-03-03 PROCEDURE — G8400 PT W/DXA NO RESULTS DOC: HCPCS | Performed by: INTERNAL MEDICINE

## 2025-03-03 NOTE — PROGRESS NOTES
Tanya Caraballo is a 64 y.o. female    Chief Complaint   Patient presents with    Follow-up     Left Breast Cancer       1. Have you been to the ER, urgent care clinic since your last visit?  Hospitalized since your last visit? Yes, [facility] 12/24    2. Have you seen or consulted any other health care providers outside of the [facility] since your last visit?  Include any pap smears or colon screening. Yes, Dr. Heller/[facility]    
(2000 UT) TABS Take 1 tablet by mouth daily    cyanocobalamin 1000 MCG tablet Take 1 tablet by mouth daily    cycloSPORINE (RESTASIS) 0.05 % ophthalmic emulsion Apply 1 drop to eye 2 times daily    dexlansoprazole (DEXILANT) 60 MG CPDR delayed release capsule Take 1 capsule by mouth every morning    famotidine (PEPCID) 40 MG tablet Take 1 tablet by mouth every evening    ferrous sulfate (IRON 325) 325 (65 Fe) MG tablet Take 1 tablet by mouth every morning (before breakfast)    folic acid (FOLVITE) 1 MG tablet Take 1 tablet by mouth 2 times daily    gemfibrozil (LOPID) 600 MG tablet Take 1 tablet by mouth 2 times daily (before meals)    hydroxychloroquine (PLAQUENIL) 200 MG tablet Take 1 tablet by mouth 2 times daily    methotrexate (RHEUMATREX) 2.5 MG chemo tablet Take 4 tablets by mouth 6 times daily 6 TABLETS ONCE PER WEEK    pregabalin (LYRICA) 225 MG capsule Take 1 capsule by mouth 2 times daily.    pyridoxine (B-6) 100 MG tablet Take 1 tablet by mouth daily    Sertraline HCl 200 MG CAPS Take 200 mg by mouth every evening    valsartan (DIOVAN) 320 MG tablet Take 1 tablet by mouth every morning     No current facility-administered medications for this visit.      Allergies   Allergen Reactions    Codeine Shortness Of Breath    Penicillins Rash and Shortness Of Breath     9/14/16 Reports able to take Keflex without problem.  Tolerated ancef in 12/12    Tramadol Shortness Of Breath     Other reaction(s): SOB, HEADACHE    Carbapenems      Other reaction(s): RASH, THROAT CLOSES    Cephalosporins      Other reaction(s): RASH, THROAT CLOSES    Loracarbef      Other reaction(s): Rash, THROAT CLOSES    Nsaids Other (See Comments)     Gastric bleeding    Adhesive Tape Rash      Review of Systems:  A complete review of systems was obtained, negative except as described above and as reported on ROS sheet scanned into system.     Physical Exam:   /70 (Site: Left Upper Arm, Position: Sitting)   Pulse 63   Temp 98.2 °F

## 2025-05-28 DIAGNOSIS — Z17.0 MALIGNANT NEOPLASM OF LEFT BREAST IN FEMALE, ESTROGEN RECEPTOR POSITIVE, UNSPECIFIED SITE OF BREAST (HCC): ICD-10-CM

## 2025-05-28 DIAGNOSIS — C50.912 MALIGNANT NEOPLASM OF LEFT BREAST IN FEMALE, ESTROGEN RECEPTOR POSITIVE, UNSPECIFIED SITE OF BREAST (HCC): ICD-10-CM

## 2025-05-28 RX ORDER — ANASTROZOLE 1 MG/1
1 TABLET ORAL DAILY
Qty: 90 TABLET | Refills: 3 | Status: SHIPPED | OUTPATIENT
Start: 2025-05-28

## 2025-08-21 ENCOUNTER — OFFICE VISIT (OUTPATIENT)
Age: 65
End: 2025-08-21
Payer: MEDICARE

## 2025-08-21 VITALS
BODY MASS INDEX: 30.66 KG/M2 | DIASTOLIC BLOOD PRESSURE: 87 MMHG | HEIGHT: 65 IN | TEMPERATURE: 97.5 F | HEART RATE: 81 BPM | SYSTOLIC BLOOD PRESSURE: 144 MMHG | WEIGHT: 184 LBS | OXYGEN SATURATION: 98 %

## 2025-08-21 DIAGNOSIS — R74.8 ABNORMAL LEVELS OF OTHER SERUM ENZYMES: Primary | ICD-10-CM

## 2025-08-21 PROCEDURE — 99214 OFFICE O/P EST MOD 30 MIN: CPT | Performed by: PHYSICIAN ASSISTANT

## 2025-08-21 PROCEDURE — 91200 LIVER ELASTOGRAPHY: CPT | Performed by: PHYSICIAN ASSISTANT

## 2025-08-21 ASSESSMENT — PATIENT HEALTH QUESTIONNAIRE - PHQ9
SUM OF ALL RESPONSES TO PHQ QUESTIONS 1-9: 0
1. LITTLE INTEREST OR PLEASURE IN DOING THINGS: NOT AT ALL
DEPRESSION UNABLE TO ASSESS: FUNCTIONAL CAPACITY MOTIVATION LIMITS ACCURACY
2. FEELING DOWN, DEPRESSED OR HOPELESS: NOT AT ALL
SUM OF ALL RESPONSES TO PHQ QUESTIONS 1-9: 0

## 2025-09-03 ENCOUNTER — OFFICE VISIT (OUTPATIENT)
Age: 65
End: 2025-09-03
Payer: MEDICARE

## 2025-09-03 VITALS
WEIGHT: 184 LBS | SYSTOLIC BLOOD PRESSURE: 112 MMHG | DIASTOLIC BLOOD PRESSURE: 63 MMHG | TEMPERATURE: 98 F | OXYGEN SATURATION: 97 % | HEART RATE: 82 BPM | BODY MASS INDEX: 30.62 KG/M2 | RESPIRATION RATE: 18 BRPM

## 2025-09-03 DIAGNOSIS — Z90.13 HISTORY OF BILATERAL MASTECTOMY: ICD-10-CM

## 2025-09-03 DIAGNOSIS — Z17.0 MALIGNANT NEOPLASM OF LEFT BREAST IN FEMALE, ESTROGEN RECEPTOR POSITIVE, UNSPECIFIED SITE OF BREAST (HCC): Primary | ICD-10-CM

## 2025-09-03 DIAGNOSIS — Z86.018 HISTORY OF UTERINE FIBROID: ICD-10-CM

## 2025-09-03 DIAGNOSIS — Z79.811 USE OF ANASTROZOLE (ARIMIDEX): ICD-10-CM

## 2025-09-03 DIAGNOSIS — M06.9 RHEUMATOID ARTHRITIS, INVOLVING UNSPECIFIED SITE, UNSPECIFIED WHETHER RHEUMATOID FACTOR PRESENT (HCC): ICD-10-CM

## 2025-09-03 DIAGNOSIS — Z96.641 HISTORY OF RIGHT HIP REPLACEMENT: ICD-10-CM

## 2025-09-03 DIAGNOSIS — M35.1 MCTD (MIXED CONNECTIVE TISSUE DISEASE): ICD-10-CM

## 2025-09-03 DIAGNOSIS — I10 ESSENTIAL (PRIMARY) HYPERTENSION: ICD-10-CM

## 2025-09-03 DIAGNOSIS — N89.8 VAGINAL DRYNESS: ICD-10-CM

## 2025-09-03 DIAGNOSIS — M19.90 ARTHRITIS: ICD-10-CM

## 2025-09-03 DIAGNOSIS — Z86.0100 HISTORY OF COLON POLYPS: ICD-10-CM

## 2025-09-03 DIAGNOSIS — C50.912 MALIGNANT NEOPLASM OF LEFT BREAST IN FEMALE, ESTROGEN RECEPTOR POSITIVE, UNSPECIFIED SITE OF BREAST (HCC): Primary | ICD-10-CM

## 2025-09-03 DIAGNOSIS — G47.30 SLEEP APNEA, UNSPECIFIED TYPE: ICD-10-CM

## 2025-09-03 DIAGNOSIS — M32.9 SYSTEMIC LUPUS ERYTHEMATOSUS, UNSPECIFIED SLE TYPE, UNSPECIFIED ORGAN INVOLVEMENT STATUS (HCC): ICD-10-CM

## 2025-09-03 PROCEDURE — 99213 OFFICE O/P EST LOW 20 MIN: CPT | Performed by: NURSE PRACTITIONER

## 2025-09-03 RX ORDER — VONOPRAZAN FUMARATE 26.72 MG/1
20 TABLET ORAL DAILY
COMMUNITY
Start: 2025-08-09

## (undated) DEVICE — SUTURE VCRL SZ 3-0 L27IN ABSRB UD FS-2 L19MM 1/2 CIR J423H

## (undated) DEVICE — 12MM CONCAVE MTP REAMER: Brand: ACUMED

## (undated) DEVICE — GLOVE ORANGE PI 7 1/2   MSG9075

## (undated) DEVICE — COVER LT HNDL PLAS RIG 1 PER PK

## (undated) DEVICE — ZIMMER® STERILE DISPOSABLE TOURNIQUET CUFF WITH PROTECTIVE SLEEVE AND PLC, DUAL PORT, SINGLE BLADDER, 18 IN. (46 CM)

## (undated) DEVICE — DRAPE,REIN 53X77,STERILE: Brand: MEDLINE

## (undated) DEVICE — INFECTION CONTROL KIT SYS

## (undated) DEVICE — COVER US PRB W12XL244CM FLD IORT STR TIP

## (undated) DEVICE — HAND I-LF: Brand: MEDLINE INDUSTRIES, INC.

## (undated) DEVICE — PLATE TACK: Brand: ACUMED

## (undated) DEVICE — SUTURE ETHLN SZ 4-0 L18IN NONABSORBABLE BLK L19MM PS-2 3/8 1667H

## (undated) DEVICE — SUTURE MCRYL SZ 4-0 L27IN ABSRB UD L19MM PS-2 1/2 CIR PRIM Y426H

## (undated) DEVICE — BANDAGE COBAN 4 IN COMPR W4INXL5YD FOAM COHESIVE QUIK STK SELF ADH SFT

## (undated) DEVICE — 1010 S-DRAPE TOWEL DRAPE 10/BX: Brand: STERI-DRAPE™

## (undated) DEVICE — GLOVE SURG SZ 8 L12IN FNGR THK79MIL GRN LTX FREE

## (undated) DEVICE — STERILE POLYISOPRENE POWDER-FREE SURGICAL GLOVES: Brand: PROTEXIS

## (undated) DEVICE — Device

## (undated) DEVICE — BANDAGE,GAUZE,CONFORMING,2"X75",STRL,LF: Brand: MEDLINE INDUSTRIES, INC.

## (undated) DEVICE — SUT ETHLN 3-0 18IN PS2 BLK --

## (undated) DEVICE — SOLUTION IRRIG 1000ML 0.9% SOD CHL USP POUR PLAS BTL

## (undated) DEVICE — WRAP COHESIVE W2INXL5YD TAN SELF ADH BNDG HND NON STERILE TEAR CARING

## (undated) DEVICE — BIPOLAR FORCEPS CORD: Brand: VALLEYLAB

## (undated) DEVICE — CANNULATED DRILL: Brand: FIXOS

## (undated) DEVICE — GLOVE ORTHO 7 1/2   MSG9475

## (undated) DEVICE — HYPODERMIC SAFETY NEEDLE: Brand: MAGELLAN

## (undated) DEVICE — ADHESIVE SKIN CLSR 0.7ML TOP DERMBND ADV

## (undated) DEVICE — SUTURE VCRL SZ 3-0 L27IN ABSRB UD L26MM SH 1/2 CIR J416H

## (undated) DEVICE — STERILE POLYISOPRENE POWDER-FREE SURGICAL GLOVES WITH EMOLLIENT COATING: Brand: PROTEXIS

## (undated) DEVICE — UNTHREADED GUIDE WIRE: Brand: FIXOS

## (undated) DEVICE — GOWN,SIRUS,NONRNF,SETINSLV,2XL,18/CS: Brand: MEDLINE

## (undated) DEVICE — GLOVE ORTHO 8   MSG9480

## (undated) DEVICE — CHEST PACK-SFMCASU: Brand: MEDLINE INDUSTRIES, INC.

## (undated) DEVICE — PACK,BASIC,SIRUS,V: Brand: MEDLINE

## (undated) DEVICE — SOL IRRIGATION INJ NACL 0.9% 500ML BTL

## (undated) DEVICE — CANNULATED COUNTERSINK: Brand: FIXOS

## (undated) DEVICE — SKIN PREP TRAY W/CHG: Brand: MEDLINE INDUSTRIES, INC.

## (undated) DEVICE — EXTREMITY-SFMCASU: Brand: MEDLINE INDUSTRIES, INC.

## (undated) DEVICE — ACUTRAK 2® DRILL, LONG: Brand: ACUMED

## (undated) DEVICE — INSULATED BLADE ELECTRODE: Brand: EDGE

## (undated) DEVICE — INTENDED FOR TISSUE SEPARATION, AND OTHER PROCEDURES THAT REQUIRE A SHARP SURGICAL BLADE TO PUNCTURE OR CUT.: Brand: BARD-PARKER ® CARBON RIB-BACK BLADES

## (undated) DEVICE — CURVED, SMALL JAW, OPEN SEALER/DIVIDER: Brand: LIGASURE

## (undated) DEVICE — 4.0MM EGG RESURFACING TOOL

## (undated) DEVICE — 14MM CONCAVE MTP REAMER: Brand: ACUMED

## (undated) DEVICE — SUT MCRYL 4-0 27IN PS2 UD --

## (undated) DEVICE — BLADE OPHTH 180DEG CUT SURF BLU STR SHRP DBL BVL GRINDLESS

## (undated) DEVICE — DRAPE,U/ SHT,SPLIT,PLAS,STERIL: Brand: MEDLINE

## (undated) DEVICE — STRAP,POSITIONING,KNEE/BODY,FOAM,4X60": Brand: MEDLINE

## (undated) DEVICE — TUBING, SUCTION, 1/4" X 10', STRAIGHT: Brand: MEDLINE

## (undated) DEVICE — BANDAGE COMPR W6INXL10YD ST M E WHITE/BEIGE

## (undated) DEVICE — SUTURE VCRL SZ 0 L27IN ABSRB UD SH L26MM 1/2 CIR TAPERPOINT J418H

## (undated) DEVICE — AEGIS 1" DISK 4MM HOLE, PEEL OPEN: Brand: MEDLINE

## (undated) DEVICE — 2.0MM X 5" QUICK RELEASE DRILL: Brand: ACUMED

## (undated) DEVICE — 5302, SHIP ASSY., BONE GROWTH STIMULATOR, PHYSIOSTIM, US: Brand: PEMF - PHYSIO-STIM

## (undated) DEVICE — SPONGE LAP 18X18IN STRL -- 5/PK

## (undated) DEVICE — NEEDLE HYPO 18GA L1.5IN PNK S STL HUB POLYPR SHLD REG BVL

## (undated) DEVICE — ROCKER SWITCH PENCIL BLADE ELECTRODE, HOLSTER: Brand: EDGE

## (undated) DEVICE — CANISTER, RIGID, 3000CC: Brand: MEDLINE INDUSTRIES, INC.

## (undated) DEVICE — SUTURE MCRYL SZ 4-0 L27IN ABSRB UD L24MM PS-1 3/8 CIR PRIM Y935H

## (undated) DEVICE — PENCIL SMK EVAC L10FT DIA95MM TBNG NONSTICK W ADPT TO 22MM

## (undated) DEVICE — RESERVOIR,SUCTION,100CC,SILICONE: Brand: MEDLINE

## (undated) DEVICE — TOWEL,OR,DSP,ST,BLUE,STD,2/PK,40PK/CS: Brand: MEDLINE

## (undated) DEVICE — STRIP,CLOSURE,WOUND,MEDI-STRIP,1/2X4: Brand: MEDLINE

## (undated) DEVICE — HOLDING PIN: Brand: ANCHORAGE

## (undated) DEVICE — SPLINT CAST W4XL15IN GRN STRENGTH PLSTR OF PARIS FAST SET

## (undated) DEVICE — 3M™ TEGADERM™ TRANSPARENT FILM DRESSING FRAME STYLE, 1626W, 4 IN X 4-3/4 IN (10 CM X 12 CM), 50/CT 4CT/CASE: Brand: 3M™ TEGADERM™

## (undated) DEVICE — DRESSING,GAUZE,XEROFORM,CURAD,1"X8",ST: Brand: CURAD

## (undated) DEVICE — DRAIN SURG 19FR 100% SIL RADPQ RND CHN FULL FLUT

## (undated) DEVICE — COVER US PRB W15XL120CM W/ GEL RUBBERBAND TAPE STRP FLD GEN